# Patient Record
Sex: FEMALE | Race: BLACK OR AFRICAN AMERICAN | NOT HISPANIC OR LATINO | Employment: OTHER | URBAN - NONMETROPOLITAN AREA
[De-identification: names, ages, dates, MRNs, and addresses within clinical notes are randomized per-mention and may not be internally consistent; named-entity substitution may affect disease eponyms.]

---

## 2018-01-08 ENCOUNTER — TRANSCRIBE ORDERS (OUTPATIENT)
Dept: ULTRASOUND IMAGING | Facility: HOSPITAL | Age: 68
End: 2018-01-08

## 2018-01-08 DIAGNOSIS — E04.1 THYROID NODULE: ICD-10-CM

## 2018-01-08 DIAGNOSIS — R79.89 ABNORMAL THYROID BLOOD TEST: Primary | ICD-10-CM

## 2018-01-11 ENCOUNTER — HOSPITAL ENCOUNTER (OUTPATIENT)
Dept: ULTRASOUND IMAGING | Facility: HOSPITAL | Age: 68
Discharge: HOME | End: 2018-01-11
Admitting: NURSE PRACTITIONER

## 2018-01-11 DIAGNOSIS — E04.1 THYROID NODULE: ICD-10-CM

## 2018-01-11 DIAGNOSIS — R79.89 ABNORMAL THYROID BLOOD TEST: ICD-10-CM

## 2018-01-11 PROCEDURE — 76536 US EXAM OF HEAD AND NECK: CPT

## 2018-01-23 ENCOUNTER — TRANSCRIBE ORDERS (OUTPATIENT)
Dept: ENDOCRINOLOGY | Facility: CLINIC | Age: 68
End: 2018-01-23

## 2018-01-23 DIAGNOSIS — E05.90 HYPERTHYROIDISM: Primary | ICD-10-CM

## 2018-04-18 ENCOUNTER — OFFICE VISIT (OUTPATIENT)
Dept: INTERNAL MEDICINE | Facility: CLINIC | Age: 68
End: 2018-04-18

## 2018-04-18 VITALS
OXYGEN SATURATION: 97 % | HEART RATE: 58 BPM | TEMPERATURE: 97 F | HEIGHT: 62 IN | WEIGHT: 127 LBS | DIASTOLIC BLOOD PRESSURE: 61 MMHG | BODY MASS INDEX: 23.37 KG/M2 | SYSTOLIC BLOOD PRESSURE: 124 MMHG

## 2018-04-18 DIAGNOSIS — M25.562 CHRONIC PAIN OF LEFT KNEE: ICD-10-CM

## 2018-04-18 DIAGNOSIS — G89.29 CHRONIC NECK PAIN: ICD-10-CM

## 2018-04-18 DIAGNOSIS — M25.662 STIFFNESS OF LEFT KNEE: ICD-10-CM

## 2018-04-18 DIAGNOSIS — J30.2 CHRONIC SEASONAL ALLERGIC RHINITIS DUE TO OTHER ALLERGEN: ICD-10-CM

## 2018-04-18 DIAGNOSIS — G89.29 CHRONIC PAIN OF LEFT KNEE: ICD-10-CM

## 2018-04-18 DIAGNOSIS — Z12.31 SCREENING MAMMOGRAM, ENCOUNTER FOR: ICD-10-CM

## 2018-04-18 DIAGNOSIS — R79.89 LOW VITAMIN D LEVEL: ICD-10-CM

## 2018-04-18 DIAGNOSIS — M54.2 CHRONIC NECK PAIN: ICD-10-CM

## 2018-04-18 DIAGNOSIS — I10 ESSENTIAL HYPERTENSION: Primary | ICD-10-CM

## 2018-04-18 PROCEDURE — 99204 OFFICE O/P NEW MOD 45 MIN: CPT | Performed by: INTERNAL MEDICINE

## 2018-04-18 RX ORDER — HYDROCODONE BITARTRATE AND ACETAMINOPHEN 10; 325 MG/1; MG/1
1 TABLET ORAL EVERY 6 HOURS PRN
COMMUNITY
End: 2018-08-14

## 2018-04-18 RX ORDER — LISINOPRIL 20 MG/1
20 TABLET ORAL DAILY
Qty: 30 TABLET | Refills: 5 | Status: SHIPPED | OUTPATIENT
Start: 2018-04-18 | End: 2018-10-09 | Stop reason: SDUPTHER

## 2018-04-18 RX ORDER — ASPIRIN 81 MG/1
81 TABLET ORAL DAILY
COMMUNITY
End: 2018-04-18 | Stop reason: SDUPTHER

## 2018-04-18 RX ORDER — CETIRIZINE HYDROCHLORIDE 10 MG/1
10 TABLET ORAL DAILY
COMMUNITY
End: 2018-04-18 | Stop reason: SDUPTHER

## 2018-04-18 RX ORDER — LISINOPRIL 20 MG/1
20 TABLET ORAL DAILY
COMMUNITY
End: 2018-04-18 | Stop reason: SDUPTHER

## 2018-04-18 RX ORDER — CETIRIZINE HYDROCHLORIDE 10 MG/1
10 TABLET ORAL DAILY
Qty: 30 TABLET | Refills: 5 | Status: SHIPPED | OUTPATIENT
Start: 2018-04-18 | End: 2018-05-30

## 2018-04-18 RX ORDER — ASPIRIN 81 MG/1
81 TABLET ORAL DAILY
Qty: 30 TABLET | Refills: 5 | Status: SHIPPED | OUTPATIENT
Start: 2018-04-18

## 2018-04-18 NOTE — PATIENT INSTRUCTIONS
Health Maintenance, Female  Adopting a healthy lifestyle and getting preventive care can go a long way to promote health and wellness. Talk with your health care provider about what schedule of regular examinations is right for you. This is a good chance for you to check in with your provider about disease prevention and staying healthy.  In between checkups, there are plenty of things you can do on your own. Experts have done a lot of research about which lifestyle changes and preventive measures are most likely to keep you healthy. Ask your health care provider for more information.  Weight and diet  Eat a healthy diet  · Be sure to include plenty of vegetables, fruits, low-fat dairy products, and lean protein.  · Do not eat a lot of foods high in solid fats, added sugars, or salt.  · Get regular exercise. This is one of the most important things you can do for your health.  ¨ Most adults should exercise for at least 150 minutes each week. The exercise should increase your heart rate and make you sweat (moderate-intensity exercise).  ¨ Most adults should also do strengthening exercises at least twice a week. This is in addition to the moderate-intensity exercise.  Maintain a healthy weight  · Body mass index (BMI) is a measurement that can be used to identify possible weight problems. It estimates body fat based on height and weight. Your health care provider can help determine your BMI and help you achieve or maintain a healthy weight.  · For females 20 years of age and older:  ¨ A BMI below 18.5 is considered underweight.  ¨ A BMI of 18.5 to 24.9 is normal.  ¨ A BMI of 25 to 29.9 is considered overweight.  ¨ A BMI of 30 and above is considered obese.  Watch levels of cholesterol and blood lipids  · You should start having your blood tested for lipids and cholesterol at 20 years of age, then have this test every 5 years.  · You may need to have your cholesterol levels checked more often if:  ¨ Your lipid or  cholesterol levels are high.  ¨ You are older than 50 years of age.  ¨ You are at high risk for heart disease.  Cancer screening  Lung Cancer  · Lung cancer screening is recommended for adults 55-80 years old who are at high risk for lung cancer because of a history of smoking.  · A yearly low-dose CT scan of the lungs is recommended for people who:  ¨ Currently smoke.  ¨ Have quit within the past 15 years.  ¨ Have at least a 30-pack-year history of smoking. A pack year is smoking an average of one pack of cigarettes a day for 1 year.  · Yearly screening should continue until it has been 15 years since you quit.  · Yearly screening should stop if you develop a health problem that would prevent you from having lung cancer treatment.  Breast Cancer  · Practice breast self-awareness. This means understanding how your breasts normally appear and feel.  · It also means doing regular breast self-exams. Let your health care provider know about any changes, no matter how small.  · If you are in your 20s or 30s, you should have a clinical breast exam (CBE) by a health care provider every 1-3 years as part of a regular health exam.  · If you are 40 or older, have a CBE every year. Also consider having a breast X-ray (mammogram) every year.  · If you have a family history of breast cancer, talk to your health care provider about genetic screening.  · If you are at high risk for breast cancer, talk to your health care provider about having an MRI and a mammogram every year.  · Breast cancer gene (BRCA) assessment is recommended for women who have family members with BRCA-related cancers. BRCA-related cancers include:  ¨ Breast.  ¨ Ovarian.  ¨ Tubal.  ¨ Peritoneal cancers.  · Results of the assessment will determine the need for genetic counseling and BRCA1 and BRCA2 testing.  Cervical Cancer   Your health care provider may recommend that you be screened regularly for cancer of the pelvic organs (ovaries, uterus, and vagina).  This screening involves a pelvic examination, including checking for microscopic changes to the surface of your cervix (Pap test). You may be encouraged to have this screening done every 3 years, beginning at age 21.  · For women ages 30-65, health care providers may recommend pelvic exams and Pap testing every 3 years, or they may recommend the Pap and pelvic exam, combined with testing for human papilloma virus (HPV), every 5 years. Some types of HPV increase your risk of cervical cancer. Testing for HPV may also be done on women of any age with unclear Pap test results.  · Other health care providers may not recommend any screening for nonpregnant women who are considered low risk for pelvic cancer and who do not have symptoms. Ask your health care provider if a screening pelvic exam is right for you.  · If you have had past treatment for cervical cancer or a condition that could lead to cancer, you need Pap tests and screening for cancer for at least 20 years after your treatment. If Pap tests have been discontinued, your risk factors (such as having a new sexual partner) need to be reassessed to determine if screening should resume. Some women have medical problems that increase the chance of getting cervical cancer. In these cases, your health care provider may recommend more frequent screening and Pap tests.  Colorectal Cancer  · This type of cancer can be detected and often prevented.  · Routine colorectal cancer screening usually begins at 50 years of age and continues through 75 years of age.  · Your health care provider may recommend screening at an earlier age if you have risk factors for colon cancer.  · Your health care provider may also recommend using home test kits to check for hidden blood in the stool.  · A small camera at the end of a tube can be used to examine your colon directly (sigmoidoscopy or colonoscopy). This is done to check for the earliest forms of colorectal cancer.  · Routine  screening usually begins at age 50.  · Direct examination of the colon should be repeated every 5-10 years through 75 years of age. However, you may need to be screened more often if early forms of precancerous polyps or small growths are found.  Skin Cancer  · Check your skin from head to toe regularly.  · Tell your health care provider about any new moles or changes in moles, especially if there is a change in a mole's shape or color.  · Also tell your health care provider if you have a mole that is larger than the size of a pencil eraser.  · Always use sunscreen. Apply sunscreen liberally and repeatedly throughout the day.  · Protect yourself by wearing long sleeves, pants, a wide-brimmed hat, and sunglasses whenever you are outside.  Heart disease, diabetes, and high blood pressure  · High blood pressure causes heart disease and increases the risk of stroke. High blood pressure is more likely to develop in:  ¨ People who have blood pressure in the high end of the normal range (130-139/85-89 mm Hg).  ¨ People who are overweight or obese.  ¨ People who are .  · If you are 18-39 years of age, have your blood pressure checked every 3-5 years. If you are 40 years of age or older, have your blood pressure checked every year. You should have your blood pressure measured twice--once when you are at a hospital or clinic, and once when you are not at a hospital or clinic. Record the average of the two measurements. To check your blood pressure when you are not at a hospital or clinic, you can use:  ¨ An automated blood pressure machine at a pharmacy.  ¨ A home blood pressure monitor.  · If you are between 55 years and 79 years old, ask your health care provider if you should take aspirin to prevent strokes.  · Have regular diabetes screenings. This involves taking a blood sample to check your fasting blood sugar level.  ¨ If you are at a normal weight and have a low risk for diabetes, have this test once  every three years after 45 years of age.  ¨ If you are overweight and have a high risk for diabetes, consider being tested at a younger age or more often.  Preventing infection  Hepatitis B  · If you have a higher risk for hepatitis B, you should be screened for this virus. You are considered at high risk for hepatitis B if:  ¨ You were born in a country where hepatitis B is common. Ask your health care provider which countries are considered high risk.  ¨ Your parents were born in a high-risk country, and you have not been immunized against hepatitis B (hepatitis B vaccine).  ¨ You have HIV or AIDS.  ¨ You use needles to inject street drugs.  ¨ You live with someone who has hepatitis B.  ¨ You have had sex with someone who has hepatitis B.  ¨ You get hemodialysis treatment.  ¨ You take certain medicines for conditions, including cancer, organ transplantation, and autoimmune conditions.  Hepatitis C  · Blood testing is recommended for:  ¨ Everyone born from 1945 through 1965.  ¨ Anyone with known risk factors for hepatitis C.  Sexually transmitted infections (STIs)  · You should be screened for sexually transmitted infections (STIs) including gonorrhea and chlamydia if:  ¨ You are sexually active and are younger than 24 years of age.  ¨ You are older than 24 years of age and your health care provider tells you that you are at risk for this type of infection.  ¨ Your sexual activity has changed since you were last screened and you are at an increased risk for chlamydia or gonorrhea. Ask your health care provider if you are at risk.  · If you do not have HIV, but are at risk, it may be recommended that you take a prescription medicine daily to prevent HIV infection. This is called pre-exposure prophylaxis (PrEP). You are considered at risk if:  ¨ You are sexually active and do not regularly use condoms or know the HIV status of your partner(s).  ¨ You take drugs by injection.  ¨ You are sexually active with a partner  who has HIV.  Talk with your health care provider about whether you are at high risk of being infected with HIV. If you choose to begin PrEP, you should first be tested for HIV. You should then be tested every 3 months for as long as you are taking PrEP.  Pregnancy  · If you are premenopausal and you may become pregnant, ask your health care provider about preconception counseling.  · If you may become pregnant, take 400 to 800 micrograms (mcg) of folic acid every day.  · If you want to prevent pregnancy, talk to your health care provider about birth control (contraception).  Osteoporosis and menopause  · Osteoporosis is a disease in which the bones lose minerals and strength with aging. This can result in serious bone fractures. Your risk for osteoporosis can be identified using a bone density scan.  · If you are 65 years of age or older, or if you are at risk for osteoporosis and fractures, ask your health care provider if you should be screened.  · Ask your health care provider whether you should take a calcium or vitamin D supplement to lower your risk for osteoporosis.  · Menopause may have certain physical symptoms and risks.  · Hormone replacement therapy may reduce some of these symptoms and risks.  Talk to your health care provider about whether hormone replacement therapy is right for you.  Follow these instructions at home:  · Schedule regular health, dental, and eye exams.  · Stay current with your immunizations.  · Do not use any tobacco products including cigarettes, chewing tobacco, or electronic cigarettes.  · If you are pregnant, do not drink alcohol.  · If you are breastfeeding, limit how much and how often you drink alcohol.  · Limit alcohol intake to no more than 1 drink per day for nonpregnant women. One drink equals 12 ounces of beer, 5 ounces of wine, or 1½ ounces of hard liquor.  · Do not use street drugs.  · Do not share needles.  · Ask your health care provider for help if you need support  or information about quitting drugs.  · Tell your health care provider if you often feel depressed.  · Tell your health care provider if you have ever been abused or do not feel safe at home.  This information is not intended to replace advice given to you by your health care provider. Make sure you discuss any questions you have with your health care provider.  Document Released: 07/02/2012 Document Revised: 05/25/2017 Document Reviewed: 09/20/2016  [employer] Patient Education © 2017 [employer]

## 2018-04-18 NOTE — PROGRESS NOTES
Chief Complaint   Patient presents with   • [facility]     former patient of [facility]; patient had to change providers due to insurance change; patient needs referral to Dr Nation; patient has an appointment tomorrow       Subjective     History of Present Illness   Mya Hawkins is a 67 y.o. female presenting to [facility].  She requests a referral to Dr. Nation for chronic pain management.  Patient has chronic pain in her LLE.  She has had two TKA procedures on that knee and despite this she is unable to bend her knee due to scarring.  She is currently on disability for her knee.  She also had cervical spine fracture which has resulted in chronic pain which intermittently bothers her. Tens unit helps for her pains.     BP well controlled on lisinopril.      She does have allergies which are controlled with Zyrtec.     Preventive health was reviewed and updated.     The following portions of the patient's history were reviewed and updated as appropriate: allergies, current medications, past family history, past medical history, past social history, past surgical history and problem list.    Review of Systems   Constitutional: Negative for chills, fatigue and fever.   HENT: Negative for congestion, ear pain, rhinorrhea, sinus pressure and sore throat.    Eyes: Negative for visual disturbance.   Respiratory: Negative for cough, chest tightness, shortness of breath and wheezing.    Cardiovascular: Negative for chest pain, palpitations and leg swelling.   Gastrointestinal: Negative for abdominal pain, blood in stool, constipation, diarrhea, nausea and vomiting.   Endocrine: Negative for polydipsia and polyuria.   Genitourinary: Negative for dysuria and hematuria.   Musculoskeletal: Positive for arthralgias, back pain and neck pain.   Skin: Negative for rash.   Neurological: Negative for dizziness, light-headedness, numbness and headaches.   Psychiatric/Behavioral: Negative for dysphoric mood  "and sleep disturbance. The patient is not nervous/anxious.        No Known Allergies    Past Medical History:   Diagnosis Date   • Hay fever    • Hypertension        Social History     Social History   • Marital status:      Spouse name: N/A   • Number of children: N/A   • Years of education: N/A     Occupational History   • Not on file.     Social History Main Topics   • Smoking status: Current Every Day Smoker   • Smokeless tobacco: Not on file   • Alcohol use No   • Drug use: No   • Sexual activity: Not on file     Other Topics Concern   • Not on file     Social History Narrative   • No narrative on file        Past Surgical History:   Procedure Laterality Date   • CATARACT EXTRACTION     • HYSTERECTOMY     • KNEE SURGERY     • TONSILLECTOMY         Family History   Problem Relation Age of Onset   • Diabetes Mother    • Heart attack Mother    • Diabetes Father    • Heart attack Father    • Stroke Father    • Hyperlipidemia Father    • Hypertension Father    • Colon polyps Brother    • Cancer Paternal Aunt      stomach         Current Outpatient Prescriptions:   •  aspirin 81 MG EC tablet, Take 1 tablet by mouth Daily., Disp: 30 tablet, Rfl: 5  •  cetirizine (zyrTEC) 10 MG tablet, Take 1 tablet by mouth Daily., Disp: 30 tablet, Rfl: 5  •  HYDROcodone-acetaminophen (NORCO)  MG per tablet, Take 1 tablet by mouth Every 6 (Six) Hours As Needed for Moderate Pain ., Disp: , Rfl:   •  lisinopril (PRINIVIL,ZESTRIL) 20 MG tablet, Take 1 tablet by mouth Daily., Disp: 30 tablet, Rfl: 5    Objective   /61 (BP Location: Left arm, Patient Position: Sitting)   Pulse 58   Temp 97 °F (36.1 °C)   Ht 157.5 cm (62\")   Wt 57.6 kg (127 lb)   SpO2 97%   BMI 23.23 kg/m²     Physical Exam   Constitutional: She is oriented to person, place, and time. She appears well-nourished. No distress.   HENT:   Head: Atraumatic.   Right Ear: External ear normal.   Left Ear: External ear normal.   Eyes: Conjunctivae are " normal. Right eye exhibits no discharge. Left eye exhibits no discharge.   Neck: Normal range of motion. Neck supple.   Cardiovascular: Normal rate and regular rhythm.    No murmur heard.  Pulmonary/Chest: Effort normal and breath sounds normal. She has no wheezes. She has no rales.   Abdominal: Soft. Bowel sounds are normal. She exhibits no distension. There is no tenderness.   Neurological: She is alert and oriented to person, place, and time.   Skin: No rash noted. She is not diaphoretic.   Psychiatric: She has a normal mood and affect.   Nursing note and vitals reviewed.      Assessment/Plan   Mya was seen today for [facility].    Diagnoses and all orders for this visit:    Essential hypertension  -     lisinopril (PRINIVIL,ZESTRIL) 20 MG tablet; Take 1 tablet by mouth Daily.  -     aspirin 81 MG EC tablet; Take 1 tablet by mouth Daily.    Chronic pain of left knee  -     Ambulatory Referral to Pain Management    Stiffness of left knee  -     Ambulatory Referral to Pain Management    Screening mammogram, encounter for  -     Mammo screening digital tomosynthesis bilateral w CAD    Chronic neck pain  -     Ambulatory Referral to Pain Management    Low vitamin D level    Chronic seasonal allergic rhinitis due to other allergen  -     cetirizine (zyrTEC) 10 MG tablet; Take 1 tablet by mouth Daily.          Discussion Summary:    67 AA F presenting to [facility] care    1. 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 Pt to transfer from Dr. Zhou to Dr. Nation due to insurance requirements.    2. Preventive health / vaccines  -Records needed from [facility] for vaccines  - Due for Mammogram, ordered.      Follow up:  Return in about 6 weeks (around 5/30/2018) for Medicare Wellness.     Patient Instructions:  Patient instructions were provided.

## 2018-04-25 ENCOUNTER — HOSPITAL ENCOUNTER (OUTPATIENT)
Dept: MAMMOGRAPHY | Facility: HOSPITAL | Age: 68
Discharge: HOME | End: 2018-04-25
Attending: INTERNAL MEDICINE | Admitting: INTERNAL MEDICINE

## 2018-04-25 PROCEDURE — 77067 SCR MAMMO BI INCL CAD: CPT

## 2018-04-25 PROCEDURE — 77063 BREAST TOMOSYNTHESIS BI: CPT

## 2018-05-30 ENCOUNTER — OFFICE VISIT (OUTPATIENT)
Dept: INTERNAL MEDICINE | Facility: CLINIC | Age: 68
End: 2018-05-30

## 2018-05-30 VITALS
OXYGEN SATURATION: 98 % | WEIGHT: 125 LBS | BODY MASS INDEX: 23 KG/M2 | TEMPERATURE: 97.6 F | HEART RATE: 69 BPM | HEIGHT: 62 IN | DIASTOLIC BLOOD PRESSURE: 66 MMHG | SYSTOLIC BLOOD PRESSURE: 135 MMHG

## 2018-05-30 DIAGNOSIS — Z12.11 ENCOUNTER FOR SCREENING COLONOSCOPY: Primary | ICD-10-CM

## 2018-05-30 DIAGNOSIS — B35.6 TINEA CRURIS: ICD-10-CM

## 2018-05-30 PROCEDURE — 99213 OFFICE O/P EST LOW 20 MIN: CPT | Performed by: INTERNAL MEDICINE

## 2018-05-30 NOTE — PROGRESS NOTES
Chief Complaint   Patient presents with   • Hypertension     follow up; also c/o rash on back of neck x 1 week       Subjective     History of Present Illness   Mya Hawkins is a 68 y.o. female presenting for follow-up on chronic medical issues.  Patient shows that her chronic pain Main stable.  Blood pressure is well controlled with her current doses of lisinopril.  She does mention that she has a rash on her left upper neck which has been pruritic and bothering her over the last 1 week.  Health maintenance topics were also briefly reviewed today.    Vaccination record is at [facility].  [place] primary care curds are pending.    The following portions of the patient's history were reviewed and updated as appropriate: allergies, current medications, past family history, past medical history, past social history, past surgical history and problem list.    Review of Systems   Constitutional: Negative for chills, fatigue and fever.   HENT: Negative for congestion, ear pain, rhinorrhea, sinus pressure and sore throat.    Eyes: Negative for visual disturbance.   Respiratory: Negative for cough, chest tightness, shortness of breath and wheezing.    Cardiovascular: Negative for chest pain, palpitations and leg swelling.   Gastrointestinal: Negative for abdominal pain, blood in stool, constipation, diarrhea, nausea and vomiting.   Endocrine: Negative for polydipsia and polyuria.   Genitourinary: Negative for dysuria and hematuria.   Musculoskeletal: Negative for back pain.   Skin: Negative for rash.   Neurological: Negative for dizziness, light-headedness, numbness and headaches.   Psychiatric/Behavioral: Negative for dysphoric mood and sleep disturbance. The patient is not nervous/anxious.        No Known Allergies    Past Medical History:   Diagnosis Date   • Hay fever    • Hypertension        Social History     Social History   • Marital status:      Spouse name: N/A   • Number of children: N/A  "  • Years of education: N/A     Occupational History   • Not on file.     Social History Main Topics   • Smoking status: Current Every Day Smoker   • Smokeless tobacco: Not on file   • Alcohol use No   • Drug use: No   • Sexual activity: Not on file     Other Topics Concern   • Not on file     Social History Narrative   • No narrative on file        Past Surgical History:   Procedure Laterality Date   • CATARACT EXTRACTION     • HYSTERECTOMY     • KNEE SURGERY     • TONSILLECTOMY         Family History   Problem Relation Age of Onset   • Diabetes Mother    • Heart attack Mother    • Diabetes Father    • Heart attack Father    • Stroke Father    • Hyperlipidemia Father    • Hypertension Father    • Colon polyps Brother    • Cancer Paternal Aunt         stomach         Current Outpatient Prescriptions:   •  aspirin 81 MG EC tablet, Take 1 tablet by mouth Daily., Disp: 30 tablet, Rfl: 5  •  HYDROcodone-acetaminophen (NORCO)  MG per tablet, Take 1 tablet by mouth Every 6 (Six) Hours As Needed for Moderate Pain ., Disp: , Rfl:   •  lisinopril (PRINIVIL,ZESTRIL) 20 MG tablet, Take 1 tablet by mouth Daily., Disp: 30 tablet, Rfl: 5  •  nystatin-triamcinolone (MYCOLOG II) 518551-5.1 UNIT/GM-% cream, Apply  topically 2 (Two) Times a Day., Disp: 30 g, Rfl: 1    Objective   /66 (BP Location: Left arm, Patient Position: Sitting)   Pulse 69   Temp 97.6 °F (36.4 °C)   Ht 157.5 cm (62\")   Wt 56.7 kg (125 lb)   SpO2 98%   BMI 22.86 kg/m²     Physical Exam   Constitutional: She is oriented to person, place, and time. She appears well-developed and well-nourished.   HENT:   Head: Normocephalic and atraumatic.   Eyes: Conjunctivae are normal.   Pulmonary/Chest: Effort normal.   Musculoskeletal: Normal range of motion.   Neurological: She is alert and oriented to person, place, and time.   Psychiatric: She has a normal mood and affect. Her behavior is normal.   Nursing note and vitals reviewed.      Assessment/Plan "   Mya was seen today for hypertension.    Diagnoses and all orders for this visit:    Encounter for screening colonoscopy  -     Ambulatory Referral For Screening Colonoscopy    Tinea cruris  -     nystatin-triamcinolone (MYCOLOG II) 394395-8.1 UNIT/GM-% cream; Apply  topically 2 (Two) Times a Day.      Discussion Summary:    68-year-old -American female presenting for follow-up on chronic medical issues.    1.  Tinea cruris  -Start nystatin/triamcinolone twice a day for 2 weeks.    2.  Needs screening colonoscopy  -Order placed.    3.  Vaccination record to be obtained from health Department.  Documentation for several vaccines is necessary.    4.  Patient has concerns for hepatitis.  Records from primary care clinic need to be obtained and reviewed prior to blood work being ordered.    Follow up:  Return for Medicare Wellness.     Patient Instructions:  Patient instructions were provided.

## 2018-07-12 ENCOUNTER — OFFICE VISIT (OUTPATIENT)
Dept: INTERNAL MEDICINE | Facility: CLINIC | Age: 68
End: 2018-07-12

## 2018-07-12 VITALS
BODY MASS INDEX: 22.08 KG/M2 | OXYGEN SATURATION: 99 % | WEIGHT: 120 LBS | HEART RATE: 55 BPM | TEMPERATURE: 97.5 F | SYSTOLIC BLOOD PRESSURE: 116 MMHG | HEIGHT: 62 IN | DIASTOLIC BLOOD PRESSURE: 55 MMHG

## 2018-07-12 DIAGNOSIS — J30.2 CHRONIC SEASONAL ALLERGIC RHINITIS DUE TO OTHER ALLERGEN: ICD-10-CM

## 2018-07-12 DIAGNOSIS — G89.29 CHRONIC NECK PAIN: ICD-10-CM

## 2018-07-12 DIAGNOSIS — G89.29 CHRONIC PAIN OF RIGHT KNEE: ICD-10-CM

## 2018-07-12 DIAGNOSIS — M54.2 CHRONIC NECK PAIN: ICD-10-CM

## 2018-07-12 DIAGNOSIS — I10 ESSENTIAL HYPERTENSION: Primary | ICD-10-CM

## 2018-07-12 DIAGNOSIS — R53.82 CHRONIC FATIGUE: ICD-10-CM

## 2018-07-12 DIAGNOSIS — M25.561 CHRONIC PAIN OF RIGHT KNEE: ICD-10-CM

## 2018-07-12 DIAGNOSIS — Z20.5 EXPOSURE TO HEPATITIS: ICD-10-CM

## 2018-07-12 DIAGNOSIS — E78.49 OTHER HYPERLIPIDEMIA: ICD-10-CM

## 2018-07-12 PROBLEM — J30.9 ALLERGIC RHINITIS DUE TO ALLERGEN: Status: ACTIVE | Noted: 2018-07-12

## 2018-07-12 PROCEDURE — G0439 PPPS, SUBSEQ VISIT: HCPCS | Performed by: INTERNAL MEDICINE

## 2018-07-12 NOTE — PATIENT INSTRUCTIONS
Medicare Wellness  Personal Prevention Plan of Service     Date of Office Visit:  2018  Encounter Provider:  Garland Rodriguez DO  Place of Service:  [facility]  Patient Name: Mya Hawkins  :  1950    As part of the Medicare Wellness portion of your visit today, we are providing you with this personalized preventive plan of services (PPPS). This plan is based upon recommendations of the [place] Preventive Services Task Force (USPSTF) and the Advisory Committee on Immunization Practices (ACIP).    This lists the preventive care services that should be considered, and provides dates of when you are due. Items listed as completed are up-to-date and do not require any further intervention.    Health Maintenance   Topic Date Due   • TDAP/TD VACCINES (1 - Tdap) 1969   • ZOSTER VACCINE (1 of 2) 2000   • PNEUMOCOCCAL VACCINES (65+ LOW/MEDIUM RISK) (1 of 2 - PCV13) 2015   • MEDICARE ANNUAL WELLNESS  2018   • COLONOSCOPY  2018   • INFLUENZA VACCINE  2018   • MAMMOGRAM  2020   • HEPATITIS C SCREENING  Completed       No orders of the defined types were placed in this encounter.      No Follow-up on file.

## 2018-07-12 NOTE — PROGRESS NOTES
4poQUICK REFERENCE INFORMATION:  The ABCs of the Annual Wellness Visit    Subsequent Medicare Wellness Visit    HEALTH RISK ASSESSMENT    1950     Recent Hospitalizations:   No hospitalization(s) within the last year..        Current Medical Providers:  Patient Care Team:  Garland Rodriguez DO as PCP - General (Internal Medicine)        Smoking Status:  History   Smoking Status   • Current Every Day Smoker   Smokeless Tobacco   • Not on file       Alcohol Consumption:  History   Alcohol Use No       Depression Screen:   PHQ-2/PHQ-9 Depression Screening 7/12/2018   Little interest or pleasure in doing things 0   Feeling down, depressed, or hopeless 0   Total Score 0       Health Habits and Functional and Cognitive Screening:  Functional & Cognitive Status 7/12/2018   Do you have difficulty preparing food and eating? No   Do you have difficulty bathing yourself, getting dressed or grooming yourself? No   Do you have difficulty using the toilet? No   Do you have difficulty moving around from place to place? No   Do you have trouble with steps or getting out of a bed or a chair? No   In the past year have you fallen or experienced a near fall? No   Current Diet Well Balanced Diet   Dental Exam Up to date   Eye Exam Up to date   Exercise (times per week) 7 times per week   Current Exercise Activities Include Housecleaning   Do you need help using the phone?  No   Are you deaf or do you have serious difficulty hearing?  No   Do you need help with transportation? No   Do you need help shopping? No   Do you need help preparing meals?  No   Do you need help with housework?  No   Do you need help with laundry? No   Do you need help taking your medications? No   Do you need help managing money? No   Do you ever drive or ride in a car without wearing a seat belt? No   Have you felt unusual stress, anger or loneliness in the last month? No   Who do you live with? Other   Do you have difficulty concentrating, remembering or  making decisions? No           Does the patient have evidence of cognitive impairment? No    Aspirin use counseling: Taking ASA appropriately as indicated      Recent Lab Results:  CMP:  Lab Results   Component Value Date    GLU 88 07/13/2018    BUN 11 07/13/2018    CREATININE 0.80 07/13/2018    EGFRIFNONA 71 07/13/2018    EGFRIFAFRI 86 07/13/2018    BCR 13.8 07/13/2018     07/13/2018    K 4.2 07/13/2018    CO2 27.0 07/13/2018    CALCIUM 9.8 07/13/2018    PROTENTOTREF 8.1 07/13/2018    ALBUMIN 4.10 07/13/2018    LABGLOBREF 4.0 07/13/2018    LABIL2 1.0 07/13/2018    BILITOT 0.8 07/13/2018    ALKPHOS 75 07/13/2018    AST 41 07/13/2018    ALT 33 07/13/2018     Lipid Panel:  Lab Results   Component Value Date    TRIG 67 07/13/2018    HDL 50 07/13/2018    VLDL 13.4 07/13/2018     HbA1c:       Visual Acuity:  No exam data present    Age-appropriate Screening Schedule:  Refer to the list below for future screening recommendations based on patient's age, sex and/or medical conditions. Orders for these recommended tests are listed in the plan section. The patient has been provided with a written plan.    Health Maintenance   Topic Date Due   • TDAP/TD VACCINES (1 - Tdap) 05/24/1969   • ZOSTER VACCINE (1 of 2) 05/24/2000   • PNEUMOCOCCAL VACCINES (65+ LOW/MEDIUM RISK) (1 of 2 - PCV13) 05/24/2015   • COLONOSCOPY  01/23/2018   • INFLUENZA VACCINE  08/01/2018   • LIPID PANEL  07/13/2019   • MAMMOGRAM  04/25/2020        Subjective   History of Present Illness    Mya Hawkins is a 68 y.o. female who presents for an Subsequent Wellness Visit. BP remains well controlled, She requests to check for Hep C as she notes she may have tested positive in the past. Chronic left knee pain and stiffness remains well controlled with current medications.     The following portions of the patient's history were reviewed and updated as appropriate: allergies, current medications, past family history, past medical history, past  social history, past surgical history and problem list.    Outpatient Medications Prior to Visit   Medication Sig Dispense Refill   • aspirin 81 MG EC tablet Take 1 tablet by mouth Daily. 30 tablet 5   • HYDROcodone-acetaminophen (NORCO)  MG per tablet Take 1 tablet by mouth Every 6 (Six) Hours As Needed for Moderate Pain .     • lisinopril (PRINIVIL,ZESTRIL) 20 MG tablet Take 1 tablet by mouth Daily. 30 tablet 5   • nystatin-triamcinolone (MYCOLOG II) 463087-2.1 UNIT/GM-% cream Apply  topically 2 (Two) Times a Day. 30 g 1     No facility-administered medications prior to visit.        Patient Active Problem List   Diagnosis   • Essential hypertension   • Chronic pain of right knee   • Chronic neck pain   • Allergic rhinitis due to allergen       Advance Care Planning:  has an advance directive - a copy HAS NOT been provided. Have asked the patient to send this to us to add to record.    Identification of Risk Factors:  Risk factors include: weight  and cardiovascular risk.    Review of Systems   Constitutional: Negative for chills, fatigue and fever.   HENT: Negative for congestion, ear pain, rhinorrhea, sinus pressure and sore throat.    Eyes: Negative for visual disturbance.   Respiratory: Negative for cough, chest tightness, shortness of breath and wheezing.    Cardiovascular: Negative for chest pain, palpitations and leg swelling.   Gastrointestinal: Negative for abdominal pain, blood in stool, constipation, diarrhea, nausea and vomiting.   Endocrine: Negative for polydipsia and polyuria.   Genitourinary: Negative for dysuria and hematuria.   Musculoskeletal: Positive for arthralgias. Negative for back pain.   Skin: Negative for rash.   Neurological: Negative for dizziness, light-headedness, numbness and headaches.   Psychiatric/Behavioral: Negative for dysphoric mood and sleep disturbance. The patient is not nervous/anxious.        Compared to one year ago, the patient feels her physical health is the  "same.  Compared to one year ago, the patient feels her mental health is the same.    Objective     Physical Exam   Constitutional: She is oriented to person, place, and time. She appears well-developed and well-nourished.   HENT:   Head: Normocephalic and atraumatic.   Eyes: Conjunctivae are normal.   Cardiovascular: Normal rate, regular rhythm and normal heart sounds.    Pulmonary/Chest: Effort normal.   Abdominal: Soft. Bowel sounds are normal. She exhibits no distension. There is no tenderness.   Musculoskeletal: Normal range of motion.   Neurological: She is alert and oriented to person, place, and time.   Psychiatric: She has a normal mood and affect. Her behavior is normal.   Nursing note and vitals reviewed.      Vitals:    07/12/18 1422   BP: 116/55   BP Location: Left arm   Patient Position: Sitting   Pulse: 55   Temp: 97.5 °F (36.4 °C)   SpO2: 99%   Weight: 54.4 kg (120 lb)   Height: 157.5 cm (62\")   PainSc:   8       Patient's Body mass index is 21.95 kg/m². BMI is within normal parameters. No follow-up required.      Assessment/Plan   Patient Self-Management and Personalized Health Advice  The patient has been provided with information about: exercise and designing advance directives and preventive services including:   · Exercise counseling provided, TdaP vaccine.    Visit Diagnoses:    ICD-10-CM ICD-9-CM   1. Essential hypertension I10 401.9   2. Chronic pain of right knee M25.561 719.46    G89.29 338.29   3. Chronic neck pain M54.2 723.1    G89.29 338.29   4. Chronic seasonal allergic rhinitis due to other allergen J30.2 477.8   5. Exposure to hepatitis Z20.5 V01.79   6. Other hyperlipidemia E78.4 272.4   7. Chronic fatigue  R53.82 780.79       67 yo AA F presenting for Wellness exam     1. HTN - stable.   2. Chronic Right knee pain - controlled per pain management  3. HLD - stable.   4. Positive Hepatitis - recheck panel today, further work up based on results.     Orders Placed This Encounter "   Procedures   • Basic Metabolic Panel   • Comprehensive Metabolic Panel     Order Specific Question:   [facility] Has the patient fasted?     Answer:   Yes   • Lipid Panel     Order Specific Question:   [facility] Has the patient fasted?     Answer:   Yes   • Hepatitis Panel, Acute     Order Specific Question:   [facility] Has the patient fasted?     Answer:   Yes           Outpatient Encounter Prescriptions as of 7/12/2018   Medication Sig Dispense Refill   • aspirin 81 MG EC tablet Take 1 tablet by mouth Daily. 30 tablet 5   • HYDROcodone-acetaminophen (NORCO)  MG per tablet Take 1 tablet by mouth Every 6 (Six) Hours As Needed for Moderate Pain .     • lisinopril (PRINIVIL,ZESTRIL) 20 MG tablet Take 1 tablet by mouth Daily. 30 tablet 5   • [DISCONTINUED] nystatin-triamcinolone (MYCOLOG II) 886497-9.1 UNIT/GM-% cream Apply  topically 2 (Two) Times a Day. 30 g 1     No facility-administered encounter medications on file as of 7/12/2018.        Reviewed use of high risk medication in the elderly: yes  Reviewed for potential of harmful drug interactions in the elderly: no    Follow Up:  Return in about 6 months (around 1/12/2019) for Next scheduled follow up.     An After Visit Summary and PPPS with all of these plans were given to the patient.

## 2018-07-14 LAB
ALBUMIN SERPL-MCNC: 4.1 G/DL (ref 3.5–5)
ALBUMIN/GLOB SERPL: 1 G/DL (ref 1–2)
ALP SERPL-CCNC: 75 U/L (ref 38–126)
ALT SERPL-CCNC: 33 U/L (ref 13–69)
AST SERPL-CCNC: 41 U/L (ref 15–46)
BILIRUB SERPL-MCNC: 0.8 MG/DL (ref 0.2–1.3)
BUN SERPL-MCNC: 11 MG/DL (ref 7–20)
BUN/CREAT SERPL: 13.8 (ref 7.1–23.5)
CALCIUM SERPL-MCNC: 9.8 MG/DL (ref 8.4–10.2)
CHLORIDE SERPL-SCNC: 105 MMOL/L (ref 98–107)
CHOLEST SERPL-MCNC: 142 MG/DL (ref 0–199)
CO2 SERPL-SCNC: 27 MMOL/L (ref 26–30)
CREAT SERPL-MCNC: 0.8 MG/DL (ref 0.6–1.3)
GLOBULIN SER CALC-MCNC: 4 GM/DL
GLUCOSE SERPL-MCNC: 88 MG/DL (ref 74–98)
HAV IGM SERPL QL IA: NEGATIVE
HBV CORE IGM SERPL QL IA: NEGATIVE
HBV SURFACE AG SERPL QL IA: NEGATIVE
HCV AB S/CO SERPL IA: >11 S/CO RATIO (ref 0–0.9)
HDLC SERPL-MCNC: 50 MG/DL (ref 40–60)
LDLC SERPL CALC-MCNC: 79 MG/DL (ref 0–99)
POTASSIUM SERPL-SCNC: 4.2 MMOL/L (ref 3.5–5.1)
PROT SERPL-MCNC: 8.1 G/DL (ref 6.3–8.2)
SODIUM SERPL-SCNC: 140 MMOL/L (ref 137–145)
TRIGL SERPL-MCNC: 67 MG/DL
VLDLC SERPL CALC-MCNC: 13.4 MG/DL

## 2018-07-17 ENCOUNTER — TELEPHONE (OUTPATIENT)
Dept: INTERNAL MEDICINE | Facility: CLINIC | Age: 68
End: 2018-07-17

## 2018-07-17 DIAGNOSIS — R76.8 HEPATITIS C ANTIBODY TEST POSITIVE: Primary | ICD-10-CM

## 2018-07-17 NOTE — PROGRESS NOTES
I called patient and left voicemail to call back to discuss lab work results.  I have added additional tests for positive Hep C.

## 2018-07-18 DIAGNOSIS — R76.8 HEPATITIS C ANTIBODY TEST POSITIVE: ICD-10-CM

## 2018-07-24 ENCOUNTER — OFFICE VISIT (OUTPATIENT)
Dept: GASTROENTEROLOGY | Facility: CLINIC | Age: 68
End: 2018-07-24

## 2018-07-24 ENCOUNTER — PREP FOR SURGERY (OUTPATIENT)
Dept: OTHER | Facility: HOSPITAL | Age: 68
End: 2018-07-24

## 2018-07-24 VITALS
SYSTOLIC BLOOD PRESSURE: 113 MMHG | RESPIRATION RATE: 14 BRPM | DIASTOLIC BLOOD PRESSURE: 66 MMHG | TEMPERATURE: 97.2 F | HEART RATE: 62 BPM | HEIGHT: 62 IN | WEIGHT: 120 LBS | BODY MASS INDEX: 22.08 KG/M2

## 2018-07-24 DIAGNOSIS — Z12.11 COLON CANCER SCREENING: Primary | ICD-10-CM

## 2018-07-24 DIAGNOSIS — R19.7 DIARRHEA, UNSPECIFIED TYPE: ICD-10-CM

## 2018-07-24 DIAGNOSIS — R76.8 HEPATITIS C ANTIBODY POSITIVE IN BLOOD: ICD-10-CM

## 2018-07-24 DIAGNOSIS — R19.7 DIARRHEA: ICD-10-CM

## 2018-07-24 PROCEDURE — 99204 OFFICE O/P NEW MOD 45 MIN: CPT | Performed by: INTERNAL MEDICINE

## 2018-07-24 NOTE — PROGRESS NOTES
"Chief Complaint   Patient presents with   • Colon Cancer Screening     History of Present Illness     For colon cancer screening. Her last colonoscopy was perhaps 11 years or so ago. The patient denies recent change in bowel habits.  The patient has history of diarrhea off-and-on for the last 2 years.  Severity is mild, frequency of bowel movements being 2-3 times a day. Occasionally,  the patient may have 4 bowel movements a day. The stools are described as loose. watery.  Occasionally, there is no nocturnal element of diarrhea. The diarrhea is associated with tenesmus at times. Patient is not a regular dairy user. There is no history of use of sugar-free candies. She doesn't drink herbal teas. There is no history of use of magnesium products. 3-4 months ago the patient was somewhat constipated and did not have a bowel movement for 2 days. She took a laxative. Otherwise the patient does not take laxatives.  There is no history of abdominal pain.  There is no history of overt GI bleed (hematemesis melena or hematochezia).  The patient denies nausea or vomiting.  There is no history of reflux.  The patient denies dysphagia or odynophagia.  There is no history of recent significant weight loss.      The patient was told to have \"hepatitis C\" several years ago perhaps 11 years ago. The patient had blood transfusion perhaps in 80s. It is no history of drug use. There is no history of tattoos.  Patient has history of significant alcohol use. The patient claims that hepatitis \"disappeared\". She used to drink perhaps 4-5 drinks a day, 4-5 times a week. The patient started drinking at age 18. She quit alcohol about 10 years ago in 2008. The patient was told to stop alcohol physician. Since then, the patient drinks \"non-alcoholic beer\".  More recently the patient was turned down by blood bank.  There is no history of pancreatic disease. Used to be anemic in the past. There is no family history of colon cancer or inflammatory " bowel disease. Her younger brother however has some liver issues.       Review of Systems   Constitutional: Negative for appetite change, chills, fatigue, fever and unexpected weight change.   HENT: Negative for mouth sores, nosebleeds and trouble swallowing.    Eyes: Negative for discharge and redness.   Respiratory: Negative for apnea, cough and shortness of breath.    Cardiovascular: Negative for chest pain, palpitations and leg swelling.   Gastrointestinal: Negative for abdominal distention, abdominal pain, anal bleeding, blood in stool, constipation, diarrhea, nausea and vomiting.   Endocrine: Negative for cold intolerance, heat intolerance and polydipsia.   Genitourinary: Negative for dysuria, hematuria and urgency.   Musculoskeletal: Positive for arthralgias. Negative for joint swelling and myalgias.   Skin: Negative for rash.   Allergic/Immunologic: Negative for food allergies and immunocompromised state.   Neurological: Negative for dizziness, seizures, syncope and headaches.   Hematological: Negative for adenopathy. Bruises/bleeds easily.   Psychiatric/Behavioral: Negative for dysphoric mood. The patient is not nervous/anxious and is not hyperactive.      Patient Active Problem List   Diagnosis   • Essential hypertension   • Chronic pain of right knee   • Chronic neck pain   • Allergic rhinitis due to allergen     Past Medical History:   Diagnosis Date   • Hay fever    • Hypertension      Past Surgical History:   Procedure Laterality Date   • CATARACT EXTRACTION     • COLONOSCOPY     • KNEE SURGERY Left 2005   • TONSILLECTOMY     • TUBAL ABDOMINAL LIGATION       Family History   Problem Relation Age of Onset   • Diabetes Mother    • Heart attack Mother    • Stroke Mother    • Diabetes Father    • Heart attack Father    • Stroke Father    • Hyperlipidemia Father    • Hypertension Father    • Colon polyps Brother    • Diabetes Brother    • Cancer Paternal Aunt         stomach   • Diabetes Sister      Social  "History   Substance Use Topics   • Smoking status: Current Every Day Smoker     Packs/day: 0.50     Types: Cigarettes   • Smokeless tobacco: Never Used   • Alcohol use Yes       Current Outpatient Prescriptions:   •  aspirin 81 MG EC tablet, Take 1 tablet by mouth Daily., Disp: 30 tablet, Rfl: 5  •  HYDROcodone-acetaminophen (NORCO)  MG per tablet, Take 1 tablet by mouth Every 6 (Six) Hours As Needed for Moderate Pain ., Disp: , Rfl:   •  lisinopril (PRINIVIL,ZESTRIL) 20 MG tablet, Take 1 tablet by mouth Daily., Disp: 30 tablet, Rfl: 5    No Known Allergies    Blood pressure 113/66, pulse 62, temperature 97.2 °F (36.2 °C), resp. rate 14, height 157.2 cm (61.89\"), weight 54.4 kg (120 lb).    Physical Exam   Constitutional: She is oriented to person, place, and time. She appears well-developed and well-nourished. No distress.   HENT:   Head: Normocephalic and atraumatic.   Right Ear: Hearing and external ear normal.   Left Ear: Hearing and external ear normal.   Nose: Nose normal.   Mouth/Throat: Oropharynx is clear and moist and mucous membranes are normal. Mucous membranes are not pale, not dry and not cyanotic. No oral lesions. No oropharyngeal exudate.   Eyes: Conjunctivae and EOM are normal. Right eye exhibits no discharge. Left eye exhibits no discharge. No scleral icterus.   Neck: Trachea normal. Neck supple. No JVD present. No edema present. No thyroid mass and no thyromegaly present.   Cardiovascular: Normal rate, regular rhythm, S2 normal and normal heart sounds.  Exam reveals no gallop, no S3 and no friction rub.    No murmur heard.  Pulmonary/Chest: Effort normal and breath sounds normal. No respiratory distress. She has no wheezes. She has no rales. She exhibits no tenderness.   Abdominal: Soft. Normal appearance and bowel sounds are normal. She exhibits no distension, no ascites and no mass. There is no splenomegaly or hepatomegaly. There is no tenderness. There is no rigidity, no rebound and no " guarding. No hernia.   Musculoskeletal: She exhibits no tenderness or deformity.     Vascular Status -  Her right foot exhibits no edema. Her left foot exhibits no edema.  Lymphadenopathy:     She has no cervical adenopathy.        Left: No supraclavicular adenopathy present.   Neurological: She is alert and oriented to person, place, and time. She has normal strength. No cranial nerve deficit or sensory deficit. She exhibits normal muscle tone. Coordination normal.   Skin: No rash noted. She is not diaphoretic. No cyanosis. No pallor. Nails show no clubbing.   Psychiatric: She has a normal mood and affect. Her behavior is normal. Judgment and thought content normal.   Nursing note and vitals reviewed.  Stigmata of chronic liver disease:  Positive palmar erythema.  Asterixis:  None.    Laboratory Testing:  Upon review of medical records:      Dated July 13, 2018 sodium 140 potassium 4.2 chloride 105 CO2 27 BUN 11 serum creatinine 0.80 glucose 88.  Calcium 9.8.  Total protein 8.1.  Albumin 4.10.  T bili 0.8 AST 41 ALT 33 alkaline phosphatase 75.  Total cholesterol 142.  Triglycerides 67.  Hepatitis A IgM negative.  Hepatitis B surface antigen negative.  Hepatitis B core IgM negative.  Hepatitis C virus antibody positive at >11.0.    Assessment:      ICD-10-CM ICD-9-CM   1. Colon cancer screening Z12.11 V76.51   2. Diarrhea, unspecified type R19.7 787.91   3. Hepatitis C antibody positive in blood R76.8 795.79         Discussion:  1.     Plan/  Patient Instructions   Avoid alcohol.  Of note the patient was found to have hepatitis C antibody positive. Although, her LFTs are normal, this likely represents underlying hep C.   Await HCV RNA by PCR.  The patient was counseled regarding hepatitis C. This included the nature of the illness, mode of transmission, risk factors, natural history, aggravating factors, complications, precautions, and treatment options in detail.  Colonoscopy: Description of the procedure, risks  benefits alternatives and options including non-operative options were discussed with the patient in detail.  The patient understands and wishes to proceed.         Sen Morley MD

## 2018-07-24 NOTE — PATIENT INSTRUCTIONS
Avoid alcohol.  Of note the patient was found to have hepatitis C antibody positive. Although, her LFTs are normal, this likely represents underlying hep C.   Await HCV RNA by PCR.  The patient was counseled regarding hepatitis C. This included the nature of the illness, mode of transmission, risk factors, natural history, aggravating factors, complications, precautions, and treatment options in detail.  Colonoscopy: Description of the procedure, risks benefits alternatives and options including non-operative options were discussed with the patient in detail.  The patient understands and wishes to proceed.

## 2018-07-29 LAB
HCV GENTYP SERPL NAA+PROBE: NORMAL
HCV RNA SERPL NAA+PROBE-ACNC: NORMAL IU/ML
HCV RNA SERPL NAA+PROBE-LOG IU: 6.43 LOG10 IU/ML
LABORATORY COMMENT REPORT: NORMAL
TEST INFORMATION: NORMAL

## 2018-08-09 ENCOUNTER — TELEPHONE (OUTPATIENT)
Dept: INTERNAL MEDICINE | Facility: CLINIC | Age: 68
End: 2018-08-09

## 2018-08-09 RX ORDER — SODIUM CHLORIDE 9 MG/ML
70 INJECTION, SOLUTION INTRAVENOUS CONTINUOUS PRN
Status: CANCELLED | OUTPATIENT
Start: 2018-08-09 | End: 2019-08-09

## 2018-08-10 PROBLEM — Z12.11 COLON CANCER SCREENING: Status: ACTIVE | Noted: 2018-08-10

## 2018-08-10 PROBLEM — R19.7 DIARRHEA: Status: ACTIVE | Noted: 2018-08-10

## 2018-08-16 ENCOUNTER — ANESTHESIA (OUTPATIENT)
Dept: GASTROENTEROLOGY | Facility: HOSPITAL | Age: 68
End: 2018-08-16

## 2018-08-16 ENCOUNTER — HOSPITAL ENCOUNTER (OUTPATIENT)
Facility: HOSPITAL | Age: 68
Setting detail: HOSPITAL OUTPATIENT SURGERY
Discharge: HOME | End: 2018-08-16
Attending: INTERNAL MEDICINE | Admitting: INTERNAL MEDICINE

## 2018-08-16 ENCOUNTER — ANESTHESIA EVENT (OUTPATIENT)
Dept: GASTROENTEROLOGY | Facility: HOSPITAL | Age: 68
End: 2018-08-16

## 2018-08-16 VITALS
OXYGEN SATURATION: 100 % | HEART RATE: 76 BPM | RESPIRATION RATE: 18 BRPM | HEIGHT: 62 IN | SYSTOLIC BLOOD PRESSURE: 118 MMHG | DIASTOLIC BLOOD PRESSURE: 79 MMHG | WEIGHT: 119 LBS | TEMPERATURE: 97.9 F | BODY MASS INDEX: 21.9 KG/M2

## 2018-08-16 DIAGNOSIS — R19.7 DIARRHEA: ICD-10-CM

## 2018-08-16 DIAGNOSIS — Z12.11 COLON CANCER SCREENING: ICD-10-CM

## 2018-08-16 PROCEDURE — 25010000002 PROPOFOL 1000 MG/100ML EMULSION: Performed by: NURSE ANESTHETIST, CERTIFIED REGISTERED

## 2018-08-16 PROCEDURE — 45381 COLONOSCOPY SUBMUCOUS NJX: CPT | Performed by: INTERNAL MEDICINE

## 2018-08-16 PROCEDURE — 45380 COLONOSCOPY AND BIOPSY: CPT | Performed by: INTERNAL MEDICINE

## 2018-08-16 PROCEDURE — 25010000002 PROPOFOL 1000 MG/ML EMULSION: Performed by: NURSE ANESTHETIST, CERTIFIED REGISTERED

## 2018-08-16 PROCEDURE — 88305 TISSUE EXAM BY PATHOLOGIST: CPT | Performed by: INTERNAL MEDICINE

## 2018-08-16 PROCEDURE — 45385 COLONOSCOPY W/LESION REMOVAL: CPT | Performed by: INTERNAL MEDICINE

## 2018-08-16 DEVICE — CLIPPING DEVICE
Type: IMPLANTABLE DEVICE | Status: FUNCTIONAL
Brand: RESOLUTION CLIP

## 2018-08-16 RX ORDER — GLYCOPYRROLATE 0.2 MG/ML
INJECTION INTRAMUSCULAR; INTRAVENOUS AS NEEDED
Status: DISCONTINUED | OUTPATIENT
Start: 2018-08-16 | End: 2018-08-17 | Stop reason: SURG

## 2018-08-16 RX ORDER — LIDOCAINE HYDROCHLORIDE 20 MG/ML
INJECTION, SOLUTION INFILTRATION; PERINEURAL AS NEEDED
Status: DISCONTINUED | OUTPATIENT
Start: 2018-08-16 | End: 2018-08-17 | Stop reason: SURG

## 2018-08-16 RX ORDER — PROPOFOL 10 MG/ML
INJECTION, EMULSION INTRAVENOUS AS NEEDED
Status: DISCONTINUED | OUTPATIENT
Start: 2018-08-16 | End: 2018-08-17 | Stop reason: SURG

## 2018-08-16 RX ORDER — SODIUM CHLORIDE 9 MG/ML
70 INJECTION, SOLUTION INTRAVENOUS CONTINUOUS PRN
Status: DISCONTINUED | OUTPATIENT
Start: 2018-08-16 | End: 2018-08-16 | Stop reason: HOSPADM

## 2018-08-16 RX ORDER — PROPOFOL 10 MG/ML
INJECTION, EMULSION INTRAVENOUS AS NEEDED
Status: DISCONTINUED | OUTPATIENT
Start: 2018-08-16 | End: 2018-08-16

## 2018-08-16 RX ORDER — KETAMINE HYDROCHLORIDE 50 MG/ML
INJECTION, SOLUTION, CONCENTRATE INTRAMUSCULAR; INTRAVENOUS AS NEEDED
Status: DISCONTINUED | OUTPATIENT
Start: 2018-08-16 | End: 2018-08-17 | Stop reason: SURG

## 2018-08-16 RX ADMIN — SODIUM CHLORIDE: 9 INJECTION, SOLUTION INTRAVENOUS at 12:12

## 2018-08-16 RX ADMIN — PROPOFOL 50 MG: 10 INJECTION, EMULSION INTRAVENOUS at 12:23

## 2018-08-16 RX ADMIN — KETAMINE HYDROCHLORIDE 20 MG: 50 INJECTION, SOLUTION INTRAMUSCULAR; INTRAVENOUS at 12:23

## 2018-08-16 RX ADMIN — PROPOFOL 100 MCG/KG/MIN: 10 INJECTION, EMULSION INTRAVENOUS at 12:20

## 2018-08-16 RX ADMIN — GLYCOPYRROLATE 0.2 MG: 0.2 INJECTION, SOLUTION INTRAMUSCULAR; INTRAVENOUS at 13:11

## 2018-08-16 RX ADMIN — SODIUM CHLORIDE 70 ML/HR: 9 INJECTION, SOLUTION INTRAVENOUS at 09:19

## 2018-08-16 RX ADMIN — LIDOCAINE HYDROCHLORIDE 40 MG: 20 INJECTION, SOLUTION INFILTRATION; PERINEURAL at 12:23

## 2018-08-16 NOTE — INTERVAL H&P NOTE
"  H&P reviewed. The patient was examined and there are no changes to the H&P.       No recent shortness of breath or chest pains.      Blood pressure 109/68, pulse 87, temperature 97.6 °F (36.4 °C), temperature source Temporal Artery , resp. rate 16, height 157.5 cm (62\"), weight 54 kg (119 lb), SpO2 100 %, not currently breastfeeding.    Chest: clear to auscultation.  Cardiac exam: No S3 no murmurs.   Abdomen: soft bowel sounds present nondistended nontender.        "

## 2018-08-16 NOTE — ANESTHESIA POSTPROCEDURE EVALUATION
Patient: Mya Hawkins    Procedure Summary     Date:  08/16/18 Room / Location:  [facility] ENDOSCOPY 2 / [facility] ENDOSCOPY    Anesthesia Start:  1212 Anesthesia Stop:      Procedure:  COLONOSCOPY hot snare polypectomy x2, saline injection, cold biopsies, tattoo injection, resolution clip x2 (N/A Anus) Diagnosis:       Diarrhea      Colon cancer screening      (Diarrhea [R19.7])      (Colon cancer screening [Z12.11])    Surgeon:  Sen Morley MD Provider:  Elijah Yepez CRNA    Anesthesia Type:  MAC ASA Status:  2          Anesthesia Type: MAC  Last vitals  BP   118/79 (08/16/18 1356)   Temp   97.9 °F (36.6 °C) (08/16/18 1326)   Pulse   76 (08/16/18 1356)   Resp   18 (08/16/18 1356)     SpO2   100 % (08/16/18 1356)     Post Anesthesia Care and Evaluation    Patient location during evaluation: PHASE II  Patient participation: complete - patient participated  Level of consciousness: awake and alert  Pain score: 0  Pain management: satisfactory to patient  Airway patency: patent  Anesthetic complications: No anesthetic complications  PONV Status: none  Cardiovascular status: acceptable and hemodynamically stable  Respiratory status: acceptable  Hydration status: acceptable

## 2018-08-16 NOTE — DISCHARGE INSTRUCTIONS
"Rest today  No pushing,pulling,tugging,heavy lifting, or strenuous activity   No major decision making,driving,or drinking alcoholic beverages for 24 hours due to the sedation you received  Always use good hand hygiene/washing technique  No driving on pain medication.      Postprocedure instructions:    Nothing by mouth to fully alert.  Once fully alert may have clear liquid diet.  Advance diet as tolerated.  Vital signs as routine.    Diet:     Avoid Dairy Products.  Fiber One Cereal-40% Nutty Clusters 1/4 cup daily.  Jadiel's All Bran-Bran Buds 1/4 cup daily.  Use Soy or [place] milk.      Blood Thinner Directions:    Avoid Aspirin & other NSAIDS for _7__ days. Tylenol is okay.    Treatments:    May take \"Imodium - AD\" over-the-counter.Take 1/2 tablet at night orally. The dose may be increased to 1/2 tablet twice a day if needed.   Adjust the dose to have 1-2 soft stools a day.      Call [facility] at [phone] or come to the Emergency Department if you experience the following: Chest pain, abdominal pain, bleeding (vomiting of blood or coffee colored material, black stools or delia blood in stools), fever/chills, nausea and vomiting or dizziness.      Follow-up:  DR. JOEL BURGOS in 4 weeks.Office phone # [phone].    Follow-up colonoscopy: in 3 years.          ************************************************************************************      Notes to the patient and the family from your Doctor    Dear patient/family member,    Findings on today's procedure are as follows:    1. Diverticulosis.  2. Colon polyps. 2 polyps were removed. 2 clips were placed to prevent bleeding or hole in the intestine. These clips will usually pass pass on their own in 30 days. See the attached instructions regarding MRI   3. There is no evidence of colitis or inflammation in the colon.  4.   There is no suggestion of cancer.    Recommendations:    1. See above.      Should you have more questions please " do not hesitate to talk to the nurse who can call me and let me talk to you.  I hope you continue to feel better.    Sen Morley M.D., FACP, FACG.

## 2018-08-16 NOTE — ANESTHESIA PREPROCEDURE EVALUATION
Anesthesia Evaluation     Patient summary reviewed and Nursing notes reviewed   NPO Solid Status: > 8 hours  NPO Liquid Status: > 8 hours           Airway   Mallampati: II  TM distance: >3 FB  Neck ROM: full  No difficulty expected  Dental - normal exam   (+) partials        Pulmonary - normal exam   (+) a smoker Current Abstained day of surgery,   Cardiovascular - normal exam  Exercise tolerance: good (4-7 METS)    PT is on anticoagulation therapy    (+) hypertension well controlled less than 2 medications,     ROS comment: ASA 81mg     Neuro/Psych- negative ROS  GI/Hepatic/Renal/Endo    (+)   hepatitis C, liver disease,     Musculoskeletal     (+) neck pain,   Abdominal  - normal exam   Substance History - negative use     OB/GYN negative ob/gyn ROS         Other            Phys Exam Other: Metal colored covering noted to upper incisiors              Anesthesia Plan    ASA 2     MAC   (Patient advised that intravenous sedation would be utilized as primary anesthetic technique. Every effort will be made to make sure patient is comfortable. Patient advised that they may experience recall of events of the procedure. Patient verbalized understanding and agreed to plan. )  intravenous induction   Anesthetic plan and risks discussed with patient.

## 2018-08-16 NOTE — OP NOTE
PROCEDURE:  Colonoscopy to the terminal ileum with 2 hot snare polypectomies, submucosal injection of normal saline andSubmucosal injection of Nadia ink 0.25 ml was undertaken for marking., And placement of 2 resolution clips to coapt the margins as well as biopsies.     DATE OF PROCEDURE:  August 16, 2018    REFERRING PROVIDER:  Garland Rodriguez DO     INSTRUMENT USED:  Olympus PCF H180 AL videocolonoscope.       INDICATIONS OF THE PROCEDURE:  This is a 68-year-old female for colon cancer screening. There is history of diarrhea for the last couple of years. No family history of colon cancer.     PREVIOUS COLONOSCOPY: 11 years ago.    BIOPSIES:  2 hot snare polypectomies one at hepatic flexure and 1 at distal transverse colon. Biopsies were obtained from the junction of the cecum and ascending colon polyp area.      PHOTOGRAPHS:  Photographs were included in the medical records.     MEDICATIONS:  MAC.       CONSENT/PREPROCEDURE EVALUATION:  Risks, benefits, alternatives and options of the procedure including risks of sedation/anesthesia were discussed with the patient and informed consent was obtained prior to the procedure.  History and physical examination were performed and nothing precluded the test.      REPORT:  The patient was placed in left lateral decubitus position and a digital examination was performed.  Once under the influence of IV sedation, the instrument was inserted into the rectum and advanced under direct vision to cecum which was identified by the ileocecal valve, triradiate folds and appendiceal orifice. The scope was then maneuvered into the terminal ileum.        FINDINGS:      Digital rectal examination:  Good anal tone.  No perianal pathology.  No mass.        Terminal ileum:  7-8 cm.  Normal.     Cecum and ascending colon: Normal. Somewhat polypoid appearance of the mucosa was seen at the junction of the bulb but the second portion. This was biopsied.  Scant diverticular change was  seen.     Hepatic flexure, transverse colon, splenic flexure:  An 11-12 mm flat sessile polyp was noted at the hepatic flexure. Submucosal injection of normal saline 1 ml was undertaken to raise the area. The polyp was then removed with the hot snare. Margins were coapt using a resolution clip. Submucosal injection of Nadia ink 0.5 ml was undertaken for marking.    Scant diverticular change was seen in the right colon.    A 10 mm sessile polyp was noted in the distal transverse colon. This was removed with hot snare. Margins were coapt using a resolution clip.     Descending colon, sigmoid colon and rectum: Scant diverticulosis was noted. No endoscopic evidence of colitis was seen.  Random biopsies were obtained from the colon upon withdrawal of the scope. A retroflex examination within the rectum revealed internal hemorrhoids.        The scope was then straightened, the lower GI tract was decompressed, and the scope was pulled out of the patient.  The patient tolerated the procedure well.  There were no immediate complications and the patient was transferred in stable condition for post procedure observation.      TECHNICAL DATA:   1. Ponce prep score: 8 (3+2+3).    2. Anus to cecal time:  7 minutes.  3. Difficulty of examination:  Average.  Tortuous redundant colon. Withdrawal time: 20 minutes.  4. Procedure time: 52 minutes  5. Retroflex examination in right colon: Yes.    6. Second look Rectum to cecum with decompression: Yes.    DIAGNOSES:    1. Scant diverticulosis-pan.  2. Colon polyps. 2 were removed 10-12 mm in size.  3. Internal hemorrhoids.  4. No endoscopic evidence of colitis was seen.  Random biopsies were obtained from the colon upon withdrawal of the scope.      RECOMMENDATIONS:     1. Dietary instructions.  2. Follow biopsies.    3. Follow-up:      4. Followup colonoscopy:  3 years; in view of the size of the polyps.          Thank you very much for letting me participate in the care of this  patient. Please do not hesitate to call me if you have any questions.

## 2018-08-23 LAB
LAB AP CASE REPORT: NORMAL
PATH REPORT.FINAL DX SPEC: NORMAL

## 2018-09-27 ENCOUNTER — LAB (OUTPATIENT)
Dept: LAB | Facility: HOSPITAL | Age: 68
End: 2018-09-27
Attending: INTERNAL MEDICINE

## 2018-09-27 ENCOUNTER — OFFICE VISIT (OUTPATIENT)
Dept: GASTROENTEROLOGY | Facility: CLINIC | Age: 68
End: 2018-09-27

## 2018-09-27 VITALS
WEIGHT: 117 LBS | HEIGHT: 62 IN | DIASTOLIC BLOOD PRESSURE: 74 MMHG | TEMPERATURE: 97.2 F | SYSTOLIC BLOOD PRESSURE: 135 MMHG | BODY MASS INDEX: 21.53 KG/M2 | HEART RATE: 57 BPM

## 2018-09-27 DIAGNOSIS — Z11.59 ENCOUNTER FOR SCREENING FOR OTHER VIRAL DISEASES (CODE): ICD-10-CM

## 2018-09-27 DIAGNOSIS — B18.2 CHRONIC HEPATITIS C WITHOUT HEPATIC COMA (HCC): ICD-10-CM

## 2018-09-27 DIAGNOSIS — B18.2 CHRONIC HEPATITIS C WITHOUT HEPATIC COMA (HCC): Primary | ICD-10-CM

## 2018-09-27 DIAGNOSIS — R19.7 DIARRHEA, UNSPECIFIED TYPE: ICD-10-CM

## 2018-09-27 DIAGNOSIS — D12.3 ADENOMATOUS POLYP OF TRANSVERSE COLON: ICD-10-CM

## 2018-09-27 LAB
ALBUMIN SERPL-MCNC: 4.7 G/DL (ref 3.5–5)
ALBUMIN/GLOB SERPL: 1.1 G/DL (ref 1–2)
ALP SERPL-CCNC: 102 U/L (ref 38–126)
ALT SERPL W P-5'-P-CCNC: 48 U/L (ref 13–69)
ANION GAP SERPL CALCULATED.3IONS-SCNC: 11.1 MMOL/L (ref 10–20)
AST SERPL-CCNC: 41 U/L (ref 15–46)
BILIRUB SERPL-MCNC: 0.4 MG/DL (ref 0.2–1.3)
BUN BLD-MCNC: 11 MG/DL (ref 7–20)
BUN/CREAT SERPL: 13.8 (ref 7.1–23.5)
CALCIUM SPEC-SCNC: 9.9 MG/DL (ref 8.4–10.2)
CHLORIDE SERPL-SCNC: 107 MMOL/L (ref 98–107)
CO2 SERPL-SCNC: 28 MMOL/L (ref 26–30)
CREAT BLD-MCNC: 0.8 MG/DL (ref 0.6–1.3)
FERRITIN SERPL-MCNC: 339 NG/ML (ref 11.1–264)
GFR SERPL CREATININE-BSD FRML MDRD: 86 ML/MIN/1.73
GLOBULIN UR ELPH-MCNC: 4.3 GM/DL
GLUCOSE BLD-MCNC: 89 MG/DL (ref 74–98)
IRON 24H UR-MRATE: 87 MCG/DL (ref 37–181)
IRON SATN MFR SERPL: 23 % (ref 11–46)
POTASSIUM BLD-SCNC: 4.1 MMOL/L (ref 3.5–5.1)
PROT SERPL-MCNC: 9 G/DL (ref 6.3–8.2)
SODIUM BLD-SCNC: 142 MMOL/L (ref 137–145)
TIBC SERPL-MCNC: 375 MCG/DL (ref 261–497)

## 2018-09-27 PROCEDURE — 86038 ANTINUCLEAR ANTIBODIES: CPT | Performed by: INTERNAL MEDICINE

## 2018-09-27 PROCEDURE — 82104 ALPHA-1-ANTITRYPSIN PHENO: CPT

## 2018-09-27 PROCEDURE — 83516 IMMUNOASSAY NONANTIBODY: CPT

## 2018-09-27 PROCEDURE — 86706 HEP B SURFACE ANTIBODY: CPT

## 2018-09-27 PROCEDURE — 82728 ASSAY OF FERRITIN: CPT

## 2018-09-27 PROCEDURE — 86225 DNA ANTIBODY NATIVE: CPT

## 2018-09-27 PROCEDURE — 86705 HEP B CORE ANTIBODY IGM: CPT

## 2018-09-27 PROCEDURE — 86708 HEPATITIS A ANTIBODY: CPT

## 2018-09-27 PROCEDURE — 86709 HEPATITIS A IGM ANTIBODY: CPT

## 2018-09-27 PROCEDURE — 87340 HEPATITIS B SURFACE AG IA: CPT

## 2018-09-27 PROCEDURE — 80053 COMPREHEN METABOLIC PANEL: CPT

## 2018-09-27 PROCEDURE — 86704 HEP B CORE ANTIBODY TOTAL: CPT

## 2018-09-27 PROCEDURE — 82103 ALPHA-1-ANTITRYPSIN TOTAL: CPT

## 2018-09-27 PROCEDURE — 83540 ASSAY OF IRON: CPT

## 2018-09-27 PROCEDURE — 99214 OFFICE O/P EST MOD 30 MIN: CPT | Performed by: INTERNAL MEDICINE

## 2018-09-27 PROCEDURE — 36415 COLL VENOUS BLD VENIPUNCTURE: CPT

## 2018-09-27 PROCEDURE — 83550 IRON BINDING TEST: CPT

## 2018-09-27 RX ORDER — HYDROCODONE BITARTRATE AND ACETAMINOPHEN 10; 325 MG/1; MG/1
TABLET ORAL
COMMUNITY
End: 2021-04-21

## 2018-09-27 RX ORDER — CETIRIZINE HYDROCHLORIDE 10 MG/1
10 TABLET ORAL DAILY
Refills: 5 | COMMUNITY
Start: 2018-09-24 | End: 2018-10-02

## 2018-09-28 LAB
A1AT SERPL-MCNC: 187 MG/DL (ref 90–200)
ACTIN IGG SERPL-ACNC: 16 UNITS (ref 0–19)
ANA SER QL: POSITIVE
DEPRECATED MITOCHONDRIA M2 IGG SER-ACNC: <20 UNITS (ref 0–20)
DSDNA AB SER-ACNC: 1 IU/ML (ref 0–9)
HAV AB SER QL IA: NEGATIVE
HAV IGM SERPL QL IA: NEGATIVE
HBV CORE AB SER DONR QL IA: POSITIVE
HBV CORE IGM SERPL QL IA: NEGATIVE
HBV SURFACE AB SER QL: REACTIVE
HBV SURFACE AG SERPL QL IA: NEGATIVE
Lab: NORMAL

## 2018-10-02 ENCOUNTER — OFFICE VISIT (OUTPATIENT)
Dept: INTERNAL MEDICINE | Facility: CLINIC | Age: 68
End: 2018-10-02

## 2018-10-02 ENCOUNTER — TELEPHONE (OUTPATIENT)
Dept: INTERNAL MEDICINE | Facility: CLINIC | Age: 68
End: 2018-10-02

## 2018-10-02 VITALS
BODY MASS INDEX: 21.16 KG/M2 | OXYGEN SATURATION: 100 % | TEMPERATURE: 97.8 F | WEIGHT: 115 LBS | HEIGHT: 62 IN | HEART RATE: 58 BPM | SYSTOLIC BLOOD PRESSURE: 107 MMHG | DIASTOLIC BLOOD PRESSURE: 58 MMHG

## 2018-10-02 DIAGNOSIS — Z23 NEED FOR SHINGLES VACCINE: ICD-10-CM

## 2018-10-02 DIAGNOSIS — Z12.11 ENCOUNTER FOR SCREENING COLONOSCOPY: ICD-10-CM

## 2018-10-02 DIAGNOSIS — B18.2 CHRONIC HEPATITIS C WITHOUT HEPATIC COMA (HCC): Primary | ICD-10-CM

## 2018-10-02 PROCEDURE — 99214 OFFICE O/P EST MOD 30 MIN: CPT | Performed by: INTERNAL MEDICINE

## 2018-10-02 RX ORDER — CETIRIZINE HYDROCHLORIDE 10 MG/1
10 TABLET ORAL DAILY PRN
COMMUNITY
End: 2019-06-19 | Stop reason: SDUPTHER

## 2018-10-02 NOTE — PROGRESS NOTES
Chief Complaint   Patient presents with   • Follow-up     patient had colonoscopy with Dr Morley; 2 polyps removed   • Hepatitis C       Subjective     History of Present Illness   Mya Hawkins is a 68 y.o. female presented for follow-up on medical problems.  Patient shares that she followed up with Dr. Morley and was told her colonoscopy was normal with just 2 polyps.  She mentioned her hepatitis and states that she was not sure about whether they would give her treatment for hepatitis.  She does have a follow-up appointment at which time she may be able to discuss this.    As far as her history of vaccinations, patient has had shingles and pneumonia vaccines at [facility].  Tetanus vaccine was given at the health department area    The following portions of the patient's history were reviewed and updated as appropriate: allergies, current medications, past family history, past medical history, past social history, past surgical history and problem list.    Review of Systems   Constitutional: Negative for chills, fatigue and fever.   HENT: Negative for congestion, ear pain, rhinorrhea, sinus pressure and sore throat.    Eyes: Negative for visual disturbance.   Respiratory: Negative for cough, chest tightness, shortness of breath and wheezing.    Cardiovascular: Negative for chest pain, palpitations and leg swelling.   Gastrointestinal: Negative for abdominal pain, blood in stool, constipation, diarrhea, nausea and vomiting.   Endocrine: Negative for polydipsia and polyuria.   Genitourinary: Negative for dysuria and hematuria.   Musculoskeletal: Negative for back pain.   Skin: Negative for rash.   Neurological: Negative for dizziness, light-headedness, numbness and headaches.   Psychiatric/Behavioral: Negative for dysphoric mood and sleep disturbance. The patient is not nervous/anxious.        No Known Allergies    Past Medical History:   Diagnosis Date   • Body piercing     EARS   • Hay fever     REPORTS  "\"WHEN I LIVED IN [place] IN [place]\"   • Hepatitis C    • History of bronchitis    • History of transfusion     REPORTS SHE THINKS SHE HAS HAD A TRANSFUSION IN THE PAST BUT IS NOT CERTAIN. DID REPORTS IF SHE HAD A TRANSFUSION THAT THER IS NO KNOWN REACTION   • Hypertension    • Seasonal allergies    • Wears glasses     RX   • Wears partial dentures     REPORTS SHE WEARS A PARTIAL IN LOWER MOUTH - INSTRUCTED NO ADHESIVES THE DOS       Social History     Social History   • Marital status:      Spouse name: N/A   • Number of children: N/A   • Years of education: N/A     Occupational History   • Not on file.     Social History Main Topics   • Smoking status: Current Every Day Smoker     Packs/day: 0.50     Years: 50.00     Types: Cigarettes   • Smokeless tobacco: Never Used      Comment: REPORTS SHE SMOKES \"OFF AND ON SINCE AGE 18\"   • Alcohol use Yes      Comment: SOCIAL USE, REPORTS NO HX OF ETOH ABUSE   • Drug use: No   • Sexual activity: Defer     Other Topics Concern   • Not on file     Social History Narrative   • No narrative on file        Past Surgical History:   Procedure Laterality Date   • CATARACT EXTRACTION Left    • COLONOSCOPY     • COLONOSCOPY N/A 8/16/2018    Procedure: COLONOSCOPY hot snare polypectomy x2, saline injection, cold biopsies, tattoo injection, resolution clip x3;  Surgeon: Sen Morley MD;  Location: [facility] ENDOSCOPY;  Service: Gastroenterology   • JOINT REPLACEMENT Left     KNEE REPLACEMENT, THEN LATER HAD REVISION OF LEFT KNEE   • TONSILLECTOMY     • TUBAL ABDOMINAL LIGATION         Family History   Problem Relation Age of Onset   • Diabetes Mother    • Heart attack Mother    • Stroke Mother    • Diabetes Father    • Heart attack Father    • Stroke Father    • Hyperlipidemia Father    • Hypertension Father    • Colon polyps Brother    • Diabetes Brother    • Cancer Paternal Aunt         stomach   • Diabetes Sister          Current Outpatient " "Prescriptions:   •  aspirin 81 MG EC tablet, Take 1 tablet by mouth Daily., Disp: 30 tablet, Rfl: 5  •  cetirizine (zyrTEC) 10 MG tablet, Take 10 mg by mouth Daily., Disp: , Rfl:   •  HYDROcodone-acetaminophen (NORCO)  MG per tablet, Norco 10 mg-325 mg tablet  1 PO TID-QID PRN PAIN TO LAST FOR 30 DAYS, Disp: , Rfl:   •  lisinopril (PRINIVIL,ZESTRIL) 20 MG tablet, Take 1 tablet by mouth Daily., Disp: 30 tablet, Rfl: 5  •  Zoster Vac Recomb Adjuvanted 50 MCG reconstituted suspension, Inject 1 each into the appropriate muscle as directed by prescriber 1 (One) Time for 1 dose., Disp: 1 each, Rfl: 1    Objective   /58 (BP Location: Left arm)   Pulse 58   Temp 97.8 °F (36.6 °C)   Ht 157.5 cm (62\")   Wt 52.2 kg (115 lb)   LMP  (LMP Unknown)   SpO2 100%   BMI 21.03 kg/m²     Physical Exam   Constitutional: She is oriented to person, place, and time. She appears well-developed and well-nourished.   HENT:   Head: Normocephalic and atraumatic.   Eyes: Conjunctivae are normal.   Pulmonary/Chest: Effort normal.   Musculoskeletal: Normal range of motion.   Neurological: She is alert and oriented to person, place, and time.   Psychiatric: She has a normal mood and affect. Her behavior is normal.   Nursing note and vitals reviewed.      Assessment/Plan   Mya was seen today for follow-up and hepatitis c.    Diagnoses and all orders for this visit:    Chronic hepatitis C without hepatic coma (CMS/HCC)    Encounter for screening colonoscopy    Need for shingles vaccine  -     Zoster Vac Recomb Adjuvanted 50 MCG reconstituted suspension; Inject 1 each into the appropriate muscle as directed by prescriber 1 (One) Time for 1 dose.          Discussion Summary:    68-year-old -American female presenting for follow-up on medical problems.    1.  Screening colonoscopy-results were reviewed.    2.  Hepatitis C-discussed with referrals regarding ensuring patient follows up with gastroenterology for hepatitis C " treatment if possible.    3.  Needs shingles vaccine-sent to pharmacy    Follow up:  Return in about 6 months (around 4/2/2019) for Next scheduled follow up.     Patient Instructions:  Patient instructions were provided.

## 2018-10-02 NOTE — TELEPHONE ENCOUNTER
Patient called and said that pharmacist stated they have to have an order for the Shingles vaccine before they can administer.    [facility] fax [phone]

## 2018-10-03 LAB
A1AT SERPL-MCNC: 185 MG/DL (ref 90–200)
PHENOTYPE: NORMAL

## 2018-10-09 DIAGNOSIS — I10 ESSENTIAL HYPERTENSION: ICD-10-CM

## 2018-10-09 RX ORDER — LISINOPRIL 20 MG/1
20 TABLET ORAL DAILY
Qty: 30 TABLET | Refills: 5 | Status: SHIPPED | OUTPATIENT
Start: 2018-10-09 | End: 2019-04-02 | Stop reason: SDUPTHER

## 2018-11-19 ENCOUNTER — OFFICE VISIT (OUTPATIENT)
Dept: GASTROENTEROLOGY | Facility: CLINIC | Age: 68
End: 2018-11-19

## 2018-11-19 VITALS
TEMPERATURE: 97.2 F | DIASTOLIC BLOOD PRESSURE: 74 MMHG | BODY MASS INDEX: 21.35 KG/M2 | RESPIRATION RATE: 15 BRPM | WEIGHT: 116 LBS | HEIGHT: 62 IN | SYSTOLIC BLOOD PRESSURE: 138 MMHG | HEART RATE: 61 BPM

## 2018-11-19 DIAGNOSIS — D12.3 ADENOMATOUS POLYP OF TRANSVERSE COLON: ICD-10-CM

## 2018-11-19 DIAGNOSIS — R19.7 DIARRHEA, UNSPECIFIED TYPE: ICD-10-CM

## 2018-11-19 DIAGNOSIS — B18.2 CHRONIC HEPATITIS C WITHOUT HEPATIC COMA (HCC): Primary | ICD-10-CM

## 2018-11-19 PROCEDURE — 99214 OFFICE O/P EST MOD 30 MIN: CPT | Performed by: INTERNAL MEDICINE

## 2018-11-19 NOTE — PROGRESS NOTES
"Chief Complaint   Patient presents with   • Hepatic Disease     History of Present Illness    The patient has history of chronic hepatitis C diagnosed about 11 years ago. The patient had blood transfusion perhaps in 1980s. There is no history of drug use. The patient denies history of tattoos. Over the years the patient has history of significant alcohol use. She started drinking at age 18. The patient used to drink 4-5 drinks per day perhaps 4-5 times a week. She quit alcohol about 10 years ago in 2008. The patient had \"hepatitis\" that disappeared. She was advised to avoid alcohol. Since then the patient switched to non-alcoholic beer. The patient used to drink about 4-5 cans of nonalcoholic beer a day. She has cut it down and currently drinks about 3-4 cans of nonalcoholic beer on daily basis. There is no darkening of urine color, lightning of stool color or pruritus. There is no there is no family history of colon cancer, inflammatory bowel disease or chronic liver disease.    Her diarrhea has significantly improved. Currently the patient has been having one and occasionally 2 formed stools a day. There is no constipation.There is no history of overt GI bleed (Hematemesis, melena or hematochezia).  There is no abdominal pain. She denies nausea or vomiting. She denies reflux. There is no dysphagia or odynophagia. There is no recent weight loss. The patient has received first dose of hepatitis vaccination A on October 24, 2018. It appears that the patient has been exposed to hepatitis B in the past, got rid of it and is currently immune to it.     Review of Systems   Constitutional: Negative for appetite change, chills, fatigue, fever and unexpected weight change.   HENT: Negative for mouth sores, nosebleeds and trouble swallowing.    Eyes: Negative for discharge and redness.   Respiratory: Negative for apnea, cough and shortness of breath.    Cardiovascular: Negative for chest pain, palpitations and leg swelling. " "  Gastrointestinal: Negative for abdominal distention, abdominal pain, anal bleeding, blood in stool, constipation, diarrhea, nausea and vomiting.   Endocrine: Negative for cold intolerance, heat intolerance and polydipsia.   Genitourinary: Negative for dysuria, hematuria and urgency.   Musculoskeletal: Positive for arthralgias. Negative for joint swelling and myalgias.   Skin: Negative for rash.   Allergic/Immunologic: Negative for food allergies and immunocompromised state.   Neurological: Negative for dizziness, seizures, syncope and headaches.   Hematological: Negative for adenopathy. Does not bruise/bleed easily.   Psychiatric/Behavioral: Negative for dysphoric mood. The patient is not nervous/anxious and is not hyperactive.      Patient Active Problem List   Diagnosis   • Essential hypertension   • Chronic pain of right knee   • Chronic neck pain   • Allergic rhinitis due to allergen   • Diarrhea   • Colon cancer screening     Past Medical History:   Diagnosis Date   • Body piercing     EARS   • Hay fever     REPORTS \"WHEN I LIVED IN [place] IN [place]\"   • Hepatitis C    • History of bronchitis    • History of transfusion     REPORTS SHE THINKS SHE HAS HAD A TRANSFUSION IN THE PAST BUT IS NOT CERTAIN. DID REPORTS IF SHE HAD A TRANSFUSION THAT THER IS NO KNOWN REACTION   • Hypertension    • Seasonal allergies    • Wears glasses     RX   • Wears partial dentures     REPORTS SHE WEARS A PARTIAL IN LOWER MOUTH - INSTRUCTED NO ADHESIVES THE DOS     Past Surgical History:   Procedure Laterality Date   • CATARACT EXTRACTION Left    • COLONOSCOPY     • JOINT REPLACEMENT Left     KNEE REPLACEMENT, THEN LATER HAD REVISION OF LEFT KNEE   • TONSILLECTOMY     • TUBAL ABDOMINAL LIGATION       Family History   Problem Relation Age of Onset   • Diabetes Mother    • Heart attack Mother    • Stroke Mother    • Diabetes Father    • Heart attack Father    • Stroke Father    • Hyperlipidemia Father    • " "Hypertension Father    • Colon polyps Brother    • Diabetes Brother    • Cancer Paternal Aunt         stomach   • Diabetes Sister      Social History     Tobacco Use   • Smoking status: Current Every Day Smoker     Packs/day: 0.50     Years: 50.00     Pack years: 25.00     Types: Cigarettes   • Smokeless tobacco: Never Used   • Tobacco comment: REPORTS SHE SMOKES \"OFF AND ON SINCE AGE 18\"   Substance Use Topics   • Alcohol use: Yes     Comment: SOCIAL USE, REPORTS NO HX OF ETOH ABUSE       Current Outpatient Medications:   •  aspirin 81 MG EC tablet, Take 1 tablet by mouth Daily., Disp: 30 tablet, Rfl: 5  •  cetirizine (zyrTEC) 10 MG tablet, Take 10 mg by mouth Daily., Disp: , Rfl:   •  HYDROcodone-acetaminophen (NORCO)  MG per tablet, Norco 10 mg-325 mg tablet  1 PO TID-QID PRN PAIN TO LAST FOR 30 DAYS, Disp: , Rfl:   •  lisinopril (PRINIVIL,ZESTRIL) 20 MG tablet, TAKE 1 TABLET BY MOUTH DAILY., Disp: 30 tablet, Rfl: 5    No Known Allergies    Blood pressure 138/74, pulse 61, temperature 97.2 °F (36.2 °C), resp. rate 15, height 157.5 cm (62\"), weight 52.6 kg (116 lb), not currently breastfeeding.    Physical Exam   Constitutional: She is oriented to person, place, and time. She appears well-developed and well-nourished. No distress.   HENT:   Head: Normocephalic and atraumatic.   Right Ear: Hearing and external ear normal.   Left Ear: Hearing and external ear normal.   Nose: Nose normal.   Mouth/Throat: Oropharynx is clear and moist and mucous membranes are normal. Mucous membranes are not pale, not dry and not cyanotic. No oral lesions. No oropharyngeal exudate.   Eyes: Conjunctivae and EOM are normal. Right eye exhibits no discharge. Left eye exhibits no discharge. No scleral icterus.   Neck: Trachea normal. Neck supple. No JVD present. No edema present. No thyroid mass and no thyromegaly present.   Cardiovascular: Normal rate, regular rhythm, S2 normal and normal heart sounds. Exam reveals no gallop, no S3 " and no friction rub.   No murmur heard.  Pulmonary/Chest: Effort normal and breath sounds normal. No respiratory distress. She has no wheezes. She has no rales. She exhibits no tenderness.   Abdominal: Soft. Normal appearance and bowel sounds are normal. She exhibits no distension, no ascites and no mass. There is no splenomegaly or hepatomegaly. There is no tenderness. There is no rigidity, no rebound and no guarding. No hernia.   Musculoskeletal: She exhibits no tenderness or deformity.     Vascular Status -  Her right foot exhibits no edema. Her left foot exhibits no edema.  Lymphadenopathy:     She has no cervical adenopathy.        Left: No supraclavicular adenopathy present.   Neurological: She is alert and oriented to person, place, and time. She has normal strength. No cranial nerve deficit or sensory deficit. She exhibits normal muscle tone. Coordination normal.   Skin: No rash noted. She is not diaphoretic. No cyanosis. No pallor. Nails show no clubbing.   Psychiatric: She has a normal mood and affect. Her behavior is normal. Judgment and thought content normal.   Nursing note and vitals reviewed.  Stigmata of chronic liver disease:  None.  Asterixis:  None.    Laboratory Testing:  Upon review of medical records:    Dated July 13, 2018 sodium 140 potassium 4.2 chloride 105 CO2 27 BUN 11 serum creatinine 0.80 glucose 88.  Calcium 9.8.  Total protein 8.1.  Albumin 4.10.  T bili 0.8 AST 41 ALT 33 alkaline phosphatase 75.  Total cholesterol 142.  Triglycerides 67.  Hepatitis A IgM negative.  Hepatitis B surface antigen negative.  Hepatitis B core IgM negative.  Hepatitis C virus antibody positive at >11.0.     Dated July 18, 2018 HCV by PCR quantitative 2,700,000 IU/mL.  6.431 log 10 IU/mL.     Dated September 27, 2018 sodium 142 potassium 4.1 chloride 107 CO2 28 BUN 11 serum creatinine 0.80 glucose 89.  Calcium 9.9.  Total protein 9.0.  Albumin 4.70.  T bili 0.4 AST 41 ALT 48 alkaline phosphatase 102.  Serum  iron 87.  TIBC 375.  Iron saturation 23%.  Ferritin 339.00.  Alpha-1 antitrypsin level 185.  Phenotype MM.  DAE direct positive.  Smooth muscle antibody negative at 16.  Mitochondrial antibody negative at <20.0.  Hepatitis A IgM negative.  Hepatitis A total antibody negative.  Hepatitis B core IgM negative.  Hepatitis B core total antibody positive.  Hepatitis B surface antibody reactive.  Hepatitis B surface antigen negative.     Procedures:  Upon review of medical records:     Dated August 16, 2018 the patient underwent a colonoscopy to the terminal ileum which revealed: Scant diverticulosis.  Colon polyps.  2 were removed 10-12 mm in size.  Internal hemorrhoids.  No endoscopic evidence of colitis was seen.  Random biopsies were obtained from the colon upon withdrawal of scope.  Transverse colon polyp, hot snare, biopsy revealed tubular adenoma.  Negative for high-grade dysplasia or malignancy.  Ascending colon, proximal polypoid area and cecal junction, biopsy revealed benign colonic mucosa with an intramucosal lymphoid aggregate.  Negative for dysplasia or malignancy.  Hepatic flexure polyp, hot snare, biopsy revealed fragments of tubular adenoma.  Negative for high-grade dysplasia or malignancy.  Random colon, biopsies revealed benign colonic mucosa with no diagnostic abdomen abnormalities.  Negative for chronic or active colitis including lymphocytic or collagen is colitis.  Negative for dysplasia or malignancy.    Assessment:      ICD-10-CM ICD-9-CM   1. Chronic hepatitis C without hepatic coma (CMS/HCC) B18.2 070.54   2. Adenomatous polyp of transverse colon D12.3 211.3   3. Diarrhea, unspecified type R19.7 787.91         Discussion:  1. The patient has received vaccination against hepatitis A. The patient is immune to hepatitis B.    Plan/  Patient Instructions   1. Low-fat high-fiber diet with liberal water intake.  2. STOP alcohol including the so-called non-alcoholic beer which in fact contains  relatively low alcohol levels.    3. The patient was counseled for smoking cessation (Smoking Cessation Counseling asymptomatic//3 minutes).  4. Follow-up colonoscopy in 3 years. August 2021(colon polyps > 10 mm in size).  5. Follow-up in 8-12 weeks.         Sen Morley MD

## 2018-11-19 NOTE — PATIENT INSTRUCTIONS
1. Low-fat high-fiber diet with liberal water intake.  2. STOP alcohol including the so-called non-alcoholic beer which in fact contains relatively low alcohol levels.    3. The patient was counseled for smoking cessation (Smoking Cessation Counseling asymptomatic//3 minutes).  4. Follow-up colonoscopy in 3 years. August 2021(colon polyps > 10 mm in size).  5. Follow-up in 8-12 weeks.

## 2019-01-15 ENCOUNTER — OFFICE VISIT (OUTPATIENT)
Dept: INTERNAL MEDICINE | Facility: CLINIC | Age: 69
End: 2019-01-15

## 2019-01-15 VITALS
TEMPERATURE: 97.1 F | BODY MASS INDEX: 21.35 KG/M2 | OXYGEN SATURATION: 98 % | WEIGHT: 116 LBS | SYSTOLIC BLOOD PRESSURE: 137 MMHG | HEIGHT: 62 IN | HEART RATE: 112 BPM | DIASTOLIC BLOOD PRESSURE: 64 MMHG

## 2019-01-15 DIAGNOSIS — Z23 NEED FOR DIPHTHERIA-TETANUS-PERTUSSIS (TDAP) VACCINE: Primary | ICD-10-CM

## 2019-01-15 DIAGNOSIS — B18.2 CHRONIC HEPATITIS C WITHOUT HEPATIC COMA (HCC): ICD-10-CM

## 2019-01-15 DIAGNOSIS — G89.29 CHRONIC PAIN OF LEFT KNEE: ICD-10-CM

## 2019-01-15 DIAGNOSIS — I10 ESSENTIAL HYPERTENSION: ICD-10-CM

## 2019-01-15 DIAGNOSIS — M25.562 CHRONIC PAIN OF LEFT KNEE: ICD-10-CM

## 2019-01-15 PROCEDURE — 99214 OFFICE O/P EST MOD 30 MIN: CPT | Performed by: INTERNAL MEDICINE

## 2019-01-15 NOTE — PROGRESS NOTES
"Chief Complaint   Patient presents with   • Hypertension     follow up       Subjective     History of Present Illness   Mya Hawkins is a 68 y.o. female presenting for follow up.  Pt has obtained multiple vaccines at [facility].  She cannot recall exactly which vaccines. BP is well controlled with current medications.  She has been feeling well otherwise.  She has established with gastroenterology for concerns of Hepatitis C and has had lab work started. Continues to follow with pain management for right knee pain.     The following portions of the patient's history were reviewed and updated as appropriate: allergies, current medications, past family history, past medical history, past social history, past surgical history and problem list.    Review of Systems   Constitutional: Negative for chills, fatigue and fever.   HENT: Negative for congestion, ear pain, rhinorrhea, sinus pressure and sore throat.    Eyes: Negative for visual disturbance.   Respiratory: Negative for cough, chest tightness, shortness of breath and wheezing.    Cardiovascular: Negative for chest pain, palpitations and leg swelling.   Gastrointestinal: Negative for abdominal pain, blood in stool, constipation, diarrhea, nausea and vomiting.   Endocrine: Negative for polydipsia and polyuria.   Genitourinary: Negative for dysuria and hematuria.   Musculoskeletal: Negative for back pain.   Skin: Negative for rash.   Neurological: Negative for dizziness, light-headedness, numbness and headaches.   Psychiatric/Behavioral: Negative for dysphoric mood and sleep disturbance. The patient is not nervous/anxious.        No Known Allergies    Past Medical History:   Diagnosis Date   • Body piercing     EARS   • Hay fever     REPORTS \"WHEN I LIVED IN [place] IN [place]\"   • Hepatitis C    • History of bronchitis    • History of transfusion     REPORTS SHE THINKS SHE HAS HAD A TRANSFUSION IN THE PAST BUT IS NOT CERTAIN. DID " "REPORTS IF SHE HAD A TRANSFUSION THAT THER IS NO KNOWN REACTION   • Hypertension    • Seasonal allergies    • Wears glasses     RX   • Wears partial dentures     REPORTS SHE WEARS A PARTIAL IN LOWER MOUTH - INSTRUCTED NO ADHESIVES THE DOS       Social History     Socioeconomic History   • Marital status:      Spouse name: Not on file   • Number of children: Not on file   • Years of education: Not on file   • Highest education level: Not on file   Social Needs   • Financial resource strain: Not on file   • Food insecurity - worry: Not on file   • Food insecurity - inability: Not on file   • Transportation needs - medical: Not on file   • Transportation needs - non-medical: Not on file   Occupational History   • Not on file   Tobacco Use   • Smoking status: Current Every Day Smoker     Packs/day: 0.50     Years: 50.00     Pack years: 25.00     Types: Cigarettes   • Smokeless tobacco: Never Used   • Tobacco comment: REPORTS SHE SMOKES \"OFF AND ON SINCE AGE 18\"   Substance and Sexual Activity   • Alcohol use: Yes     Comment: SOCIAL USE, REPORTS NO HX OF ETOH ABUSE   • Drug use: No   • Sexual activity: Defer   Other Topics Concern   • Not on file   Social History Narrative   • Not on file        Past Surgical History:   Procedure Laterality Date   • CATARACT EXTRACTION Left    • COLONOSCOPY     • COLONOSCOPY N/A 8/16/2018    Procedure: COLONOSCOPY hot snare polypectomy x2, saline injection, cold biopsies, tattoo injection, resolution clip x3;  Surgeon: Sen Morley MD;  Location: [facility] ENDOSCOPY;  Service: Gastroenterology   • JOINT REPLACEMENT Left     KNEE REPLACEMENT, THEN LATER HAD REVISION OF LEFT KNEE   • TONSILLECTOMY     • TUBAL ABDOMINAL LIGATION         Family History   Problem Relation Age of Onset   • Diabetes Mother    • Heart attack Mother    • Stroke Mother    • Diabetes Father    • Heart attack Father    • Stroke Father    • Hyperlipidemia Father    • Hypertension Father    • Colon polyps " "Brother    • Diabetes Brother    • Cancer Paternal Aunt         stomach   • Diabetes Sister          Current Outpatient Medications:   •  aspirin 81 MG EC tablet, Take 1 tablet by mouth Daily., Disp: 30 tablet, Rfl: 5  •  cetirizine (zyrTEC) 10 MG tablet, Take 10 mg by mouth Daily., Disp: , Rfl:   •  HYDROcodone-acetaminophen (NORCO)  MG per tablet, Norco 10 mg-325 mg tablet  1 PO TID-QID PRN PAIN TO LAST FOR 30 DAYS, Disp: , Rfl:   •  lisinopril (PRINIVIL,ZESTRIL) 20 MG tablet, TAKE 1 TABLET BY MOUTH DAILY., Disp: 30 tablet, Rfl: 5    Objective   /64 (BP Location: Left arm, Patient Position: Sitting)   Pulse 112   Temp 97.1 °F (36.2 °C)   Ht 157.5 cm (62\")   Wt 52.6 kg (116 lb)   LMP  (LMP Unknown)   SpO2 98%   BMI 21.22 kg/m²     Physical Exam   Constitutional: She is oriented to person, place, and time. She appears well-developed and well-nourished.   HENT:   Head: Normocephalic and atraumatic.   Eyes: Conjunctivae are normal.   Pulmonary/Chest: Effort normal.   Musculoskeletal: Normal range of motion.   Neurological: She is alert and oriented to person, place, and time.   Psychiatric: She has a normal mood and affect. Her behavior is normal.   Nursing note and vitals reviewed.      Assessment/Plan   Mya was seen today for hypertension.    Diagnoses and all orders for this visit:    Need for diphtheria-tetanus-pertussis (Tdap) vaccine  -     Tdap (BOOSTRIX) 5-2.5-18.5 LF-MCG/0.5 injection; Inject 0.5 mL into the appropriate muscle as directed by prescriber 1 (One) Time for 1 dose.    Chronic hepatitis C without hepatic coma (CMS/HCC)    Essential hypertension    Chronic pain of left knee          Discussion Summary:    69 yo AA F presenting for follow up.     1. Chronic Hepatitis C   - pt currently following with Gastroenterology and has recently had labwork which will be followed up.     2. Needs TDAP - sent to pharmacy, vaccine records to be requested to verify she has updated all needed " vaccines.     3. Essential Hypertension - well controlled with current medications.     4. Chronic Left knee pain - cont following with pain management.       Follow up:  Return in about 6 months (around 7/15/2019) for Medicare Wellness.     Patient Instructions:  Patient instructions were provided.

## 2019-01-17 PROBLEM — M25.561 CHRONIC PAIN OF RIGHT KNEE: Status: RESOLVED | Noted: 2018-07-12 | Resolved: 2019-01-17

## 2019-01-17 PROBLEM — G89.29 CHRONIC PAIN OF RIGHT KNEE: Status: RESOLVED | Noted: 2018-07-12 | Resolved: 2019-01-17

## 2019-01-17 PROBLEM — M25.562 CHRONIC PAIN OF LEFT KNEE: Status: ACTIVE | Noted: 2019-01-17

## 2019-01-17 PROBLEM — G89.29 CHRONIC PAIN OF LEFT KNEE: Status: ACTIVE | Noted: 2019-01-17

## 2019-01-30 ENCOUNTER — OFFICE VISIT (OUTPATIENT)
Dept: GASTROENTEROLOGY | Facility: CLINIC | Age: 69
End: 2019-01-30

## 2019-01-30 VITALS
TEMPERATURE: 97.2 F | HEIGHT: 62 IN | SYSTOLIC BLOOD PRESSURE: 116 MMHG | BODY MASS INDEX: 22.08 KG/M2 | RESPIRATION RATE: 16 BRPM | DIASTOLIC BLOOD PRESSURE: 77 MMHG | HEART RATE: 71 BPM | WEIGHT: 120 LBS

## 2019-01-30 DIAGNOSIS — B18.2 CHRONIC HEPATITIS C WITHOUT HEPATIC COMA (HCC): Primary | ICD-10-CM

## 2019-01-30 DIAGNOSIS — D12.3 ADENOMATOUS POLYP OF TRANSVERSE COLON: ICD-10-CM

## 2019-01-30 DIAGNOSIS — R19.7 DIARRHEA, UNSPECIFIED TYPE: ICD-10-CM

## 2019-01-30 PROCEDURE — 99214 OFFICE O/P EST MOD 30 MIN: CPT | Performed by: INTERNAL MEDICINE

## 2019-01-31 ENCOUNTER — APPOINTMENT (OUTPATIENT)
Dept: LAB | Facility: HOSPITAL | Age: 69
End: 2019-01-31
Attending: INTERNAL MEDICINE

## 2019-01-31 PROCEDURE — 81596 NFCT DS CHRNC HCV 6 ASSAYS: CPT | Performed by: INTERNAL MEDICINE

## 2019-01-31 PROCEDURE — 36415 COLL VENOUS BLD VENIPUNCTURE: CPT | Performed by: INTERNAL MEDICINE

## 2019-02-02 LAB
A2 MACROGLOB SERPL-MCNC: 344 MG/DL (ref 110–276)
ALT SERPL W P-5'-P-CCNC: 31 IU/L (ref 0–40)
APO A-I SERPL-MCNC: 197 MG/DL (ref 116–209)
BILIRUB SERPL-MCNC: 0.5 MG/DL (ref 0–1.2)
FIBROSIS SCORING:: ABNORMAL
FIBROSIS STAGE SERPL QL: ABNORMAL
GGT SERPL-CCNC: 85 IU/L (ref 0–60)
HAPTOGLOB SERPL-MCNC: 41 MG/DL (ref 34–200)
HCV AB SER QL: ABNORMAL
LABORATORY COMMENT REPORT: ABNORMAL
LIMITATIONS:: ABNORMAL
LIVER FIBR SCORE SERPL CALC.FIBROSURE: 0.64 (ref 0–0.21)
NECROINFLAMM ACTIVITY SCORING:: ABNORMAL
NECROINFLAMMATORY ACT GRADE SERPL QL: ABNORMAL
NECROINFLAMMATORY ACT SCORE SERPL: 0.23 (ref 0–0.17)

## 2019-03-14 ENCOUNTER — TRANSCRIBE ORDERS (OUTPATIENT)
Dept: MAMMOGRAPHY | Facility: HOSPITAL | Age: 69
End: 2019-03-14

## 2019-03-14 DIAGNOSIS — Z12.39 BREAST CANCER SCREENING: Primary | ICD-10-CM

## 2019-04-02 ENCOUNTER — OFFICE VISIT (OUTPATIENT)
Dept: INTERNAL MEDICINE | Facility: CLINIC | Age: 69
End: 2019-04-02

## 2019-04-02 ENCOUNTER — OFFICE VISIT (OUTPATIENT)
Dept: GASTROENTEROLOGY | Facility: CLINIC | Age: 69
End: 2019-04-02

## 2019-04-02 VITALS
BODY MASS INDEX: 21.16 KG/M2 | DIASTOLIC BLOOD PRESSURE: 64 MMHG | RESPIRATION RATE: 18 BRPM | HEIGHT: 62 IN | TEMPERATURE: 98.4 F | WEIGHT: 115 LBS | SYSTOLIC BLOOD PRESSURE: 126 MMHG | HEART RATE: 107 BPM

## 2019-04-02 VITALS
TEMPERATURE: 97.3 F | WEIGHT: 113 LBS | BODY MASS INDEX: 20.8 KG/M2 | OXYGEN SATURATION: 100 % | DIASTOLIC BLOOD PRESSURE: 57 MMHG | HEIGHT: 62 IN | HEART RATE: 67 BPM | SYSTOLIC BLOOD PRESSURE: 116 MMHG

## 2019-04-02 DIAGNOSIS — B18.2 CHRONIC HEPATITIS C WITHOUT HEPATIC COMA (HCC): ICD-10-CM

## 2019-04-02 DIAGNOSIS — E55.9 VITAMIN D DEFICIENCY: ICD-10-CM

## 2019-04-02 DIAGNOSIS — M25.562 CHRONIC PAIN OF LEFT KNEE: ICD-10-CM

## 2019-04-02 DIAGNOSIS — L65.9 HAIR LOSS: Primary | ICD-10-CM

## 2019-04-02 DIAGNOSIS — I10 ESSENTIAL HYPERTENSION: ICD-10-CM

## 2019-04-02 DIAGNOSIS — R79.89 LOW VITAMIN D LEVEL: ICD-10-CM

## 2019-04-02 DIAGNOSIS — G89.29 CHRONIC PAIN OF LEFT KNEE: ICD-10-CM

## 2019-04-02 DIAGNOSIS — K76.9 CHRONIC LIVER DISEASE: Primary | ICD-10-CM

## 2019-04-02 LAB
25(OH)D3+25(OH)D2 SERPL-MCNC: 63.3 NG/ML
TSH SERPL DL<=0.005 MIU/L-ACNC: 0.48 MIU/ML (ref 0.47–4.68)
VIT B12 SERPL-MCNC: 375 PG/ML (ref 239–931)

## 2019-04-02 PROCEDURE — 99214 OFFICE O/P EST MOD 30 MIN: CPT | Performed by: INTERNAL MEDICINE

## 2019-04-02 RX ORDER — LISINOPRIL 20 MG/1
20 TABLET ORAL DAILY
Qty: 30 TABLET | Refills: 5 | Status: SHIPPED | OUTPATIENT
Start: 2019-04-02 | End: 2019-09-18 | Stop reason: SDUPTHER

## 2019-04-02 NOTE — PROGRESS NOTES
"Chief Complaint   Patient presents with   • Hypertension     follow up; patient has been Lisinopril for several years; she has started to experience hair loss which is a possible side effect of Lisinopril       Subjective     History of Present Illness   Mya Hawkins is a 68 y.o. female presenting for follow up.  Patient shares that she feels \"blessed\" as usual.  She  does share some concerns about hair loss over the last several years.  She notices that she has been having generalized thinning.  She heard from a friend that lisinopril may cause hair loss.  She is curious whether she should change medications.  She is also wondering about new aspirin guidelines and whether she should continue taking her aspirin.  Blood pressure has been well controlled with current medications.  Chronic pain is controlled with pain management.    The following portions of the patient's history were reviewed and updated as appropriate: allergies, current medications, past family history, past medical history, past social history, past surgical history and problem list.    Review of Systems   Constitutional: Negative for chills, fatigue and fever.   HENT: Negative for congestion, ear pain, rhinorrhea, sinus pressure and sore throat.    Eyes: Negative for visual disturbance.   Respiratory: Negative for cough, chest tightness, shortness of breath and wheezing.    Cardiovascular: Negative for chest pain, palpitations and leg swelling.   Gastrointestinal: Negative for abdominal pain, blood in stool, constipation, diarrhea, nausea and vomiting.   Endocrine: Negative for polydipsia and polyuria.   Genitourinary: Negative for dysuria and hematuria.   Musculoskeletal: Negative for arthralgias and back pain.   Skin: Negative for rash.   Neurological: Negative for dizziness, light-headedness, numbness and headaches.   Psychiatric/Behavioral: Negative for dysphoric mood and sleep disturbance. The patient is not nervous/anxious.  " "      No Known Allergies    Past Medical History:   Diagnosis Date   • Body piercing     EARS   • Hay fever     REPORTS \"WHEN I LIVED IN [place]\"   • Hepatitis C    • History of bronchitis    • History of transfusion     REPORTS SHE THINKS SHE HAS HAD A TRANSFUSION IN THE PAST BUT IS NOT CERTAIN. DID REPORTS IF SHE HAD A TRANSFUSION THAT THER IS NO KNOWN REACTION   • Hypertension    • Seasonal allergies    • Wears glasses     RX   • Wears partial dentures     REPORTS SHE WEARS A PARTIAL IN LOWER MOUTH - INSTRUCTED NO ADHESIVES THE DOS       Social History     Socioeconomic History   • Marital status:      Spouse name: Not on file   • Number of children: Not on file   • Years of education: Not on file   • Highest education level: Not on file   Tobacco Use   • Smoking status: Current Every Day Smoker     Packs/day: 0.50     Years: 50.00     Pack years: 25.00     Types: Cigarettes   • Smokeless tobacco: Never Used   • Tobacco comment: REPORTS SHE SMOKES \"OFF AND ON SINCE AGE 18\"   Substance and Sexual Activity   • Alcohol use: Yes     Comment: SOCIAL USE, REPORTS NO HX OF ETOH ABUSE   • Drug use: No   • Sexual activity: Defer        Past Surgical History:   Procedure Laterality Date   • CATARACT EXTRACTION Left    • COLONOSCOPY     • COLONOSCOPY N/A 8/16/2018    Procedure: COLONOSCOPY hot snare polypectomy x2, saline injection, cold biopsies, tattoo injection, resolution clip x3;  Surgeon: Sen Morley MD;  Location: [facility] ENDOSCOPY;  Service: Gastroenterology   • JOINT REPLACEMENT Left     KNEE REPLACEMENT, THEN LATER HAD REVISION OF LEFT KNEE   • TONSILLECTOMY     • TUBAL ABDOMINAL LIGATION         Family History   Problem Relation Age of Onset   • Diabetes Mother    • Heart attack Mother    • Stroke Mother    • Diabetes Father    • Heart attack Father    • Stroke Father    • Hyperlipidemia Father    • Hypertension Father    • Colon polyps Brother    • Diabetes Brother  " "  • Cancer Paternal Aunt         stomach   • Diabetes Sister          Current Outpatient Medications:   •  aspirin 81 MG EC tablet, Take 1 tablet by mouth Daily., Disp: 30 tablet, Rfl: 5  •  cetirizine (zyrTEC) 10 MG tablet, Take 10 mg by mouth Daily As Needed., Disp: , Rfl:   •  HYDROcodone-acetaminophen (NORCO)  MG per tablet, Norco 10 mg-325 mg tablet  1 PO TID-QID PRN PAIN TO LAST FOR 30 DAYS, Disp: , Rfl:   •  lisinopril (PRINIVIL,ZESTRIL) 20 MG tablet, Take 1 tablet by mouth Daily., Disp: 30 tablet, Rfl: 5    Objective   /57   Pulse 67   Temp 97.3 °F (36.3 °C)   Ht 157.5 cm (62\")   Wt 51.3 kg (113 lb)   LMP  (LMP Unknown)   SpO2 100%   BMI 20.67 kg/m²     Physical Exam   Constitutional: She is oriented to person, place, and time. She appears well-developed and well-nourished.   HENT:   Head: Normocephalic and atraumatic.   Generalized thinning of hair noted.   Eyes: Conjunctivae are normal.   Neck: Normal range of motion. Neck supple.   Pulmonary/Chest: Effort normal.   Musculoskeletal: Normal range of motion.   Neurological: She is alert and oriented to person, place, and time.   Skin: No rash noted.   Psychiatric: She has a normal mood and affect. Her behavior is normal.   Nursing note and vitals reviewed.      Assessment/Plan   Mya was seen today for hypertension.    Diagnoses and all orders for this visit:    Hair loss  -     Vitamin D 25 Hydroxy  -     Vitamin B12  -     TSH    Low vitamin D level  -     Vitamin D 25 Hydroxy    Vitamin D deficiency   -     Vitamin D 25 Hydroxy    Essential hypertension  -     lisinopril (PRINIVIL,ZESTRIL) 20 MG tablet; Take 1 tablet by mouth Daily.    Chronic pain of left knee          Discussion Summary:  Patient is a 68 y.o. female presenting for follow up.    1.  Hair loss  -Check labs noted above.    2.  Essential hypertension  -Stable on lisinopril.  No need to change medications at this time.  Patient was reassured that this is likely not the " cause of her hair loss.    3.  Tobacco abuse  - Patient continues to smoke 1 pack/day.  She is not interested in medical options for helping her quit at this time.  She was encouraged to quit and reduce smoking.    4.  Hepatitis C  -Patient is following with gastroenterology.    5.  Chronic pain of left knee  - Stable on current medications per pain management.    Follow up:  No Follow-up on file.

## 2019-04-02 NOTE — PROGRESS NOTES
"Chief Complaint   Patient presents with   • Hepatic Disease     History of Present Illness     The patient has history of chronic hepatitis C diagnosed about 2 years ago. The patient had blood transfusion in 1980s. There is no history of drug use. The patient denies history of tattoos. Over the years the patient has history of significant alcohol use. She started drinking at age 18. The patient used to drink 4-5 drinks per day usually 4-5 times a week. She quit alcohol in 2008. Since then the patient has switched to \"nonalcoholic beer\". The patient was drinking 4-5 cans of nonalcoholic beer daily. She has finally quit nonalcoholic beer as well in December 2018. The patient has received first dose of vaccination against hepatitis A in October 2018. It appears that the patient had been exposed to hepatitis B in the past, got rid of it and is currently immune to it. She denies jaundice. There is no darkening of urine color, lightning of stool color or pruritus. Her hepatitis C genotype is 1b.  The patient has also been positive for DAE.     The patient denies further diarrheal symptoms. She is been having one and occasionally 2 formed stools a day. There is no nausea or vomiting. She denies reflux. There is no dysphagia or odynophagia.There is no history of overt GI bleed (Hematemesis, melena or hematochezia). The patient denies recent weight loss.     There is no history of pancreatitis. There is no family history of colon cancer, inflammatory bowel disease or chronic liver disease.     Review of Systems   Constitutional: Positive for fatigue. Negative for appetite change, chills, fever and unexpected weight change.   HENT: Negative for mouth sores, nosebleeds and trouble swallowing.    Eyes: Negative for discharge and redness.   Respiratory: Negative for apnea, cough and shortness of breath.    Cardiovascular: Negative for chest pain, palpitations and leg swelling.   Gastrointestinal: Negative for abdominal " "distention, abdominal pain, anal bleeding, blood in stool, constipation, diarrhea, nausea and vomiting.   Endocrine: Negative for cold intolerance, heat intolerance and polydipsia.   Genitourinary: Negative for dysuria, hematuria and urgency.   Musculoskeletal: Negative for arthralgias, joint swelling and myalgias.   Skin: Negative for rash.   Allergic/Immunologic: Negative for food allergies and immunocompromised state.   Neurological: Negative for dizziness, seizures, syncope and headaches.   Hematological: Negative for adenopathy. Does not bruise/bleed easily.   Psychiatric/Behavioral: Negative for dysphoric mood. The patient is not nervous/anxious and is not hyperactive.      Patient Active Problem List   Diagnosis   • Essential hypertension   • Chronic neck pain   • Allergic rhinitis due to allergen   • Diarrhea   • Colon cancer screening   • Chronic pain of left knee     Past Medical History:   Diagnosis Date   • Body piercing     EARS   • Hay fever     REPORTS \"WHEN I LIVED IN [place]\"   • Hepatitis C    • History of bronchitis    • History of transfusion     REPORTS SHE THINKS SHE HAS HAD A TRANSFUSION IN THE PAST BUT IS NOT CERTAIN. DID REPORTS IF SHE HAD A TRANSFUSION THAT THER IS NO KNOWN REACTION   • Hypertension    • Seasonal allergies    • Wears glasses     RX   • Wears partial dentures     REPORTS SHE WEARS A PARTIAL IN LOWER MOUTH - INSTRUCTED NO ADHESIVES THE DOS     Past Surgical History:   Procedure Laterality Date   • CATARACT EXTRACTION Left    • COLONOSCOPY     • COLONOSCOPY N/A 8/16/2018    Procedure: COLONOSCOPY hot snare polypectomy x2, saline injection, cold biopsies, tattoo injection, resolution clip x3;  Surgeon: Sen Morley MD;  Location: [facility] ENDOSCOPY;  Service: Gastroenterology   • JOINT REPLACEMENT Left     KNEE REPLACEMENT, THEN LATER HAD REVISION OF LEFT KNEE   • TONSILLECTOMY     • TUBAL ABDOMINAL LIGATION       Family History   Problem " "Relation Age of Onset   • Diabetes Mother    • Heart attack Mother    • Stroke Mother    • Diabetes Father    • Heart attack Father    • Stroke Father    • Hyperlipidemia Father    • Hypertension Father    • Colon polyps Brother    • Diabetes Brother    • Cancer Paternal Aunt         stomach   • Diabetes Sister      Social History     Tobacco Use   • Smoking status: Current Every Day Smoker     Packs/day: 0.50     Years: 50.00     Pack years: 25.00     Types: Cigarettes   • Smokeless tobacco: Never Used   • Tobacco comment: REPORTS SHE SMOKES \"OFF AND ON SINCE AGE 18\"   Substance Use Topics   • Alcohol use: Yes     Comment: SOCIAL USE, REPORTS NO HX OF ETOH ABUSE       Current Outpatient Medications:   •  aspirin 81 MG EC tablet, Take 1 tablet by mouth Daily., Disp: 30 tablet, Rfl: 5  •  cetirizine (zyrTEC) 10 MG tablet, Take 10 mg by mouth Daily As Needed., Disp: , Rfl:   •  HYDROcodone-acetaminophen (NORCO)  MG per tablet, Norco 10 mg-325 mg tablet  1 PO TID-QID PRN PAIN TO LAST FOR 30 DAYS, Disp: , Rfl:   •  lisinopril (PRINIVIL,ZESTRIL) 20 MG tablet, Take 1 tablet by mouth Daily., Disp: 30 tablet, Rfl: 5    No Known Allergies    Blood pressure 126/64, pulse 107, temperature 98.4 °F (36.9 °C), resp. rate 18, height 157.5 cm (62\"), weight 52.2 kg (115 lb), not currently breastfeeding.    Physical Exam   Constitutional: She is oriented to person, place, and time. She appears well-developed and well-nourished. No distress.   HENT:   Head: Normocephalic and atraumatic.   Right Ear: Hearing and external ear normal.   Left Ear: Hearing and external ear normal.   Nose: Nose normal.   Mouth/Throat: Oropharynx is clear and moist and mucous membranes are normal. Mucous membranes are not pale, not dry and not cyanotic. No oral lesions. No oropharyngeal exudate.   Eyes: Conjunctivae and EOM are normal. Right eye exhibits no discharge. Left eye exhibits no discharge. No scleral icterus.   Neck: Trachea normal. Neck " supple. No JVD present. No edema present. No thyroid mass and no thyromegaly present.   Cardiovascular: Normal rate, regular rhythm, S2 normal and normal heart sounds. Exam reveals no gallop, no S3 and no friction rub.   No murmur heard.  Pulmonary/Chest: Effort normal and breath sounds normal. No respiratory distress. She has no wheezes. She has no rales. She exhibits no tenderness.   Abdominal: Soft. Normal appearance and bowel sounds are normal. She exhibits no distension, no ascites and no mass. There is no splenomegaly or hepatomegaly. There is no tenderness. There is no rigidity, no rebound and no guarding. No hernia.   Musculoskeletal: She exhibits no tenderness or deformity.     Vascular Status -  Her right foot exhibits no edema. Her left foot exhibits no edema.  Lymphadenopathy:     She has no cervical adenopathy.        Left: No supraclavicular adenopathy present.   Neurological: She is alert and oriented to person, place, and time. She has normal strength. No cranial nerve deficit or sensory deficit. She exhibits normal muscle tone. Coordination normal.   Skin: No rash noted. She is not diaphoretic. No cyanosis. No pallor. Nails show no clubbing.   Psychiatric: She has a normal mood and affect. Her behavior is normal. Judgment and thought content normal.   Nursing note and vitals reviewed.  Stigmata of chronic liver disease: Positive palmar erythema  Asterixis:  None.    Laboratory Testing:  Upon review of medical records:    Dated July 13, 2018 sodium 140 potassium 4.2 chloride 105 CO2 27 BUN 11 serum creatinine 0.80 glucose 88.  Calcium 9.8.  Total protein 8.1.  Albumin 4.10.  T bili 0.8 AST 41 ALT 33 alkaline phosphatase 75.  Total cholesterol 142.  Triglycerides 67.  Hepatitis A IgM negative.  Hepatitis B surface antigen negative.  Hepatitis B core IgM negative.  Hepatitis C virus antibody positive at >11.0.     Dated July 18, 2018 HCV by PCR quantitative 2,700,000 IU/mL.  6.431 log 10 IU/mL.       Dated September 27, 2018 sodium 142 potassium 4.1 chloride 107 CO2 28 BUN 11 serum creatinine 0.80 glucose 89.  Calcium 9.9.  Total protein 9.0.  Albumin 4.70.  T bili 0.4 AST 41 ALT 48 alkaline phosphatase 102.  Serum iron 87.  TIBC 375.  Iron saturation 23%.  Ferritin 339.00.  Alpha-1 antitrypsin level 185.  Phenotype MM.  DAE direct positive.  Smooth muscle antibody negative at 16.  Mitochondrial antibody negative at <20.0.  Hepatitis A IgM negative.  Hepatitis A total antibody negative.  Hepatitis B core IgM negative.  Hepatitis B core total antibody positive.  Hepatitis B surface antibody reactive.  Hepatitis B surface antigen negative.    Dated January 31, 2019 HCV Fibrosure: Fibrosis score 0.64 (elevated).  Fibrosis stage F3-bridging fibrosis with many septa.  Necroinflammatory activity score elevated at 0.23, activity grade A0 to A1.  Alpha-2-macroglobulin quantitative elevated at 344.  Haptoglobin 41.  GGT elevated at 85.Apolipoprotein A-1 level at 197.     Procedures:  Upon review of medical records:     Dated August 16, 2018 the patient underwent a colonoscopy to the terminal ileum which revealed: Scant diverticulosis.  Colon polyps.  2 were removed 10-12 mm in size.  Internal hemorrhoids.  No endoscopic evidence of colitis was seen.  Random biopsies were obtained from the colon upon withdrawal of scope. Transverse colon polyp, hot snare, biopsy revealed tubular adenoma.  Negative for high-grade dysplasia or malignancy.  Ascending colon, proximal polypoid area and cecal junction, biopsy revealed benign colonic mucosa with an intramucosal lymphoid aggregate.  Negative for dysplasia or malignancy.  Hepatic flexure polyp, hot snare, biopsy revealed fragments of tubular adenoma.  Negative for high-grade dysplasia or malignancy. Random colon, biopsies revealed benign colonic mucosa with no diagnostic abdomen abnormalities.  Negative for chronic or active colitis including lymphocytic or collagen is colitis.   "Negative for dysplasia or malignancy.    Assessment:      ICD-10-CM ICD-9-CM   1. Chronic liver disease K76.9 571.9   2. Chronic hepatitis C without hepatic coma (CMS/HCC) B18.2 070.54         Discussion:  1. HCV fibro-sure score in January 2019 was noted to be 0.64-  followup colon exam in 3 years.  Consistent with \"bridging fibrosis with many septa\".    Plan/  Patient Instructions   1. Low-fat diet.  2. Avoid alcohol.  3. Check CBC, PT PTT.  4. The patient has been advised to stop aspirin and NSAIDs for 1 week prior to the liver biopsy.  5. Liver biopsy by radiology.  Risks benefits and alternatives were discussed with the patient.  6. Follow-up in 3-4 weeks.  7. Further plans to treat chronic hep C in the future.  8. Discussed with the patient in detail. Opportunity was given to ask questions.         Sen Morley MD    "

## 2019-04-02 NOTE — PATIENT INSTRUCTIONS
Alopecia Areata, Adult  Alopecia areata is a condition that causes you to lose hair. You may lose hair on your scalp in patches. In some cases, you may lose all the hair on your scalp (alopecia totalis) or all the hair from your face and body (alopecia universalis).  Alopecia areata is an autoimmune disease. This means that your body's defense system (immune system) mistakes normal parts of the body for germs or other things that can make you sick. When you have alopecia areata, the immune system attacks the hair follicles.  Alopecia areata usually develops in childhood, but it can develop at any age. For some people, their hair grows back on its own and hair loss does not happen again. For others, their hair may fall out and grow back in cycles. The hair loss may last many years. Having this condition can be emotionally difficult, but it is not dangerous.  What are the causes?  The cause of this condition is not known.  What increases the risk?  This condition is more likely to develop in people who have:  · A family history of alopecia.  · A family history of another autoimmune disease, including type 1 diabetes and rheumatoid arthritis.  · Asthma and allergies.  · Down syndrome.    What are the signs or symptoms?  Round spots of patchy hair loss on the scalp is the main symptom of this condition. The spots may be mildly itchy. Other symptoms include:  · Short dark hairs in the bald patches that are wider at the top (exclamation point hairs).  · Dents, white spots, or lines in the fingernails or toenails.  · Balding and body hair loss. This is rare.    How is this diagnosed?  This condition is diagnosed based on your symptoms and family history. Your health care provider will also check your scalp skin, teeth, and nails. Your health care provider may refer you to a specialist in hair and skin disorders (dermatologist). You may also have tests, including:  · A hair pull test.  · Blood tests or other screening tests  to check for autoimmune diseases, such as thyroid disease or diabetes.  · Skin biopsy to confirm the diagnosis.  · A procedure to examine the skin with a lighted magnifying instrument (dermoscopy).    How is this treated?  There is no cure for alopecia areata. Treatment is aimed at promoting the regrowth of hair and preventing the immune system from overreacting. No single treatment is right for all people with alopecia areata. It depends on the type of hair loss you have and how severe it is. Work with your health care provider to find the best treatment for you. Treatment may include:  · Having regular checkups to make sure the condition is not getting worse (watchful waiting).  · Steroid creams or pills for 6-8 weeks to stop the immune reaction and help hair to regrow more quickly.  · Other topical medicines to alter the immune system response and support the hair growth cycle.  · Steroid injections.  · Therapy and counseling with a support group or therapist if you are having trouble coping with hair loss.    Follow these instructions at home:  · Learn as much as you can about your condition.  · Apply topical creams only as told by your health care provider.  · Take over-the-counter and prescription medicines only as told by your health care provider.  · Consider getting a wig or products to make hair look altamirano or to cover bald spots, if you feel uncomfortable with your appearance.  · Get therapy or counseling if you are having a hard time coping with hair loss. Ask your health care provider to recommend a counselor or support group.  · Keep all follow-up visits as told by your health care provider. This is important.  Contact a health care provider if:  · Your hair loss gets worse, even with treatment.  · You have new symptoms.  · You are struggling emotionally.  Summary  · Alopecia areata is an autoimmune condition that makes your body's defense system (immune system) attack the hair follicles. This causes  you to lose hair.  · Treatments may include regular checkups to make sure that the condition is not getting worse (watchful waiting), medicines, and steroid injections.  This information is not intended to replace advice given to you by your health care provider. Make sure you discuss any questions you have with your health care provider.  Document Released: 07/22/2005 Document Revised: 01/05/2018 Document Reviewed: 01/05/2018  [employer] Patient Education © 2019 [employer]

## 2019-04-02 NOTE — PATIENT INSTRUCTIONS
1. Low-fat diet.  2. Avoid alcohol.  3. Check CBC, PT PTT.  4. The patient has been advised to stop aspirin and NSAIDs for 1 week prior to the liver biopsy.  5. Liver biopsy by radiology.  Risks benefits and alternatives were discussed with the patient.  6. Follow-up in 3-4 weeks.  7. Further plans to treat chronic hep C in the future.  8. Discussed with the patient in detail. Opportunity was given to ask questions.

## 2019-04-03 NOTE — PROGRESS NOTES
Let the patient know her B12 is slightly on the lower side.  She can take supplements over the counter 500mcg daily.  I do not think lisinopril is causing her hairloss.  It is likely age related.

## 2019-04-15 ENCOUNTER — HOSPITAL ENCOUNTER (OUTPATIENT)
Dept: CT IMAGING | Facility: HOSPITAL | Age: 69
Discharge: HOME | End: 2019-04-15
Admitting: RADIOLOGY

## 2019-04-15 VITALS
BODY MASS INDEX: 21.9 KG/M2 | OXYGEN SATURATION: 100 % | HEIGHT: 62 IN | TEMPERATURE: 97.5 F | WEIGHT: 119 LBS | HEART RATE: 51 BPM | RESPIRATION RATE: 18 BRPM | SYSTOLIC BLOOD PRESSURE: 143 MMHG | DIASTOLIC BLOOD PRESSURE: 68 MMHG

## 2019-04-15 DIAGNOSIS — B18.2 CHRONIC HEPATITIS C WITHOUT HEPATIC COMA (HCC): ICD-10-CM

## 2019-04-15 LAB
APTT PPP: 33.2 SECONDS (ref 24.5–37.2)
DEPRECATED RDW RBC AUTO: 41.1 FL (ref 37–54)
ERYTHROCYTE [DISTWIDTH] IN BLOOD BY AUTOMATED COUNT: 12.6 % (ref 12.3–15.4)
HCT VFR BLD AUTO: 45.9 % (ref 34–46.6)
HGB BLD-MCNC: 15.4 G/DL (ref 12–15.9)
INR PPP: 0.98 (ref 0.9–1.1)
MCH RBC QN AUTO: 29.6 PG (ref 26.6–33)
MCHC RBC AUTO-ENTMCNC: 33.6 G/DL (ref 31.5–35.7)
MCV RBC AUTO: 88.3 FL (ref 79–97)
PLATELET # BLD AUTO: 211 10*3/MM3 (ref 140–450)
PMV BLD AUTO: 9.9 FL (ref 6–12)
PROTHROMBIN TIME: 13.3 SECONDS (ref 12–15.1)
RBC # BLD AUTO: 5.2 10*6/MM3 (ref 3.77–5.28)
WBC NRBC COR # BLD: 6.69 10*3/MM3 (ref 3.4–10.8)

## 2019-04-15 PROCEDURE — 77012 CT SCAN FOR NEEDLE BIOPSY: CPT

## 2019-04-15 PROCEDURE — 85730 THROMBOPLASTIN TIME PARTIAL: CPT | Performed by: RADIOLOGY

## 2019-04-15 PROCEDURE — 25010000002 MIDAZOLAM PER 1 MG: Performed by: RADIOLOGY

## 2019-04-15 PROCEDURE — 85027 COMPLETE CBC AUTOMATED: CPT | Performed by: RADIOLOGY

## 2019-04-15 PROCEDURE — 25010000002 FENTANYL CITRATE (PF) 100 MCG/2ML SOLUTION: Performed by: RADIOLOGY

## 2019-04-15 PROCEDURE — 85610 PROTHROMBIN TIME: CPT | Performed by: RADIOLOGY

## 2019-04-15 RX ORDER — SODIUM CHLORIDE 0.9 % (FLUSH) 0.9 %
5 SYRINGE (ML) INJECTION AS NEEDED
Status: DISCONTINUED | OUTPATIENT
Start: 2019-04-15 | End: 2019-04-16 | Stop reason: HOSPADM

## 2019-04-15 RX ORDER — SODIUM CHLORIDE 0.9 % (FLUSH) 0.9 %
3 SYRINGE (ML) INJECTION EVERY 12 HOURS SCHEDULED
Status: DISCONTINUED | OUTPATIENT
Start: 2019-04-15 | End: 2019-04-16 | Stop reason: HOSPADM

## 2019-04-15 RX ORDER — MIDAZOLAM HYDROCHLORIDE 1 MG/ML
INJECTION INTRAMUSCULAR; INTRAVENOUS
Status: COMPLETED | OUTPATIENT
Start: 2019-04-15 | End: 2019-04-15

## 2019-04-15 RX ORDER — FENTANYL CITRATE 50 UG/ML
INJECTION, SOLUTION INTRAMUSCULAR; INTRAVENOUS
Status: COMPLETED | OUTPATIENT
Start: 2019-04-15 | End: 2019-04-15

## 2019-04-15 RX ORDER — HYDROCODONE BITARTRATE AND ACETAMINOPHEN 5; 325 MG/1; MG/1
2 TABLET ORAL EVERY 4 HOURS PRN
Status: DISCONTINUED | OUTPATIENT
Start: 2019-04-15 | End: 2019-04-16 | Stop reason: HOSPADM

## 2019-04-15 RX ADMIN — FENTANYL CITRATE 25 MCG: 50 INJECTION, SOLUTION INTRAMUSCULAR; INTRAVENOUS at 10:55

## 2019-04-15 RX ADMIN — MIDAZOLAM HYDROCHLORIDE 1 MG: 1 INJECTION, SOLUTION INTRAMUSCULAR; INTRAVENOUS at 11:03

## 2019-04-15 RX ADMIN — FENTANYL CITRATE 25 MCG: 50 INJECTION, SOLUTION INTRAMUSCULAR; INTRAVENOUS at 10:48

## 2019-04-26 ENCOUNTER — APPOINTMENT (OUTPATIENT)
Dept: MAMMOGRAPHY | Facility: HOSPITAL | Age: 69
End: 2019-04-26

## 2019-04-29 ENCOUNTER — OFFICE VISIT (OUTPATIENT)
Dept: GASTROENTEROLOGY | Facility: CLINIC | Age: 69
End: 2019-04-29

## 2019-04-29 VITALS
HEIGHT: 62 IN | RESPIRATION RATE: 16 BRPM | SYSTOLIC BLOOD PRESSURE: 136 MMHG | DIASTOLIC BLOOD PRESSURE: 76 MMHG | TEMPERATURE: 97.8 F | WEIGHT: 114 LBS | HEART RATE: 60 BPM | BODY MASS INDEX: 20.98 KG/M2

## 2019-04-29 DIAGNOSIS — K76.9 CHRONIC LIVER DISEASE: Primary | ICD-10-CM

## 2019-04-29 DIAGNOSIS — B18.2 CHRONIC HEPATITIS C WITHOUT HEPATIC COMA (HCC): ICD-10-CM

## 2019-04-29 DIAGNOSIS — D12.3 ADENOMATOUS POLYP OF TRANSVERSE COLON: ICD-10-CM

## 2019-04-29 PROCEDURE — 99214 OFFICE O/P EST MOD 30 MIN: CPT | Performed by: INTERNAL MEDICINE

## 2019-04-29 NOTE — PROGRESS NOTES
"Chief Complaint   Patient presents with   • Hepatic Disease     History of Present Illness     The patient has history of chronic hepatitis C diagnosed about 2 years ago. The patient had blood transfusion in 1980s. There is no history of drug use. The patient denies history of tattoos. Over the years the patient has history of significant alcohol use. She started drinking at age 18. The patient used to drink 4-5 drinks per day usually 4-5 times a week. She quit alcohol in 2008. Since then the patient has switched to \"nonalcoholic beer\". The patient was drinking 4-5 cans of nonalcoholic beer daily. She has finally quit nonalcoholic beer as well in December 2018. The patient has received first dose of vaccination against hepatitis A in October 2018. It appears that the patient had been exposed to hepatitis B in the past, got rid of it and is currently immune to it. She denies jaundice. There is no darkening of urine color, lightning of stool color or pruritus. Her hepatitis C genotype is 1b.  The patient has also been positive for DAE.     The patient denies further diarrheal symptoms. She is been having one and occasionally 2 formed stools a day. There is no nausea or vomiting. She denies reflux. There is no dysphagia or odynophagia.There is no history of overt GI bleed (Hematemesis, melena or hematochezia). The patient denies recent weight loss.     There is no history of pancreatitis. There is no family history of colon cancer, inflammatory bowel disease or chronic liver disease.  Patient had undergone liver biopsy which was on eventful.      Review of Systems   Constitutional: Positive for fatigue. Negative for appetite change, chills, fever and unexpected weight change.   HENT: Negative for mouth sores, nosebleeds and trouble swallowing.    Eyes: Negative for discharge and redness.   Respiratory: Negative for apnea, cough and shortness of breath.    Cardiovascular: Negative for chest pain, palpitations and leg " "swelling.   Gastrointestinal: Negative for abdominal distention, abdominal pain, anal bleeding, blood in stool, constipation, diarrhea, nausea and vomiting.   Endocrine: Negative for cold intolerance, heat intolerance and polydipsia.   Genitourinary: Negative for dysuria, hematuria and urgency.   Musculoskeletal: Positive for arthralgias. Negative for joint swelling and myalgias.   Skin: Negative for rash.   Allergic/Immunologic: Negative for food allergies and immunocompromised state.   Neurological: Negative for dizziness, seizures, syncope and headaches.   Hematological: Negative for adenopathy. Does not bruise/bleed easily.   Psychiatric/Behavioral: Negative for dysphoric mood. The patient is not nervous/anxious and is not hyperactive.      Patient Active Problem List   Diagnosis   • Essential hypertension   • Chronic neck pain   • Allergic rhinitis due to allergen   • Diarrhea   • Colon cancer screening   • Chronic pain of left knee     Past Medical History:   Diagnosis Date   • Body piercing     EARS   • Hay fever     REPORTS \"WHEN I LIVED IN [place] IN THE [place] AND [place]\"   • Hepatitis C    • Hepatitis C    • Hepatitis C    • History of bronchitis    • History of transfusion     REPORTS SHE THINKS SHE HAS HAD A TRANSFUSION IN THE PAST BUT IS NOT CERTAIN. DID REPORTS IF SHE HAD A TRANSFUSION THAT THER IS NO KNOWN REACTION   • Hypertension    • Seasonal allergies    • Wears glasses     RX   • Wears partial dentures     REPORTS SHE WEARS A PARTIAL IN LOWER MOUTH - INSTRUCTED NO ADHESIVES THE DOS     Past Surgical History:   Procedure Laterality Date   • CATARACT EXTRACTION Left    • COLONOSCOPY     • COLONOSCOPY N/A 8/16/2018    Procedure: COLONOSCOPY hot snare polypectomy x2, saline injection, cold biopsies, tattoo injection, resolution clip x3;  Surgeon: Sen Morley MD;  Location: [facility];  Service: Gastroenterology   • JOINT REPLACEMENT Left     KNEE REPLACEMENT, THEN LATER HAD " "REVISION OF LEFT KNEE   • TONSILLECTOMY     • TUBAL ABDOMINAL LIGATION       Family History   Problem Relation Age of Onset   • Diabetes Mother    • Heart attack Mother    • Stroke Mother    • Diabetes Father    • Heart attack Father    • Stroke Father    • Hyperlipidemia Father    • Hypertension Father    • Colon polyps Brother    • Diabetes Brother    • Cancer Paternal Aunt         stomach   • Diabetes Sister      Social History     Tobacco Use   • Smoking status: Current Every Day Smoker     Packs/day: 0.50     Years: 50.00     Pack years: 25.00     Types: Cigarettes   • Smokeless tobacco: Never Used   • Tobacco comment: REPORTS SHE SMOKES \"OFF AND ON SINCE AGE 18\"   Substance Use Topics   • Alcohol use: Yes     Comment: SOCIAL USE, REPORTS NO HX OF ETOH ABUSE       Current Outpatient Medications:   •  aspirin 81 MG EC tablet, Take 1 tablet by mouth Daily., Disp: 30 tablet, Rfl: 5  •  cetirizine (zyrTEC) 10 MG tablet, Take 10 mg by mouth Daily As Needed., Disp: , Rfl:   •  HYDROcodone-acetaminophen (NORCO)  MG per tablet, Norco 10 mg-325 mg tablet  1 PO TID-QID PRN PAIN TO LAST FOR 30 DAYS, Disp: , Rfl:   •  lisinopril (PRINIVIL,ZESTRIL) 20 MG tablet, Take 1 tablet by mouth Daily., Disp: 30 tablet, Rfl: 5    No Known Allergies    Blood pressure 136/76, pulse 60, temperature 97.8 °F (36.6 °C), resp. rate 16, height 157.5 cm (62\"), weight 51.7 kg (114 lb), not currently breastfeeding.    Physical Exam   Constitutional: She is oriented to person, place, and time. She appears well-developed and well-nourished. No distress.   HENT:   Head: Normocephalic and atraumatic.   Right Ear: Hearing and external ear normal.   Left Ear: Hearing and external ear normal.   Nose: Nose normal.   Mouth/Throat: Oropharynx is clear and moist and mucous membranes are normal. Mucous membranes are not pale, not dry and not cyanotic. No oral lesions. No oropharyngeal exudate.   Eyes: Conjunctivae and EOM are normal. Right eye " exhibits no discharge. Left eye exhibits no discharge. No scleral icterus.   Neck: Trachea normal. Neck supple. No JVD present. No edema present. No thyroid mass and no thyromegaly present.   Cardiovascular: Normal rate, regular rhythm, S2 normal and normal heart sounds. Exam reveals no gallop, no S3 and no friction rub.   No murmur heard.  Pulmonary/Chest: Effort normal and breath sounds normal. No respiratory distress. She has no wheezes. She has no rales. She exhibits no tenderness.   Abdominal: Soft. Normal appearance and bowel sounds are normal. She exhibits no distension, no ascites and no mass. There is no splenomegaly or hepatomegaly. There is no tenderness. There is no rigidity, no rebound and no guarding. No hernia.   Musculoskeletal: She exhibits no tenderness or deformity.     Vascular Status -  Her right foot exhibits no edema. Her left foot exhibits no edema.  Lymphadenopathy:     She has no cervical adenopathy.        Left: No supraclavicular adenopathy present.   Neurological: She is alert and oriented to person, place, and time. She has normal strength. No cranial nerve deficit or sensory deficit. She exhibits normal muscle tone. Coordination normal.   Skin: No rash noted. She is not diaphoretic. No cyanosis. No pallor. Nails show no clubbing.   Psychiatric: She has a normal mood and affect. Her behavior is normal. Judgment and thought content normal.   Nursing note and vitals reviewed.  Stigmata of chronic liver disease:  None.  Asterixis:  None.    Laboratory Testing:  Upon review of medical records:    Dated July 13, 2018 sodium 140 potassium 4.2 chloride 105 CO2 27 BUN 11 serum creatinine 0.80 glucose 88.  Calcium 9.8.  Total protein 8.1.  Albumin 4.10.  T bili 0.8 AST 41 ALT 33 alkaline phosphatase 75.  Total cholesterol 142.  Triglycerides 67.  Hepatitis A IgM negative.  Hepatitis B surface antigen negative.  Hepatitis B core IgM negative.  Hepatitis C virus antibody positive at >11.0.      Dated July 18, 2018 HCV by PCR quantitative 2,700,000 IU/mL.  6.431 log 10 IU/mL.      Dated September 27, 2018 sodium 142 potassium 4.1 chloride 107 CO2 28 BUN 11 serum creatinine 0.80 glucose 89.  Calcium 9.9.  Total protein 9.0.  Albumin 4.70.  T bili 0.4 AST 41 ALT 48 alkaline phosphatase 102.  Serum iron 87.  TIBC 375.  Iron saturation 23%.  Ferritin 339.00.  Alpha-1 antitrypsin level 185.  Phenotype MM.  DAE direct positive.  Smooth muscle antibody negative at 16.  Mitochondrial antibody negative at <20.0.  Hepatitis A IgM negative.  Hepatitis A total antibody negative.  Hepatitis B core IgM negative.  Hepatitis B core total antibody positive.  Hepatitis B surface antibody reactive.  Hepatitis B surface antigen negative.     Dated January 31, 2019 HCV Fibrosure: Fibrosis score 0.64 (elevated).  Fibrosis stage F3-bridging fibrosis with many septa.  Necroinflammatory activity score elevated at 0.23, activity grade A0 to A1.  Alpha-2-macroglobulin quantitative elevated at 344.  Haptoglobin 41.  GGT elevated at 85.Apolipoprotein A-1 level at 197.    Dated April 2, 2019 TSH 0.485.  Vitamin B12 level 375.    Dated April 15, 2019 WBC 6.69 hemoglobin 15.4 hematocrit 45.9 MCV 88.3 platelet count 211.  PT 13.3.  INR 0.98.  PTT 33.2.    Abdominal Imaging:  Upon review of medical records:    Dated April 15, 2019 the patient underwent a CT-guided liver biopsy which revealed: Mild chronic hepatitis (grade 1/4, stage I to early 2/4).  Negative for malignancy.  Sections in the current K showed chronic hepatitis that is fairly mild.  The portal areas are expanded by chronic inflammatory infiltrate, but interface hepatitis, piecemeal necrosis, and overt hepatocyte destruction are not negligible.  There is mild fibrosis (early oneal-portal/portal-portal septae) in one area, but discrete nodular architectural distortion and definite sclerosis are not identified in the specimen.  This is further evidenced by the trichrome stain,  which shows only expanded portal areas and one single portal area with early portal septae.  Iron is present within the iron stain (overall mild).     Procedures:  Upon review of medical records:     Dated August 16, 2018 the patient underwent a colonoscopy to the terminal ileum which revealed: Scant diverticulosis.  Colon polyps.  2 were removed 10-12 mm in size.  Internal hemorrhoids.  No endoscopic evidence of colitis was seen.  Random biopsies were obtained from the colon upon withdrawal of scope. Transverse colon polyp, hot snare, biopsy revealed tubular adenoma.  Negative for high-grade dysplasia or malignancy.  Ascending colon, proximal polypoid area and cecal junction, biopsy revealed benign colonic mucosa with an intramucosal lymphoid aggregate.  Negative for dysplasia or malignancy.  Hepatic flexure polyp, hot snare, biopsy revealed fragments of tubular adenoma.  Negative for high-grade dysplasia or malignancy. Random colon, biopsies revealed benign colonic mucosa with no diagnostic abdomen abnormalities.  Negative for chronic or active colitis including lymphocytic or collagen is colitis.  Negative for dysplasia or malignancy.    Assessment:      ICD-10-CM ICD-9-CM   1. Chronic liver disease K76.9 571.9   2. Chronic hepatitis C without hepatic coma (CMS/HCC) B18.2 070.54   3. Adenomatous polyp of transverse colon D12.3 211.3         Discussion:  1.     Plan/  Patient Instructions   1. Low-fat diet.    2. Avoid alcohol.   3. Treatment for hep C in the near future.  Genotype 1b.  Mavyret for 8 weeks.       Sen Morley MD

## 2019-04-29 NOTE — PATIENT INSTRUCTIONS
1. Low-fat diet.    2. Avoid alcohol.   3. Treatment for hep C in the near future.  Genotype 1b.  Mavyret for 8 weeks.

## 2019-04-30 ENCOUNTER — LAB (OUTPATIENT)
Dept: LAB | Facility: HOSPITAL | Age: 69
End: 2019-04-30

## 2019-04-30 DIAGNOSIS — B18.2 CHRONIC HEPATITIS C WITHOUT HEPATIC COMA (HCC): ICD-10-CM

## 2019-04-30 LAB
ALBUMIN SERPL-MCNC: 4.1 G/DL (ref 3.5–5)
ALBUMIN/GLOB SERPL: 1 G/DL (ref 1–2)
ALP SERPL-CCNC: 87 U/L (ref 38–126)
ALT SERPL W P-5'-P-CCNC: 31 U/L (ref 13–69)
ANION GAP SERPL CALCULATED.3IONS-SCNC: 6 MMOL/L (ref 10–20)
AST SERPL-CCNC: 41 U/L (ref 15–46)
BILIRUB SERPL-MCNC: 0.5 MG/DL (ref 0.2–1.3)
BUN BLD-MCNC: 10 MG/DL (ref 7–20)
BUN/CREAT SERPL: 11.1 (ref 7.1–23.5)
CALCIUM SPEC-SCNC: 9.6 MG/DL (ref 8.4–10.2)
CHLORIDE SERPL-SCNC: 106 MMOL/L (ref 98–107)
CO2 SERPL-SCNC: 27 MMOL/L (ref 26–30)
CREAT BLD-MCNC: 0.9 MG/DL (ref 0.6–1.3)
GFR SERPL CREATININE-BSD FRML MDRD: 75 ML/MIN/1.73
GLOBULIN UR ELPH-MCNC: 4 GM/DL
GLUCOSE BLD-MCNC: 91 MG/DL (ref 74–98)
LAB AP CASE REPORT: NORMAL
PATH REPORT.ADDENDUM SPEC: NORMAL
PATH REPORT.FINAL DX SPEC: NORMAL
POTASSIUM BLD-SCNC: 4 MMOL/L (ref 3.5–5.1)
PROT SERPL-MCNC: 8.1 G/DL (ref 6.3–8.2)
SODIUM BLD-SCNC: 135 MMOL/L (ref 137–145)

## 2019-04-30 PROCEDURE — 87522 HEPATITIS C REVRS TRNSCRPJ: CPT

## 2019-04-30 PROCEDURE — 87902 NFCT AGT GNTYP ALYS HEP C: CPT

## 2019-04-30 PROCEDURE — 80053 COMPREHEN METABOLIC PANEL: CPT

## 2019-04-30 PROCEDURE — 36415 COLL VENOUS BLD VENIPUNCTURE: CPT

## 2019-05-02 ENCOUNTER — APPOINTMENT (OUTPATIENT)
Dept: MAMMOGRAPHY | Facility: HOSPITAL | Age: 69
End: 2019-05-02

## 2019-05-03 LAB
HCV GENTYP SERPL NAA+PROBE: NORMAL
HCV GENTYP SERPL NAA+PROBE: NORMAL
HCV RNA SERPL NAA+PROBE-ACNC: NORMAL IU/ML
HCV RNA SERPL NAA+PROBE-LOG IU: 5.12 LOG10 IU/ML
Lab: NORMAL
REF LAB TEST REF RANGE: NORMAL

## 2019-05-09 ENCOUNTER — HOSPITAL ENCOUNTER (OUTPATIENT)
Dept: MAMMOGRAPHY | Facility: HOSPITAL | Age: 69
Discharge: HOME | End: 2019-05-09
Admitting: FAMILY MEDICINE

## 2019-05-09 DIAGNOSIS — Z12.39 BREAST CANCER SCREENING: ICD-10-CM

## 2019-05-09 PROCEDURE — 77067 SCR MAMMO BI INCL CAD: CPT

## 2019-05-09 PROCEDURE — 77063 BREAST TOMOSYNTHESIS BI: CPT

## 2019-05-20 ENCOUNTER — OFFICE VISIT (OUTPATIENT)
Dept: GASTROENTEROLOGY | Facility: CLINIC | Age: 69
End: 2019-05-20

## 2019-05-20 VITALS
SYSTOLIC BLOOD PRESSURE: 134 MMHG | DIASTOLIC BLOOD PRESSURE: 72 MMHG | HEIGHT: 62 IN | TEMPERATURE: 98.1 F | BODY MASS INDEX: 20.61 KG/M2 | HEART RATE: 65 BPM | WEIGHT: 112 LBS | RESPIRATION RATE: 16 BRPM

## 2019-05-20 DIAGNOSIS — B18.2 CHRONIC HEPATITIS C WITHOUT HEPATIC COMA (HCC): Primary | ICD-10-CM

## 2019-05-20 DIAGNOSIS — K76.9 CHRONIC LIVER DISEASE: ICD-10-CM

## 2019-05-20 DIAGNOSIS — D12.6 ADENOMATOUS POLYP OF COLON, UNSPECIFIED PART OF COLON: ICD-10-CM

## 2019-05-20 PROCEDURE — 99214 OFFICE O/P EST MOD 30 MIN: CPT | Performed by: INTERNAL MEDICINE

## 2019-05-20 NOTE — PROGRESS NOTES
"Chief Complaint   Patient presents with   • Hepatitis C     History of Present Illness     The patient has history of chronic hepatitis C diagnosed about 2 years ago. The patient had blood transfusion in 1980s. There is no history of drug use. The patient denies history of tattoos. Over the years the patient has history of significant alcohol use. She started drinking at age 18. The patient used to drink 4-5 drinks per day usually 4-5 times a week. She quit alcohol in 2008. Since then the patient has switched to \"nonalcoholic beer\". The patient was drinking 4-5 cans of nonalcoholic beer daily. She has finally quit nonalcoholic beer as well in December 2018. The patient has received first dose of vaccination against hepatitis A in October 2018. It appears that the patient had been exposed to hepatitis B in the past, got rid of it and is currently immune to it. She denies jaundice. There is no darkening of urine color, lightning of stool color or pruritus. Her hepatitis C genotype is 1b.  The patient has also been positive for DAE.     The patient denies further diarrheal symptoms. She is been having one and occasionally 2 formed stools a day. There is no nausea or vomiting. She denies reflux. There is no dysphagia or odynophagia.There is no history of overt GI bleed (Hematemesis, melena or hematochezia). The patient denies recent weight loss.       Review of Systems   Constitutional: Negative for appetite change, chills, fatigue, fever and unexpected weight change.   HENT: Negative for mouth sores, nosebleeds and trouble swallowing.    Eyes: Negative for discharge and redness.   Respiratory: Negative for apnea, cough and shortness of breath.    Cardiovascular: Negative for chest pain, palpitations and leg swelling.   Gastrointestinal: Negative for abdominal distention, abdominal pain, anal bleeding, blood in stool, constipation, diarrhea, nausea and vomiting.   Endocrine: Negative for cold intolerance, heat " "intolerance and polydipsia.   Genitourinary: Negative for dysuria, hematuria and urgency.   Musculoskeletal: Positive for arthralgias. Negative for joint swelling and myalgias.   Skin: Negative for rash.   Allergic/Immunologic: Negative for food allergies and immunocompromised state.   Neurological: Negative for dizziness, seizures, syncope and headaches.   Hematological: Negative for adenopathy. Does not bruise/bleed easily.   Psychiatric/Behavioral: Negative for dysphoric mood. The patient is not nervous/anxious and is not hyperactive.      Patient Active Problem List   Diagnosis   • Essential hypertension   • Chronic neck pain   • Allergic rhinitis due to allergen   • Diarrhea   • Colon cancer screening   • Chronic pain of left knee     Past Medical History:   Diagnosis Date   • Body piercing     EARS   • Hay fever     REPORTS \"WHEN I LIVED IN [place] IN THE [place] AND THE [place]\"   • Hepatitis C    • Hepatitis C    • Hepatitis C    • History of bronchitis    • History of transfusion     REPORTS SHE THINKS SHE HAS HAD A TRANSFUSION IN THE PAST BUT IS NOT CERTAIN. DID REPORTS IF SHE HAD A TRANSFUSION THAT THER IS NO KNOWN REACTION   • Hypertension    • Seasonal allergies    • Wears glasses     RX   • Wears partial dentures     REPORTS SHE WEARS A PARTIAL IN LOWER MOUTH - INSTRUCTED NO ADHESIVES THE DOS     Past Surgical History:   Procedure Laterality Date   • CATARACT EXTRACTION Left    • COLONOSCOPY     • COLONOSCOPY N/A 8/16/2018    Procedure: COLONOSCOPY hot snare polypectomy x2, saline injection, cold biopsies, tattoo injection, resolution clip x3;  Surgeon: Sen Morley MD;  Location: [facility];  Service: Gastroenterology   • JOINT REPLACEMENT Left     KNEE REPLACEMENT, THEN LATER HAD REVISION OF LEFT KNEE   • TONSILLECTOMY     • TUBAL ABDOMINAL LIGATION       Family History   Problem Relation Age of Onset   • Diabetes Mother    • Heart attack Mother    • Stroke Mother    • Diabetes Father  " "  • Heart attack Father    • Stroke Father    • Hyperlipidemia Father    • Hypertension Father    • Colon polyps Brother    • Diabetes Brother    • Cancer Paternal Aunt         stomach   • Diabetes Sister      Social History     Tobacco Use   • Smoking status: Current Every Day Smoker     Packs/day: 0.50     Years: 50.00     Pack years: 25.00     Types: Cigarettes   • Smokeless tobacco: Never Used   • Tobacco comment: REPORTS SHE SMOKES \"OFF AND ON SINCE AGE 18\"   Substance Use Topics   • Alcohol use: No     Frequency: Never     Comment: SOCIAL USE, REPORTS NO HX OF ETOH ABUSE. QUIT 12/2018       Current Outpatient Medications:   •  aspirin 81 MG EC tablet, Take 1 tablet by mouth Daily., Disp: 30 tablet, Rfl: 5  •  cetirizine (zyrTEC) 10 MG tablet, Take 10 mg by mouth Daily As Needed., Disp: , Rfl:   •  HYDROcodone-acetaminophen (NORCO)  MG per tablet, Norco 10 mg-325 mg tablet  1 PO TID-QID PRN PAIN TO LAST FOR 30 DAYS, Disp: , Rfl:   •  lisinopril (PRINIVIL,ZESTRIL) 20 MG tablet, Take 1 tablet by mouth Daily., Disp: 30 tablet, Rfl: 5    No Known Allergies    Blood pressure 134/72, pulse 65, temperature 98.1 °F (36.7 °C), resp. rate 16, height 157.5 cm (62\"), weight 50.8 kg (112 lb), not currently breastfeeding.    Physical Exam   Constitutional: She is oriented to person, place, and time. She appears well-developed and well-nourished. No distress.   HENT:   Head: Normocephalic and atraumatic.   Right Ear: Hearing and external ear normal.   Left Ear: Hearing and external ear normal.   Nose: Nose normal.   Mouth/Throat: Oropharynx is clear and moist and mucous membranes are normal. Mucous membranes are not pale, not dry and not cyanotic. No oral lesions. No oropharyngeal exudate.   Eyes: Conjunctivae and EOM are normal. Right eye exhibits no discharge. Left eye exhibits no discharge. No scleral icterus.   Neck: Trachea normal. Neck supple. No JVD present. No edema present. No thyroid mass and no thyromegaly " present.   Cardiovascular: Normal rate, regular rhythm, S2 normal and normal heart sounds. Exam reveals no gallop, no S3 and no friction rub.   No murmur heard.  Pulmonary/Chest: Effort normal and breath sounds normal. No respiratory distress. She has no wheezes. She has no rales. She exhibits no tenderness.   Abdominal: Soft. Normal appearance and bowel sounds are normal. She exhibits no distension, no ascites and no mass. There is no splenomegaly or hepatomegaly. There is no tenderness. There is no rigidity, no rebound and no guarding. No hernia.   Musculoskeletal: She exhibits no tenderness or deformity.     Vascular Status -  Her right foot exhibits no edema. Her left foot exhibits no edema.  Lymphadenopathy:     She has no cervical adenopathy.        Left: No supraclavicular adenopathy present.   Neurological: She is alert and oriented to person, place, and time. She has normal strength. No cranial nerve deficit or sensory deficit. She exhibits normal muscle tone. Coordination normal.   Skin: No rash noted. She is not diaphoretic. No cyanosis. No pallor. Nails show no clubbing.   Psychiatric: She has a normal mood and affect. Her behavior is normal. Judgment and thought content normal.   Nursing note and vitals reviewed.  Stigmata of chronic liver disease:  + Palmar erythema.  Asterixis:  None.    Laboratory Testing:  Upon review of medical records:     Dated July 13, 2018 sodium 140 potassium 4.2 chloride 105 CO2 27 BUN 11 serum creatinine 0.80 glucose 88.  Calcium 9.8.  Total protein 8.1.  Albumin 4.10.  T bili 0.8 AST 41 ALT 33 alkaline phosphatase 75.  Total cholesterol 142.  Triglycerides 67.  Hepatitis A IgM negative.  Hepatitis B surface antigen negative.  Hepatitis B core IgM negative.  Hepatitis C virus antibody positive at >11.0.     Dated July 18, 2018 HCV by PCR quantitative 2,700,000 IU/mL.  6.431 log 10 IU/mL.      Dated September 27, 2018 sodium 142 potassium 4.1 chloride 107 CO2 28 BUN 11 serum  creatinine 0.80 glucose 89.  Calcium 9.9.  Total protein 9.0.  Albumin 4.70.  T bili 0.4 AST 41 ALT 48 alkaline phosphatase 102.  Serum iron 87.  TIBC 375.  Iron saturation 23%.  Ferritin 339.00.  Alpha-1 antitrypsin level 185.  Phenotype MM.  DAE direct positive.  Smooth muscle antibody negative at 16.  Mitochondrial antibody negative at <20.0.  Hepatitis A IgM negative.  Hepatitis A total antibody negative.  Hepatitis B core IgM negative.  Hepatitis B core total antibody positive.  Hepatitis B surface antibody reactive.  Hepatitis B surface antigen negative.     Dated January 31, 2019 HCV Fibrosure: Fibrosis score 0.64 (elevated).  Fibrosis stage F3-bridging fibrosis with many septa.  Necroinflammatory activity score elevated at 0.23, activity grade A0 to A1.  Alpha-2-macroglobulin quantitative elevated at 344.  Haptoglobin 41.  GGT elevated at 85.Apolipoprotein A-1 level at 197.     Dated April 2, 2019 TSH 0.485.  Vitamin B12 level 375.    Dated April 15, 2019 WBC 6.69 hemoglobin 15.4 hematocrit 45.9 MCV 88.3 platelet count 211.  PT 13.3.  INR 0.98.  PTT 33.2.    Dated April 30, 2019 sodium 135 potassium 4.0 chloride 106 CO2 27 BUN 10 serum creatinine 0.90 glucose 91.  Calcium 9.6.  Total protein 8.1.  Albumin 4.10.  T bili 0.5 AST 41 ALT 31 alkaline phosphatase 87.  HCVRNA by PCR quantitative 132, 000 IU/mL.  5.121 log 10 IU/mL.  Genotype 1b.     Abdominal Imaging:  Upon review of medical records:     Dated April 15, 2019 the patient underwent a CT-guided liver biopsy which revealed: Mild chronic hepatitis (grade 1/4, stage I to early 2/4).  Negative for malignancy.  Sections in the current K showed chronic hepatitis that is fairly mild.  The portal areas are expanded by chronic inflammatory infiltrate, but interface hepatitis, piecemeal necrosis, and overt hepatocyte destruction are not negligible.  There is mild fibrosis (early oneal-portal/portal-portal septae) in one area, but discrete nodular architectural  distortion and definite sclerosis are not identified in the specimen.  This is further evidenced by the trichrome stain, which shows only expanded portal areas and one single portal area with early portal septae.  Iron is present within the iron stain (overall mild).     Procedures:  Upon review of medical records:     Dated August 16, 2018 the patient underwent a colonoscopy to the terminal ileum which revealed: Scant diverticulosis.  Colon polyps.  2 were removed 10-12 mm in size.  Internal hemorrhoids.  No endoscopic evidence of colitis was seen.  Random biopsies were obtained from the colon upon withdrawal of scope. Transverse colon polyp, hot snare, biopsy revealed tubular adenoma.  Negative for high-grade dysplasia or malignancy.  Ascending colon, proximal polypoid area and cecal junction, biopsy revealed benign colonic mucosa with an intramucosal lymphoid aggregate.  Negative for dysplasia or malignancy.  Hepatic flexure polyp, hot snare, biopsy revealed fragments of tubular adenoma.  Negative for high-grade dysplasia or malignancy. Random colon, biopsies revealed benign colonic mucosa with no diagnostic abdomen abnormalities.  Negative for chronic or active colitis including lymphocytic or collagen is colitis.  Negative for dysplasia or malignancy.    Assessment:      ICD-10-CM ICD-9-CM   1. Chronic hepatitis C without hepatic coma (CMS/HCC) B18.2 070.54   2. Chronic liver disease K76.9 571.9   3. Adenomatous polyp of colon, unspecified part of colon D12.6 211.3         Discussion:  1.     Plan/  Patient Instructions   1. Avoid alcohol.   2. The patient was counseled for smoking cessation (Smoking Cessation Counseling asymptomatic//3 minutes).  3. MAVYRET (GLECAPRAVIR 100 mg + PIBRANTASVIR 40 mg).  Take 3 tablets orally at the same time daily for 8 weeks.  4. Potential side effects and drug interactions were discussed with the patient in detail.  5. Follow-up in 4 weeks with CBC and CMP.       Sen BLAKE  MD Milli

## 2019-05-20 NOTE — PATIENT INSTRUCTIONS
1. Avoid alcohol.   2. The patient was counseled for smoking cessation (Smoking Cessation Counseling asymptomatic//3 minutes).  3. MAVYRET (GLECAPRAVIR 100 mg + PIBRANTASVIR 40 mg).  Take 3 tablets orally at the same time daily for 8 weeks.  4. Potential side effects and drug interactions were discussed with the patient in detail.  5. Follow-up in 4 weeks with CBC and CMP.

## 2019-06-11 ENCOUNTER — LAB (OUTPATIENT)
Dept: LAB | Facility: HOSPITAL | Age: 69
End: 2019-06-11

## 2019-06-11 DIAGNOSIS — K76.9 CHRONIC LIVER DISEASE: ICD-10-CM

## 2019-06-11 DIAGNOSIS — B18.2 CHRONIC HEPATITIS C WITHOUT HEPATIC COMA (HCC): ICD-10-CM

## 2019-06-11 LAB
ALBUMIN SERPL-MCNC: 4.2 G/DL (ref 3.5–5)
ALBUMIN/GLOB SERPL: 1 G/DL (ref 1–2)
ALP SERPL-CCNC: 79 U/L (ref 38–126)
ALT SERPL W P-5'-P-CCNC: 15 U/L (ref 13–69)
ANION GAP SERPL CALCULATED.3IONS-SCNC: 10.1 MMOL/L (ref 10–20)
AST SERPL-CCNC: 22 U/L (ref 15–46)
BILIRUB SERPL-MCNC: 0.4 MG/DL (ref 0.2–1.3)
BUN BLD-MCNC: 12 MG/DL (ref 7–20)
BUN/CREAT SERPL: 13.3 (ref 7.1–23.5)
CALCIUM SPEC-SCNC: 9.5 MG/DL (ref 8.4–10.2)
CHLORIDE SERPL-SCNC: 105 MMOL/L (ref 98–107)
CO2 SERPL-SCNC: 30 MMOL/L (ref 26–30)
CREAT BLD-MCNC: 0.9 MG/DL (ref 0.6–1.3)
DEPRECATED RDW RBC AUTO: 41.1 FL (ref 37–54)
ERYTHROCYTE [DISTWIDTH] IN BLOOD BY AUTOMATED COUNT: 12.6 % (ref 12.3–15.4)
GFR SERPL CREATININE-BSD FRML MDRD: 75 ML/MIN/1.73
GLOBULIN UR ELPH-MCNC: 4.2 GM/DL
GLUCOSE BLD-MCNC: 97 MG/DL (ref 74–98)
HCT VFR BLD AUTO: 43.4 % (ref 34–46.6)
HGB BLD-MCNC: 14.5 G/DL (ref 12–15.9)
MCH RBC QN AUTO: 29.5 PG (ref 26.6–33)
MCHC RBC AUTO-ENTMCNC: 33.4 G/DL (ref 31.5–35.7)
MCV RBC AUTO: 88.4 FL (ref 79–97)
PLATELET # BLD AUTO: 176 10*3/MM3 (ref 140–450)
PMV BLD AUTO: 10.7 FL (ref 6–12)
POTASSIUM BLD-SCNC: 4.1 MMOL/L (ref 3.5–5.1)
PROT SERPL-MCNC: 8.4 G/DL (ref 6.3–8.2)
RBC # BLD AUTO: 4.91 10*6/MM3 (ref 3.77–5.28)
SODIUM BLD-SCNC: 141 MMOL/L (ref 137–145)
WBC NRBC COR # BLD: 7.39 10*3/MM3 (ref 3.4–10.8)

## 2019-06-11 PROCEDURE — 80053 COMPREHEN METABOLIC PANEL: CPT

## 2019-06-11 PROCEDURE — 36415 COLL VENOUS BLD VENIPUNCTURE: CPT

## 2019-06-11 PROCEDURE — 85027 COMPLETE CBC AUTOMATED: CPT

## 2019-06-18 ENCOUNTER — OFFICE VISIT (OUTPATIENT)
Dept: GASTROENTEROLOGY | Facility: CLINIC | Age: 69
End: 2019-06-18

## 2019-06-18 VITALS
HEIGHT: 62 IN | DIASTOLIC BLOOD PRESSURE: 68 MMHG | SYSTOLIC BLOOD PRESSURE: 137 MMHG | BODY MASS INDEX: 20.98 KG/M2 | WEIGHT: 114 LBS | HEART RATE: 63 BPM | TEMPERATURE: 97.8 F | RESPIRATION RATE: 16 BRPM

## 2019-06-18 DIAGNOSIS — K76.9 CHRONIC LIVER DISEASE: ICD-10-CM

## 2019-06-18 DIAGNOSIS — D12.6 ADENOMATOUS POLYP OF COLON, UNSPECIFIED PART OF COLON: ICD-10-CM

## 2019-06-18 DIAGNOSIS — B18.2 CHRONIC HEPATITIS C WITHOUT HEPATIC COMA (HCC): Primary | ICD-10-CM

## 2019-06-18 PROCEDURE — 99214 OFFICE O/P EST MOD 30 MIN: CPT | Performed by: INTERNAL MEDICINE

## 2019-06-18 NOTE — PROGRESS NOTES
"Chief Complaint   Patient presents with   • Hepatitis C     History of Present Illness     The patient has history of chronic hep C (genotype 1b) diagnosed about 2-3 years ago.  The patient had blood transfusion in the 1980s.  There is no history of drug use.  She denies history of tattoos.  There is history of significant alcohol use over the years.  The patient started drinking alcohol at age 18.  She used to drink about 4-5 drinks usually 4-5 times a week.  The patient claims that she \"quit alcohol in 2008\".  However the patient switched to \"nonalcoholic beer\".  She was drinking 4 to 5 cans of nonalcoholic beer on daily basis.  She finally quit nonalcoholic beer as well in December 2018.  The patient has been vaccinated against hepatitis A.  It appears that the patient had been exposed to hepatitis B in the past, got rid of it and is currently immune to it.  There is no history of jaundice.  She denies darkening of urine color, lightening of stool color or pruritus.  The patient was also found to be positive for DAE.    The patient denies further diarrhea.  Currently she is been having 1 and occasionally 2 formed stools.  She denies reflux.  There is no dysphagia or odynophagia.  There is no history of hematemesis, melena or hematochezia.  She denies recent weight loss.    Review of Systems   Constitutional: Negative for appetite change, chills, fatigue, fever and unexpected weight change.   HENT: Negative for mouth sores, nosebleeds and trouble swallowing.    Eyes: Negative for discharge and redness.   Respiratory: Negative for apnea, cough and shortness of breath.    Cardiovascular: Negative for chest pain, palpitations and leg swelling.   Gastrointestinal: Negative for abdominal distention, abdominal pain, anal bleeding, blood in stool, constipation, diarrhea, nausea and vomiting.   Endocrine: Negative for cold intolerance, heat intolerance and polydipsia.   Genitourinary: Negative for dysuria, hematuria and " "urgency.   Musculoskeletal: Negative for arthralgias, joint swelling and myalgias.   Skin: Negative for rash.   Allergic/Immunologic: Negative for food allergies and immunocompromised state.   Neurological: Negative for dizziness, seizures, syncope and headaches.   Hematological: Negative for adenopathy. Does not bruise/bleed easily.   Psychiatric/Behavioral: Negative for dysphoric mood. The patient is not nervous/anxious and is not hyperactive.      Patient Active Problem List   Diagnosis   • Essential hypertension   • Chronic neck pain   • Allergic rhinitis due to allergen   • Diarrhea   • Colon cancer screening   • Chronic pain of left knee   • Possible exposure to STD   • Vaginal burning     Past Medical History:   Diagnosis Date   • Body piercing     EARS   • Hay fever     REPORTS \"WHEN I LIVED IN [place] IN THE [place]LY SPR[place] AND THE [place]LY [place]\"   • Hepatitis C    • History of bronchitis    • History of transfusion     REPORTS SHE THINKS SHE HAS HAD A TRANSFUSION IN THE PAST BUT IS NOT CERTAIN. DID REPORTS IF SHE HAD A TRANSFUSION THAT THER IS NO KNOWN REACTION   • Hypertension    • Seasonal allergies    • Wears glasses     RX   • Wears partial dentures     REPORTS SHE WEARS A PARTIAL IN LOWER MOUTH - INSTRUCTED NO ADHESIVES THE DOS     Past Surgical History:   Procedure Laterality Date   • CATARACT EXTRACTION Left    • COLONOSCOPY     • COLONOSCOPY N/A 8/16/2018    Procedure: COLONOSCOPY hot snare polypectomy x2, saline injection, cold biopsies, tattoo injection, resolution clip x3;  Surgeon: Sen Morley MD;  Location: [facility] ENDOSCOPY;  Service: Gastroenterology   • JOINT REPLACEMENT Left     KNEE REPLACEMENT, THEN LATER HAD REVISION OF LEFT KNEE   • TONSILLECTOMY     • TUBAL ABDOMINAL LIGATION       Family History   Problem Relation Age of Onset   • Diabetes Mother    • Heart attack Mother    • Stroke Mother    • Diabetes Father    • Heart attack Father    • Stroke Father    • Hyperlipidemia Father    • " "Hypertension Father    • Colon polyps Brother    • Diabetes Brother    • Cancer Paternal Aunt         stomach   • Diabetes Sister    • Colon cancer Neg Hx      Social History     Tobacco Use   • Smoking status: Current Every Day Smoker     Packs/day: 0.50     Years: 50.00     Pack years: 25.00     Types: Cigarettes   • Smokeless tobacco: Never Used   • Tobacco comment: REPORTS SHE SMOKES \"OFF AND ON SINCE AGE 18\"   Substance Use Topics   • Alcohol use: No     Frequency: Never     Comment: SOCIAL USE, REPORTS NO HX OF ETOH ABUSE. QUIT 12/2018       Current Outpatient Medications:   •  aspirin 81 MG EC tablet, Take 1 tablet by mouth Daily., Disp: 30 tablet, Rfl: 5  •  HYDROcodone-acetaminophen (NORCO)  MG per tablet, Norco 10 mg-325 mg tablet  1 PO TID-QID PRN PAIN TO LAST FOR 30 DAYS, Disp: , Rfl:   •  Blood Pressure Monitor device, For Daily blood pressure measurements., Disp: 1 each, Rfl: 0  •  cetirizine (zyrTEC) 10 MG tablet, Take 1 tablet by mouth Daily As Needed for Allergies., Disp: 30 tablet, Rfl: 0  •  HYDROCHLOROTHIAZIDE PO, Take 0.5 tablets by mouth Daily As Needed., Disp: , Rfl:   •  minoxidil (LONITEN) 10 MG tablet, Take 1 tablet by mouth Daily. Take 1/2 pill QD for one week, then take whole pill QD, Disp: 30 tablet, Rfl: 2    No Known Allergies    Blood pressure 137/68, pulse 63, temperature 97.8 °F (36.6 °C), resp. rate 16, height 157.5 cm (62\"), weight 51.7 kg (114 lb), not currently breastfeeding.    Physical Exam   Constitutional: She is oriented to person, place, and time. She appears well-developed and well-nourished. No distress.   HENT:   Head: Normocephalic and atraumatic.   Right Ear: Hearing and external ear normal.   Left Ear: Hearing and external ear normal.   Nose: Nose normal.   Mouth/Throat: Oropharynx is clear and moist and mucous membranes are normal. Mucous membranes are not pale, not dry and not cyanotic. No oral lesions. No oropharyngeal exudate.   Eyes: Conjunctivae and EOM " are normal. Right eye exhibits no discharge. Left eye exhibits no discharge. No scleral icterus.   Neck: Trachea normal. Neck supple. No JVD present. No edema present. No thyroid mass and no thyromegaly present.   Cardiovascular: Normal rate, regular rhythm, S2 normal and normal heart sounds. Exam reveals no gallop, no S3 and no friction rub.   No murmur heard.  Pulmonary/Chest: Effort normal and breath sounds normal. No respiratory distress. She has no wheezes. She has no rales. She exhibits no tenderness.   Abdominal: Soft. Normal appearance and bowel sounds are normal. She exhibits no distension, no ascites and no mass. There is no splenomegaly or hepatomegaly. There is no tenderness. There is no rigidity, no rebound and no guarding. No hernia.   Musculoskeletal: She exhibits no tenderness or deformity.     Vascular Status -  Her right foot exhibits no edema. Her left foot exhibits no edema.  Lymphadenopathy:     She has no cervical adenopathy.        Left: No supraclavicular adenopathy present.   Neurological: She is alert and oriented to person, place, and time. She has normal strength. No cranial nerve deficit or sensory deficit. She exhibits normal muscle tone. Coordination normal.   Skin: No rash noted. She is not diaphoretic. No cyanosis. No pallor. Nails show no clubbing.   Psychiatric: She has a normal mood and affect. Her behavior is normal. Judgment and thought content normal.   Nursing note and vitals reviewed.  Stigmata of chronic liver disease:  None.  Asterixis:  None.    Laboratory Testing:  Upon review of medical records:    Dated July 13, 2018 sodium 140 potassium 4.2 chloride 105 CO2 27 BUN 11 serum creatinine 0.80 glucose 88.  Calcium 9.8.  Total protein 8.1.  Albumin 4.10.  T bili 0.8 AST 41 ALT 33 alkaline phosphatase 75.  Total cholesterol 142.  Triglycerides 67.  Hepatitis A IgM negative.  Hepatitis B surface antigen negative.  Hepatitis B core IgM negative.  Hepatitis C virus antibody  positive at >11.0.     Dated July 18, 2018 HCV by PCR quantitative 2,700,000 IU/mL.  6.431 log 10 IU/mL.      Dated September 27, 2018 sodium 142 potassium 4.1 chloride 107 CO2 28 BUN 11 serum creatinine 0.80 glucose 89.  Calcium 9.9.  Total protein 9.0.  Albumin 4.70.  T bili 0.4 AST 41 ALT 48 alkaline phosphatase 102.  Serum iron 87.  TIBC 375.  Iron saturation 23%.  Ferritin 339.00.  Alpha-1 antitrypsin level 185.  Phenotype MM.  DAE direct positive.  Smooth muscle antibody negative at 16.  Mitochondrial antibody negative at <20.0.  Hepatitis A IgM negative.  Hepatitis A total antibody negative.  Hepatitis B core IgM negative.  Hepatitis B core total antibody positive.  Hepatitis B surface antibody reactive.  Hepatitis B surface antigen negative.     Dated January 31, 2019 HCV Fibrosure: Fibrosis score 0.64 (elevated).  Fibrosis stage F3-bridging fibrosis with many septa.  Necroinflammatory activity score elevated at 0.23, activity grade A0 to A1.  Alpha-2-macroglobulin quantitative elevated at 344.  Haptoglobin 41.  GGT elevated at 85.Apolipoprotein A-1 level at 197.     Dated April 2, 2019 TSH 0.485.  Vitamin B12 level 375.    Dated April 15, 2019 WBC 6.69 hemoglobin 15.4 hematocrit 45.9 MCV 88.3 platelet count 211.  PT 13.3.  INR 0.98.  PTT 33.2.     Dated April 30, 2019 sodium 135 potassium 4.0 chloride 106 CO2 27 BUN 10 serum creatinine 0.90 glucose 91.  Calcium 9.6.  Total protein 8.1.  Albumin 4.10.  T bili 0.5 AST 41 ALT 31 alkaline phosphatase 87.  HCVRNA by PCR quantitative 132, 000 IU/mL.  5.121 log 10 IU/mL.  Genotype 1b.    Dated June 11, 2019 sodium 141 potassium 4.1 chloride 105 CO2 30 BUN 12 serum creatinine 0.90 glucose 97.  Calcium 9.5.  Total protein 8.4.  Albumin 4.20.  T bili 0.4 AST 22 ALT 15 alkaline phosphatase 79.  WBC 7.39 hemoglobin 14.5 hematocrit 43.4 MCV 88.4 and platelet count 176.     Abdominal Imaging:  Upon review of medical records:     Dated April 15, 2019 the patient underwent  a CT-guided liver biopsy which revealed: Mild chronic hepatitis (grade 1/4, stage I to early 2/4).  Negative for malignancy.  Sections in the current K showed chronic hepatitis that is fairly mild.  The portal areas are expanded by chronic inflammatory infiltrate, but interface hepatitis, piecemeal necrosis, and overt hepatocyte destruction are not negligible.  There is mild fibrosis (early oneal-portal/portal-portal septae) in one area, but discrete nodular architectural distortion and definite sclerosis are not identified in the specimen.  This is further evidenced by the trichrome stain, which shows only expanded portal areas and one single portal area with early portal septae.  Iron is present within the iron stain (overall mild).     Procedures:  Upon review of medical records:     Dated August 16, 2018 the patient underwent a colonoscopy to the terminal ileum which revealed: Scant diverticulosis.  Colon polyps.  2 were removed 10-12 mm in size.  Internal hemorrhoids.  No endoscopic evidence of colitis was seen.  Random biopsies were obtained from the colon upon withdrawal of scope. Transverse colon polyp, hot snare, biopsy revealed tubular adenoma.  Negative for high-grade dysplasia or malignancy.  Ascending colon, proximal polypoid area and cecal junction, biopsy revealed benign colonic mucosa with an intramucosal lymphoid aggregate.  Negative for dysplasia or malignancy.  Hepatic flexure polyp, hot snare, biopsy revealed fragments of tubular adenoma.  Negative for high-grade dysplasia or malignancy. Random colon, biopsies revealed benign colonic mucosa with no diagnostic abdomen abnormalities.  Negative for chronic or active colitis including lymphocytic or collagen is colitis.  Negative for dysplasia or malignancy.      Assessment:      ICD-10-CM ICD-9-CM   1. Chronic hepatitis C without hepatic coma (CMS/HCC) B18.2 070.54   2. Adenomatous polyp of colon, unspecified part of colon D12.6 211.3   3. Chronic  liver disease K76.9 571.9         Discussion:  1.     Plan/  Patient Instructions   1. Avoid alcohol.   2. The patient was counseled for smoking cessation (Smoking Cessation Counseling asymptomatic//3 minutes).  3. MAVYRET (GLECAPRAVIR 100 mg + PIBRANTASVIR 40 mg).  Take 3 tablets orally at the same time daily for a total 8 weeks.  4. Potential side effects and drug interactions were discussed with the patient in detail.  5. Follow-up in 8 weeks with CBC and CMP, HCV RNA by PCR-quantitative.  Have the labs drawn 1 week prior to the visit.         Sen Morley MD

## 2019-06-18 NOTE — PATIENT INSTRUCTIONS
1. Avoid alcohol.   2. The patient was counseled for smoking cessation (Smoking Cessation Counseling asymptomatic//3 minutes).  3. MAVYRET (GLECAPRAVIR 100 mg + PIBRANTASVIR 40 mg).  Take 3 tablets orally at the same time daily for a total 8 weeks.  4. Potential side effects and drug interactions were discussed with the patient in detail.  5. Follow-up in 8 weeks with CBC and CMP, HCV RNA by PCR-quantitative.  Have the labs drawn 1 week prior to the visit.

## 2019-06-19 RX ORDER — CETIRIZINE HYDROCHLORIDE 10 MG/1
10 TABLET ORAL DAILY PRN
Qty: 30 TABLET | Refills: 0 | Status: SHIPPED | OUTPATIENT
Start: 2019-06-19 | End: 2019-08-26 | Stop reason: SDUPTHER

## 2019-06-19 RX ORDER — CETIRIZINE HYDROCHLORIDE 10 MG/1
10 TABLET ORAL DAILY
Qty: 30 TABLET | Refills: 5 | OUTPATIENT
Start: 2019-06-19

## 2019-07-16 ENCOUNTER — OFFICE VISIT (OUTPATIENT)
Dept: INTERNAL MEDICINE | Facility: CLINIC | Age: 69
End: 2019-07-16

## 2019-07-16 VITALS
SYSTOLIC BLOOD PRESSURE: 128 MMHG | HEIGHT: 62 IN | WEIGHT: 112 LBS | DIASTOLIC BLOOD PRESSURE: 60 MMHG | HEART RATE: 73 BPM | OXYGEN SATURATION: 100 % | TEMPERATURE: 97 F | BODY MASS INDEX: 20.61 KG/M2

## 2019-07-16 DIAGNOSIS — I10 ESSENTIAL HYPERTENSION: ICD-10-CM

## 2019-07-16 DIAGNOSIS — B18.2 CHRONIC HEPATITIS C WITHOUT HEPATIC COMA (HCC): ICD-10-CM

## 2019-07-16 DIAGNOSIS — E78.49 OTHER HYPERLIPIDEMIA: ICD-10-CM

## 2019-07-16 DIAGNOSIS — Z00.00 ENCOUNTER FOR PREVENTIVE HEALTH EXAMINATION: Primary | ICD-10-CM

## 2019-07-16 LAB
ALBUMIN SERPL-MCNC: 4 G/DL (ref 3.5–5)
ALBUMIN/GLOB SERPL: 1 G/DL (ref 1–2)
ALP SERPL-CCNC: 97 U/L (ref 38–126)
ALT SERPL-CCNC: 21 U/L (ref 13–69)
AST SERPL-CCNC: 24 U/L (ref 15–46)
BASOPHILS # BLD AUTO: 0.02 10*3/MM3 (ref 0–0.2)
BASOPHILS NFR BLD AUTO: 0.2 % (ref 0–1.5)
BILIRUB SERPL-MCNC: 0.3 MG/DL (ref 0.2–1.3)
BUN SERPL-MCNC: 14 MG/DL (ref 7–20)
BUN/CREAT SERPL: 17.5 (ref 7.1–23.5)
CALCIUM SERPL-MCNC: 9.6 MG/DL (ref 8.4–10.2)
CHLORIDE SERPL-SCNC: 108 MMOL/L (ref 98–107)
CHOLEST SERPL-MCNC: 157 MG/DL (ref 0–199)
CO2 SERPL-SCNC: 26 MMOL/L (ref 26–30)
CREAT SERPL-MCNC: 0.8 MG/DL (ref 0.6–1.3)
EOSINOPHIL # BLD AUTO: 0.31 10*3/MM3 (ref 0–0.4)
EOSINOPHIL NFR BLD AUTO: 3.8 % (ref 0.3–6.2)
ERYTHROCYTE [DISTWIDTH] IN BLOOD BY AUTOMATED COUNT: 13.1 % (ref 12.3–15.4)
GLOBULIN SER CALC-MCNC: 3.9 GM/DL
GLUCOSE SERPL-MCNC: 82 MG/DL (ref 74–98)
HCT VFR BLD AUTO: 43 % (ref 34–46.6)
HDLC SERPL-MCNC: 60 MG/DL (ref 40–60)
HGB BLD-MCNC: 14.3 G/DL (ref 12–15.9)
IMM GRANULOCYTES # BLD AUTO: 0.02 10*3/MM3 (ref 0–0.05)
IMM GRANULOCYTES NFR BLD AUTO: 0.2 % (ref 0–0.5)
LDLC SERPL CALC-MCNC: 79 MG/DL (ref 0–99)
LYMPHOCYTES # BLD AUTO: 3.07 10*3/MM3 (ref 0.7–3.1)
LYMPHOCYTES NFR BLD AUTO: 37.7 % (ref 19.6–45.3)
MCH RBC QN AUTO: 29.6 PG (ref 26.6–33)
MCHC RBC AUTO-ENTMCNC: 33.3 G/DL (ref 31.5–35.7)
MCV RBC AUTO: 89 FL (ref 79–97)
MONOCYTES # BLD AUTO: 0.53 10*3/MM3 (ref 0.1–0.9)
MONOCYTES NFR BLD AUTO: 6.5 % (ref 5–12)
NEUTROPHILS # BLD AUTO: 4.19 10*3/MM3 (ref 1.7–7)
NEUTROPHILS NFR BLD AUTO: 51.6 % (ref 42.7–76)
NRBC BLD AUTO-RTO: 0 /100 WBC (ref 0–0.2)
PLATELET # BLD AUTO: 192 10*3/MM3 (ref 140–450)
POTASSIUM SERPL-SCNC: 4 MMOL/L (ref 3.5–5.1)
PROT SERPL-MCNC: 7.9 G/DL (ref 6.3–8.2)
RBC # BLD AUTO: 4.83 10*6/MM3 (ref 3.77–5.28)
SODIUM SERPL-SCNC: 142 MMOL/L (ref 137–145)
TRIGL SERPL-MCNC: 91 MG/DL
VLDLC SERPL CALC-MCNC: 18.2 MG/DL
WBC # BLD AUTO: 8.14 10*3/MM3 (ref 3.4–10.8)

## 2019-07-16 PROCEDURE — G0439 PPPS, SUBSEQ VISIT: HCPCS | Performed by: INTERNAL MEDICINE

## 2019-07-16 PROCEDURE — 99397 PER PM REEVAL EST PAT 65+ YR: CPT | Performed by: INTERNAL MEDICINE

## 2019-07-16 NOTE — PROGRESS NOTES
"QUICK REFERENCE INFORMATION:  The ABCs of the Annual Wellness Visit    Subsequent Medicare Wellness Visit    Subjective   History of Present Illness    Mya Hawkins is a 69 y.o. female who presents for an Subsequent Wellness Visit. In addition, we addressed the following health issues: Patient shares that she has been following with gastroenterology recently and will be completing her last dose of hep C tablets today.  Blood pressure has been well controlled with her lisinopril which she has been taking regularly.  She continues to follow with pain management for chronic left knee pain.  Seasonal allergies are well controlled with current medications.    HEALTH RISK ASSESSMENT    1950    Recent Hospitalizations:  No hospitalization(s) within the last year..        Current Medical Providers:  Patient Care Team:  Garland Rodriguez DO as PCP - General (Internal Medicine)        Smoking Status:  Social History     Tobacco Use   Smoking Status Current Every Day Smoker   • Packs/day: 0.50   • Years: 50.00   • Pack years: 25.00   • Types: Cigarettes   Smokeless Tobacco Never Used   Tobacco Comment    REPORTS SHE SMOKES \"OFF AND ON SINCE AGE 18\"       Alcohol Consumption:  Social History     Substance and Sexual Activity   Alcohol Use No   • Frequency: Never    Comment: SOCIAL USE, REPORTS NO HX OF ETOH ABUSE. QUIT 12/2018       Depression Screen:   PHQ-2/PHQ-9 Depression Screening 4/2/2019   Little interest or pleasure in doing things 0   Feeling down, depressed, or hopeless 0   Total Score 0       Health Habits and Functional and Cognitive Screening:  Functional & Cognitive Status 7/16/2019   Do you have difficulty preparing food and eating? No   Do you have difficulty bathing yourself, getting dressed or grooming yourself? No   Do you have difficulty using the toilet? No   Do you have difficulty moving around from place to place? No   Do you have trouble with steps or getting out of a bed or a chair? Yes "   Current Diet Well Balanced Diet   Dental Exam Up to date   Eye Exam Up to date   Exercise (times per week) 7 times per week   Current Exercise Activities Include Housecleaning   Do you need help using the phone?  No   Are you deaf or do you have serious difficulty hearing?  No   Do you need help with transportation? No   Do you need help shopping? No   Do you need help preparing meals?  No   Do you need help with housework?  No   Do you need help with laundry? No   Do you need help taking your medications? No   Do you need help managing money? No   Do you ever drive or ride in a car without wearing a seat belt? No   Have you felt unusual stress, anger or loneliness in the last month? No   Who do you live with? Other   If you need help, do you have trouble finding someone available to you? No   Do you have difficulty concentrating, remembering or making decisions? No           Does the patient have evidence of cognitive impairment? No    Aspirin use counseling: Taking ASA appropriately as indicated      Recent Lab Results:  CMP:  Lab Results   Component Value Date    GLU 88 07/13/2018    BUN 12 06/11/2019    CREATININE 0.90 06/11/2019    EGFRIFNONA 71 07/13/2018    EGFRIFAFRI 75 06/11/2019    BCR 13.3 06/11/2019     06/11/2019    K 4.1 06/11/2019    CO2 30.0 06/11/2019    CALCIUM 9.5 06/11/2019    PROTENTOTREF 8.1 07/13/2018    ALBUMIN 4.20 06/11/2019    LABGLOBREF 4.0 07/13/2018    LABIL2 1.0 07/13/2018    BILITOT 0.4 06/11/2019    ALKPHOS 79 06/11/2019    AST 22 06/11/2019    ALT 15 06/11/2019     Lipid Panel:  Lab Results   Component Value Date    TRIG 67 07/13/2018    HDL 50 07/13/2018    VLDL 13.4 07/13/2018     HbA1c:       Visual Acuity:  No exam data present    Age-appropriate Screening Schedule:  Refer to the list below for future screening recommendations based on patient's age, sex and/or medical conditions. Orders for these recommended tests are listed in the plan section. The patient has been  provided with a written plan.    Health Maintenance   Topic Date Due   • TDAP/TD VACCINES (1 - Tdap) 05/24/1969   • PNEUMOCOCCAL VACCINES (65+ LOW/MEDIUM RISK) (1 of 2 - PCV13) 05/24/2015   • LIPID PANEL  07/13/2019   • ZOSTER VACCINE (1 of 2) 07/24/2020 (Originally 5/24/2000)   • INFLUENZA VACCINE  08/01/2019   • MAMMOGRAM  05/09/2021   • COLONOSCOPY  08/16/2028          The following portions of the patient's history were reviewed and updated as appropriate: allergies, current medications, past family history, past medical history, past social history, past surgical history and problem list.    Outpatient Medications Prior to Visit   Medication Sig Dispense Refill   • aspirin 81 MG EC tablet Take 1 tablet by mouth Daily. 30 tablet 5   • cetirizine (zyrTEC) 10 MG tablet Take 1 tablet by mouth Daily As Needed for Allergies. 30 tablet 0   • Glecaprevir-Pibrentasvir (MAVYRET) 100-40 MG tablet Take  by mouth.     • HYDROcodone-acetaminophen (NORCO)  MG per tablet Norco 10 mg-325 mg tablet   1 PO TID-QID PRN PAIN TO LAST FOR 30 DAYS     • lisinopril (PRINIVIL,ZESTRIL) 20 MG tablet Take 1 tablet by mouth Daily. 30 tablet 5     No facility-administered medications prior to visit.        Patient Active Problem List   Diagnosis   • Essential hypertension   • Chronic neck pain   • Allergic rhinitis due to allergen   • Diarrhea   • Colon cancer screening   • Chronic pain of left knee       Advance Care Planning:  Patient does not have an advance directive - information provided to the patient today    Identification of Risk Factors:  Risk factors include: Advance Directive Discussion  Cardiovascular risk.    Review of Systems   Constitutional: Negative for chills, fatigue and fever.   HENT: Negative for congestion, ear pain, rhinorrhea, sinus pressure and sore throat.    Eyes: Negative for visual disturbance.   Respiratory: Negative for cough, chest tightness, shortness of breath and wheezing.    Cardiovascular:  "Negative for chest pain, palpitations and leg swelling.   Gastrointestinal: Negative for abdominal pain, blood in stool, constipation, diarrhea, nausea and vomiting.   Endocrine: Negative for polydipsia and polyuria.   Genitourinary: Negative for dysuria and hematuria.   Musculoskeletal: Negative for arthralgias and back pain.   Skin: Negative for rash.   Neurological: Negative for dizziness, light-headedness, numbness and headaches.   Psychiatric/Behavioral: Negative for dysphoric mood and sleep disturbance. The patient is not nervous/anxious.        Compared to one year ago, the patient feels her physical health is the same.  Compared to one year ago, the patient feels her mental health is the same.    Objective     Physical Exam   Constitutional: She is oriented to person, place, and time. She appears well-developed and well-nourished.   HENT:   Head: Normocephalic and atraumatic.   Mouth/Throat: Oropharynx is clear and moist. No oropharyngeal exudate.   Eyes: Conjunctivae and EOM are normal. Pupils are equal, round, and reactive to light. No scleral icterus.   Neck: Normal range of motion. Neck supple. No thyromegaly present.   Cardiovascular: Normal rate, regular rhythm and normal heart sounds. Exam reveals no gallop and no friction rub.   No murmur heard.  Pulmonary/Chest: Effort normal and breath sounds normal. No respiratory distress. She has no wheezes.   Abdominal: Soft. Bowel sounds are normal. She exhibits no distension. There is no tenderness.   Musculoskeletal: Normal range of motion.   Lymphadenopathy:     She has no cervical adenopathy.   Neurological: She is alert and oriented to person, place, and time.   Skin: Skin is warm and dry. No rash noted.   Psychiatric: She has a normal mood and affect. Her behavior is normal.   Nursing note and vitals reviewed.      Vitals:    07/16/19 0948   BP: 128/60   Pulse: 73   Temp: 97 °F (36.1 °C)   SpO2: 100%   Weight: 50.8 kg (112 lb)   Height: 157.5 cm (62\") "   PainSc:   8       Patient's Body mass index is 20.49 kg/m². BMI is within normal parameters. No follow-up required..      Assessment/Plan   Patient Self-Management and Personalized Health Advice  The patient has been provided with information about: diet, exercise and designing advance directives and preventive services including:   · Annual Wellness Visit (AWV)  · Hepatitis C Virus Screening (beneficiaries must fall into one of the following categories to be eligible- high risk for HCV infection, born between 9332-7697, or history of blood transfusion before 1992).    Visit Diagnoses:    ICD-10-CM ICD-9-CM   1. Encounter for preventive health examination Z00.00 V70.0   2. Essential hypertension I10 401.9   3. Hepatitis C antibody test positive R76.8 795.79   4. Other hyperlipidemia E78.49 272.4   5. Chronic hepatitis C without hepatic coma (CMS/HCC) B18.2 070.54       Discussion Summary:  Patient is a 69 y.o. female who presents for an Subsequent Wellness Visit.     1. Preventive Health Maintenance  - Baseline labs ordered per above.  - Vaccines reviewed and updated  - Preventive health measures were discussed including: healthy diet with increase in fruits and vegetables, regular exercise at least 3 times a week, safe sex practices, avoidance of drugs, tobacco, and alcohol, and regular seatbelt use.    2. Essential Hypertension  -Well-controlled with current medications.  No refills needed.    3. Chronic Hepatitis C  - Following with gastroenterology.  Patient has been on antiviral medications and is doing well on them.    4. Hyperlipidemia  - Stable with current medications.  Check lipids.        No orders of the defined types were placed in this encounter.      Outpatient Encounter Medications as of 7/16/2019   Medication Sig Dispense Refill   • aspirin 81 MG EC tablet Take 1 tablet by mouth Daily. 30 tablet 5   • cetirizine (zyrTEC) 10 MG tablet Take 1 tablet by mouth Daily As Needed for Allergies. 30 tablet  0   • Glecaprevir-Pibrentasvir (MAVYRET) 100-40 MG tablet Take  by mouth.     • HYDROcodone-acetaminophen (NORCO)  MG per tablet Norco 10 mg-325 mg tablet   1 PO TID-QID PRN PAIN TO LAST FOR 30 DAYS     • lisinopril (PRINIVIL,ZESTRIL) 20 MG tablet Take 1 tablet by mouth Daily. 30 tablet 5     No facility-administered encounter medications on file as of 7/16/2019.        Reviewed use of high risk medication in the elderly: yes  Reviewed for potential of harmful drug interactions in the elderly: yes    Follow Up:  No Follow-up on file.     An After Visit Summary and PPPS with all of these plans were given to the patient.

## 2019-07-16 NOTE — PATIENT INSTRUCTIONS
Medicare Wellness  Personal Prevention Plan of Service     Date of Office Visit:  2019  Encounter Provider:  Garland Rodriguez DO  Place of Service:  [facility]  Patient Name: Mya Hawkins  :  1950    As part of the Medicare Wellness portion of your visit today, we are providing you with this personalized preventive plan of services (PPPS). This plan is based upon recommendations of the [place] Preventive Services Task Force (USPSTF) and the Advisory Committee on Immunization Practices (ACIP).    This lists the preventive care services that should be considered, and provides dates of when you are due. Items listed as completed are up-to-date and do not require any further intervention.    Health Maintenance   Topic Date Due   • TDAP/TD VACCINES (1 - Tdap) 1969   • PNEUMOCOCCAL VACCINES (65+ LOW/MEDIUM RISK) (1 of 2 - PCV13) 2015   • MEDICARE ANNUAL WELLNESS  2019   • LIPID PANEL  2019   • ZOSTER VACCINE (1 of 2) 2020 (Originally 2000)   • INFLUENZA VACCINE  2019   • MAMMOGRAM  2021   • COLONOSCOPY  2028   • HEPATITIS C SCREENING  Completed       Orders Placed This Encounter   Procedures   • Comprehensive Metabolic Panel   • Lipid Panel   • CBC & Differential     Order Specific Question:   Manual Differential     Answer:   No       Return in about 6 months (around 2020) for Next scheduled follow up.

## 2019-08-06 ENCOUNTER — LAB (OUTPATIENT)
Dept: LAB | Facility: HOSPITAL | Age: 69
End: 2019-08-06

## 2019-08-06 DIAGNOSIS — B18.2 CHRONIC HEPATITIS C WITHOUT HEPATIC COMA (HCC): ICD-10-CM

## 2019-08-06 PROCEDURE — 87522 HEPATITIS C REVRS TRNSCRPJ: CPT

## 2019-08-06 PROCEDURE — 36415 COLL VENOUS BLD VENIPUNCTURE: CPT

## 2019-08-08 LAB
HCV GENTYP SERPL NAA+PROBE: NORMAL
HCV RNA SERPL NAA+PROBE-ACNC: NORMAL IU/ML
HCV RNA SERPL NAA+PROBE-LOG IU: NORMAL LOG10 IU/ML
REF LAB TEST REF RANGE: NORMAL

## 2019-08-13 ENCOUNTER — OFFICE VISIT (OUTPATIENT)
Dept: GASTROENTEROLOGY | Facility: CLINIC | Age: 69
End: 2019-08-13

## 2019-08-13 VITALS
BODY MASS INDEX: 19.88 KG/M2 | RESPIRATION RATE: 18 BRPM | WEIGHT: 108 LBS | SYSTOLIC BLOOD PRESSURE: 128 MMHG | HEIGHT: 62 IN | HEART RATE: 71 BPM | DIASTOLIC BLOOD PRESSURE: 75 MMHG

## 2019-08-13 DIAGNOSIS — B18.2 CHRONIC HEPATITIS C WITHOUT HEPATIC COMA (HCC): Primary | ICD-10-CM

## 2019-08-13 DIAGNOSIS — Z86.19 HISTORY OF HEPATITIS C: Primary | ICD-10-CM

## 2019-08-13 PROCEDURE — 99214 OFFICE O/P EST MOD 30 MIN: CPT | Performed by: INTERNAL MEDICINE

## 2019-08-13 NOTE — PROGRESS NOTES
"Chief Complaint   Patient presents with   • Hepatitis C     History of Present Illness     The patient has history of chronic hepatitis C diagnosed about 2 years ago. The patient had blood transfusion in 1980s. There is no history of drug use. The patient denies history of tattoos. Over the years the patient has history of significant alcohol use. She started drinking at age 18. The patient used to drink 4-5 drinks per day usually 4-5 times a week. She quit alcohol in 2008. Since then the patient has switched to \"nonalcoholic beer\". The patient was drinking 4-5 cans of nonalcoholic beer daily. She has finally quit nonalcoholic beer as well in December 2018. The patient has received first dose of vaccination against hepatitis A in October 2018. It appears that the patient had been exposed to hepatitis B in the past, got rid of it and is currently immune to it. She denies jaundice. There is no darkening of urine color, lightning of stool color or pruritus. Her hepatitis C genotype is 1b.  The patient has also been positive for DAE.  The patient has been treated with MAVYRET (GLECAPRAVIR 100 mg + PIBRANTASVIR 40 mg) for 8 weeks.  She finished the course on July 16, 2019.  Dated August 6, 2019 HCVRNA by PCR was not detectable (cutoff 15 IU /mL).     The patient denies further diarrheal symptoms. She is been having one and occasionally 2 formed stools a day. There is no nausea or vomiting. She denies reflux. There is no dysphagia or odynophagia.There is no history of overt GI bleed (Hematemesis, melena or hematochezia). The patient denies recent weight loss.    Review of Systems   Constitutional: Negative for appetite change, chills, fatigue, fever and unexpected weight change.   HENT: Negative for mouth sores, nosebleeds and trouble swallowing.    Eyes: Negative for discharge and redness.   Respiratory: Negative for apnea, cough and shortness of breath.    Cardiovascular: Negative for chest pain, palpitations and leg " "swelling.   Gastrointestinal: Negative for abdominal distention, abdominal pain, anal bleeding, blood in stool, constipation, diarrhea, nausea and vomiting.   Endocrine: Negative for cold intolerance, heat intolerance and polydipsia.   Genitourinary: Negative for dysuria, hematuria and urgency.   Musculoskeletal: Negative for arthralgias, joint swelling and myalgias.   Skin: Negative for rash.   Allergic/Immunologic: Negative for food allergies and immunocompromised state.   Neurological: Negative for dizziness, seizures, syncope and headaches.   Hematological: Negative for adenopathy. Does not bruise/bleed easily.   Psychiatric/Behavioral: Negative for dysphoric mood. The patient is not nervous/anxious and is not hyperactive.      Patient Active Problem List   Diagnosis   • Essential hypertension   • Chronic neck pain   • Allergic rhinitis due to allergen   • Diarrhea   • Colon cancer screening   • Chronic pain of left knee     Past Medical History:   Diagnosis Date   • Body piercing     EARS   • Hay fever     REPORTS \"WHEN I LIVED IN [place] IN [place] AND [place]\"   • Hepatitis C    • Hepatitis C    • Hepatitis C    • History of bronchitis    • History of transfusion     REPORTS SHE THINKS SHE HAS HAD A TRANSFUSION IN THE PAST BUT IS NOT CERTAIN. DID REPORTS IF SHE HAD A TRANSFUSION THAT THER IS NO KNOWN REACTION   • Hypertension    • Seasonal allergies    • Wears glasses     RX   • Wears partial dentures     REPORTS SHE WEARS A PARTIAL IN LOWER MOUTH - INSTRUCTED NO ADHESIVES THE DOS     Past Surgical History:   Procedure Laterality Date   • CATARACT EXTRACTION Left    • COLONOSCOPY     • COLONOSCOPY N/A 8/16/2018    Procedure: COLONOSCOPY hot snare polypectomy x2, saline injection, cold biopsies, tattoo injection, resolution clip x3;  Surgeon: Sen Morley MD;  Location: [facility] ENDOSCOPY;  Service: Gastroenterology   • JOINT REPLACEMENT Left     KNEE REPLACEMENT, THEN LATER HAD REVISION OF LEFT " "KNEE   • TONSILLECTOMY     • TUBAL ABDOMINAL LIGATION       Family History   Problem Relation Age of Onset   • Diabetes Mother    • Heart attack Mother    • Stroke Mother    • Diabetes Father    • Heart attack Father    • Stroke Father    • Hyperlipidemia Father    • Hypertension Father    • Colon polyps Brother    • Diabetes Brother    • Cancer Paternal Aunt         stomach   • Diabetes Sister      Social History     Tobacco Use   • Smoking status: Current Every Day Smoker     Packs/day: 0.50     Years: 50.00     Pack years: 25.00     Types: Cigarettes   • Smokeless tobacco: Never Used   • Tobacco comment: REPORTS SHE SMOKES \"OFF AND ON SINCE AGE 18\"   Substance Use Topics   • Alcohol use: No     Frequency: Never     Comment: SOCIAL USE, REPORTS NO HX OF ETOH ABUSE. QUIT 12/2018       Current Outpatient Medications:   •  aspirin 81 MG EC tablet, Take 1 tablet by mouth Daily., Disp: 30 tablet, Rfl: 5  •  cetirizine (zyrTEC) 10 MG tablet, Take 1 tablet by mouth Daily As Needed for Allergies., Disp: 30 tablet, Rfl: 0  •  HYDROcodone-acetaminophen (NORCO)  MG per tablet, Norco 10 mg-325 mg tablet  1 PO TID-QID PRN PAIN TO LAST FOR 30 DAYS, Disp: , Rfl:   •  lisinopril (PRINIVIL,ZESTRIL) 20 MG tablet, Take 1 tablet by mouth Daily., Disp: 30 tablet, Rfl: 5    No Known Allergies    Blood pressure 128/75, pulse 71, resp. rate 18, height 157.5 cm (62\"), weight 49 kg (108 lb), not currently breastfeeding.    Physical Exam   Constitutional: She is oriented to person, place, and time. She appears well-developed and well-nourished. No distress.   HENT:   Head: Normocephalic and atraumatic.   Right Ear: Hearing and external ear normal.   Left Ear: Hearing and external ear normal.   Nose: Nose normal.   Mouth/Throat: Oropharynx is clear and moist and mucous membranes are normal. Mucous membranes are not pale, not dry and not cyanotic. No oral lesions. No oropharyngeal exudate.   Eyes: Conjunctivae and EOM are normal. Right " eye exhibits no discharge. Left eye exhibits no discharge. No scleral icterus.   Neck: Trachea normal. Neck supple. No JVD present. No edema present. No thyroid mass and no thyromegaly present.   Cardiovascular: Normal rate, regular rhythm, S2 normal and normal heart sounds. Exam reveals no gallop, no S3 and no friction rub.   No murmur heard.  Pulmonary/Chest: Effort normal and breath sounds normal. No respiratory distress. She has no wheezes. She has no rales. She exhibits no tenderness.   Abdominal: Soft. Normal appearance and bowel sounds are normal. She exhibits no distension, no ascites and no mass. There is no splenomegaly or hepatomegaly. There is no tenderness. There is no rigidity, no rebound and no guarding. No hernia.   Musculoskeletal: She exhibits no tenderness or deformity.     Vascular Status -  Her right foot exhibits no edema. Her left foot exhibits no edema.  Lymphadenopathy:     She has no cervical adenopathy.        Left: No supraclavicular adenopathy present.   Neurological: She is alert and oriented to person, place, and time. She has normal strength. No cranial nerve deficit or sensory deficit. She exhibits normal muscle tone. Coordination normal.   Skin: No rash noted. She is not diaphoretic. No cyanosis. No pallor. Nails show no clubbing.   Psychiatric: She has a normal mood and affect. Her behavior is normal. Judgment and thought content normal.   Nursing note and vitals reviewed.  Stigmata of chronic liver disease:  None.  Asterixis:  None.    Laboratory Testing:  Upon review of medical records:    Dated July 13, 2018 sodium 140 potassium 4.2 chloride 105 CO2 27 BUN 11 serum creatinine 0.80 glucose 88.  Calcium 9.8.  Total protein 8.1.  Albumin 4.10.  T bili 0.8 AST 41 ALT 33 alkaline phosphatase 75.  Total cholesterol 142.  Triglycerides 67.  Hepatitis A IgM negative.  Hepatitis B surface antigen negative.  Hepatitis B core IgM negative.  Hepatitis C virus antibody positive at >11.0.      Dated July 18, 2018 HCV by PCR quantitative 2,700,000 IU/mL.  6.431 log 10 IU/mL.      Dated September 27, 2018 sodium 142 potassium 4.1 chloride 107 CO2 28 BUN 11 serum creatinine 0.80 glucose 89.  Calcium 9.9.  Total protein 9.0.  Albumin 4.70.  T bili 0.4 AST 41 ALT 48 alkaline phosphatase 102.  Serum iron 87.  TIBC 375.  Iron saturation 23%.  Ferritin 339.00.  Alpha-1 antitrypsin level 185.  Phenotype MM.  DAE direct positive.  Smooth muscle antibody negative at 16.  Mitochondrial antibody negative at <20.0.  Hepatitis A IgM negative.  Hepatitis A total antibody negative.  Hepatitis B core IgM negative.  Hepatitis B core total antibody positive.  Hepatitis B surface antibody reactive.  Hepatitis B surface antigen negative.     Dated January 31, 2019 HCV Fibrosure: Fibrosis score 0.64 (elevated).  Fibrosis stage F3-bridging fibrosis with many septa.  Necroinflammatory activity score elevated at 0.23, activity grade A0 to A1.  Alpha-2-macroglobulin quantitative elevated at 344.  Haptoglobin 41.  GGT elevated at 85.Apolipoprotein A-1 level at 197.     Dated April 2, 2019 TSH 0.485.  Vitamin B12 level 375.    Dated April 15, 2019 WBC 6.69 hemoglobin 15.4 hematocrit 45.9 MCV 88.3 platelet count 211.  PT 13.3.  INR 0.98.  PTT 33.2.     Dated April 30, 2019 sodium 135 potassium 4.0 chloride 106 CO2 27 BUN 10 serum creatinine 0.90 glucose 91.  Calcium 9.6.  Total protein 8.1.  Albumin 4.10.  T bili 0.5 AST 41 ALT 31 alkaline phosphatase 87.  HCVRNA by PCR quantitative 132, 000 IU/mL.  5.121 log 10 IU/mL.  Genotype 1b.     Dated June 11, 2019 sodium 141 potassium 4.1 chloride 105 CO2 30 BUN 12 serum creatinine 0.90 glucose 97.  Calcium 9.5.  Total protein 8.4.  Albumin 4.20.  T bili 0.4 AST 22 ALT 15 alkaline phosphatase 79.  WBC 7.39 hemoglobin 14.5 hematocrit 43.4 MCV 88.4 and platelet count 176.  Question  Dated July 16, 2019 sodium 142 potassium 4.0 chloride 108 CO2 26 BUN 14 serum creatinine 0.80 glucose 82.   Calcium 9.6.  Total protein 7.9.  Albumin 4.00.  T bili 0.3 AST 24 ALT 21 alkaline phosphatase 97.  Total cholesterol 157.  Triglycerides 91.  WBC 8.14 hemoglobin 14.3 hematocrit 43.0 MCV 89.0 platelet count 192.    Dated August 8, 2019 HCV RNA by PCR, Quantitative:  HCV not detected.     Abdominal Imaging:  Upon review of medical records:     Dated April 15, 2019 the patient underwent a CT-guided liver biopsy which revealed: Mild chronic hepatitis (grade 1/4, stage I to early 2/4).  Negative for malignancy. Sections in the current case showed chronic hepatitis that is fairly mild.  The portal areas are expanded by chronic inflammatory infiltrate, but interface hepatitis, piecemeal necrosis, and overt hepatocyte destruction are not negligible.  There is mild fibrosis (early oneal-portal/portal-portal septae) in one area, but discrete nodular architectural distortion and definite sclerosis are not identified in the specimen.  This is further evidenced by the trichrome stain, which shows only expanded portal areas and one single portal area with early portal septae.  Iron is present within the iron stain (overall mild).     Procedures:  Upon review of medical records:     Dated August 16, 2018 the patient underwent a colonoscopy to the terminal ileum which revealed: Scant diverticulosis.  Colon polyps.  2 were removed 10-12 mm in size.  Internal hemorrhoids.  No endoscopic evidence of colitis was seen.  Random biopsies were obtained from the colon upon withdrawal of scope. Transverse colon polyp, hot snare, biopsy revealed tubular adenoma.  Negative for high-grade dysplasia or malignancy.  Ascending colon, proximal polypoid area and cecal junction, biopsy revealed benign colonic mucosa with an intramucosal lymphoid aggregate. Negative for dysplasia or malignancy.  Hepatic flexure polyp, hot snare, biopsy revealed fragments of tubular adenoma. Negative for high-grade dysplasia or malignancy. Random colon, biopsies  revealed benign colonic mucosa with no diagnostic abdomen abnormalities.  Negative for chronic or active colitis including lymphocytic or collagen is colitis. Negative for dysplasia or malignancy.    Assessment:      ICD-10-CM ICD-9-CM   1. Chronic hepatitis C without hepatic coma (CMS/HCC) B18.2 070.54         Discussion:  1. The patient will continue to have hepatitis C antibody positive.  The patient can potentially contract hepatitis C again.    Plan/  Patient Instructions     1. Avoid alcohol.   2. The patient was counseled for smoking cessation (Smoking Cessation Counseling asymptomatic//3 minutes).  3. Follow-up in 6 months with HCVRNA by PCR quantitative and CMP.  4. Precautions regarding hepatitis C reinfection.       Sen Morley MD

## 2019-08-13 NOTE — PATIENT INSTRUCTIONS
1. Avoid alcohol.   2. The patient was counseled for smoking cessation (Smoking Cessation Counseling asymptomatic//3 minutes).  3. Follow-up in 6 months with HCVRNA by PCR quantitative and CMP.  4. Precautions regarding hepatitis C reinfection.

## 2019-08-27 RX ORDER — CETIRIZINE HYDROCHLORIDE 10 MG/1
10 TABLET ORAL DAILY PRN
Qty: 30 TABLET | Refills: 0 | Status: SHIPPED | OUTPATIENT
Start: 2019-08-27 | End: 2019-09-27 | Stop reason: SDUPTHER

## 2019-09-18 DIAGNOSIS — I10 ESSENTIAL HYPERTENSION: ICD-10-CM

## 2019-09-18 RX ORDER — LISINOPRIL 20 MG/1
20 TABLET ORAL DAILY
Qty: 30 TABLET | Refills: 5 | Status: SHIPPED | OUTPATIENT
Start: 2019-09-18 | End: 2019-10-08

## 2019-09-27 RX ORDER — CETIRIZINE HYDROCHLORIDE 10 MG/1
10 TABLET ORAL DAILY PRN
Qty: 30 TABLET | Refills: 0 | Status: SHIPPED | OUTPATIENT
Start: 2019-09-27 | End: 2020-03-23 | Stop reason: SDUPTHER

## 2019-10-08 ENCOUNTER — OFFICE VISIT (OUTPATIENT)
Dept: INTERNAL MEDICINE | Facility: CLINIC | Age: 69
End: 2019-10-08

## 2019-10-08 VITALS
BODY MASS INDEX: 20.24 KG/M2 | OXYGEN SATURATION: 100 % | HEIGHT: 62 IN | DIASTOLIC BLOOD PRESSURE: 58 MMHG | WEIGHT: 110 LBS | SYSTOLIC BLOOD PRESSURE: 138 MMHG | HEART RATE: 52 BPM | TEMPERATURE: 97.2 F

## 2019-10-08 DIAGNOSIS — I10 ESSENTIAL HYPERTENSION: Primary | ICD-10-CM

## 2019-10-08 DIAGNOSIS — L65.9 ALOPECIA: ICD-10-CM

## 2019-10-08 PROCEDURE — 99214 OFFICE O/P EST MOD 30 MIN: CPT | Performed by: INTERNAL MEDICINE

## 2019-10-08 RX ORDER — MINOXIDIL 10 MG/1
10 TABLET ORAL DAILY
Qty: 30 TABLET | Refills: 2 | Status: SHIPPED | OUTPATIENT
Start: 2019-10-08 | End: 2020-04-15 | Stop reason: SDDI

## 2019-10-08 NOTE — PROGRESS NOTES
"Chief Complaint   Patient presents with   • Hypertension     Discuss lisnopril and hair loss       Subjective     History of Present Illness   Mya Hawkins is a 69 y.o. female presenting for follow up.  Patient shares specific concerns about hair loss related to use of her lisinopril.  She is confident that since she started that medication at higher doses, he has been losing hair particularly on the top of her head and along the posterior sides.  She requests to try alternative medications.  As far as her hepatitis C treatment, she has completed therapy and is feeling well.    The following portions of the patient's history were reviewed and updated as appropriate: allergies, current medications, past family history, past medical history, past social history, past surgical history and problem list.    Review of Systems   Constitutional: Negative for chills, fatigue and fever.   HENT: Negative for congestion, ear pain, rhinorrhea, sinus pressure and sore throat.    Eyes: Negative for visual disturbance.   Respiratory: Negative for cough, chest tightness, shortness of breath and wheezing.    Cardiovascular: Negative for chest pain, palpitations and leg swelling.   Gastrointestinal: Negative for abdominal pain, blood in stool, constipation, diarrhea, nausea and vomiting.   Endocrine: Negative for polydipsia and polyuria.   Genitourinary: Negative for dysuria and hematuria.   Musculoskeletal: Negative for arthralgias and back pain.   Skin: Negative for rash.   Neurological: Negative for dizziness, light-headedness, numbness and headaches.   Psychiatric/Behavioral: Negative for dysphoric mood and sleep disturbance. The patient is not nervous/anxious.        No Known Allergies    Past Medical History:   Diagnosis Date   • Body piercing     EARS   • Hay fever     REPORTS \"WHEN I LIVED IN [place] IN [place]\"   • Hepatitis C    • Hepatitis C    • Hepatitis C    • History of bronchitis    • " "History of transfusion     REPORTS SHE THINKS SHE HAS HAD A TRANSFUSION IN THE PAST BUT IS NOT CERTAIN. DID REPORTS IF SHE HAD A TRANSFUSION THAT THER IS NO KNOWN REACTION   • Hypertension    • Seasonal allergies    • Wears glasses     RX   • Wears partial dentures     REPORTS SHE WEARS A PARTIAL IN LOWER MOUTH - INSTRUCTED NO ADHESIVES THE DOS       Social History     Socioeconomic History   • Marital status:      Spouse name: Not on file   • Number of children: Not on file   • Years of education: Not on file   • Highest education level: Not on file   Tobacco Use   • Smoking status: Current Every Day Smoker     Packs/day: 0.50     Years: 50.00     Pack years: 25.00     Types: Cigarettes   • Smokeless tobacco: Never Used   • Tobacco comment: REPORTS SHE SMOKES \"OFF AND ON SINCE AGE 18\"   Substance and Sexual Activity   • Alcohol use: No     Frequency: Never     Comment: SOCIAL USE, REPORTS NO HX OF ETOH ABUSE. QUIT 12/2018   • Drug use: No   • Sexual activity: Defer        Past Surgical History:   Procedure Laterality Date   • CATARACT EXTRACTION Left    • COLONOSCOPY     • COLONOSCOPY N/A 8/16/2018    Procedure: COLONOSCOPY hot snare polypectomy x2, saline injection, cold biopsies, tattoo injection, resolution clip x3;  Surgeon: Sen Morley MD;  Location: [facility] ENDOSCOPY;  Service: Gastroenterology   • JOINT REPLACEMENT Left     KNEE REPLACEMENT, THEN LATER HAD REVISION OF LEFT KNEE   • TONSILLECTOMY     • TUBAL ABDOMINAL LIGATION         Family History   Problem Relation Age of Onset   • Diabetes Mother    • Heart attack Mother    • Stroke Mother    • Diabetes Father    • Heart attack Father    • Stroke Father    • Hyperlipidemia Father    • Hypertension Father    • Colon polyps Brother    • Diabetes Brother    • Cancer Paternal Aunt         stomach   • Diabetes Sister          Current Outpatient Medications:   •  aspirin 81 MG EC tablet, Take 1 tablet by mouth Daily., Disp: 30 tablet, Rfl: 5  •  " "cetirizine (zyrTEC) 10 MG tablet, TAKE 1 TABLET BY MOUTH DAILY AS NEEDED FOR ALLERGIES., Disp: 30 tablet, Rfl: 0  •  HYDROcodone-acetaminophen (NORCO)  MG per tablet, Norco 10 mg-325 mg tablet  1 PO TID-QID PRN PAIN TO LAST FOR 30 DAYS, Disp: , Rfl:   •  minoxidil (LONITEN) 10 MG tablet, Take 1 tablet by mouth Daily. Take 1/2 pill QD for one week, then take whole pill QD, Disp: 30 tablet, Rfl: 2    Objective   /58 Comment: Automatic  Pulse 52   Temp 97.2 °F (36.2 °C) (Temporal)   Ht 157.5 cm (62\")   Wt 49.9 kg (110 lb)   LMP  (LMP Unknown)   SpO2 100%   BMI 20.12 kg/m²     Physical Exam   Constitutional: She is oriented to person, place, and time. She appears well-developed and well-nourished.   HENT:   Head: Normocephalic and atraumatic.   Eyes: Conjunctivae are normal.   Neck: Normal range of motion. Neck supple.   Pulmonary/Chest: Effort normal.   Musculoskeletal: Normal range of motion.   Neurological: She is alert and oriented to person, place, and time.   Skin: No rash noted.   Thinning hair on scalp    Psychiatric: She has a normal mood and affect. Her behavior is normal.   Nursing note and vitals reviewed.      Assessment/Plan   Mya was seen today for hypertension.    Diagnoses and all orders for this visit:    Essential hypertension  -     minoxidil (LONITEN) 10 MG tablet; Take 1 tablet by mouth Daily. Take 1/2 pill QD for one week, then take whole pill QD    Alopecia          Discussion Summary:  Patient is a 69 y.o. female presenting for follow up.    1.  Essential hypertension  -Stop lisinopril.  Start minoxidil.  Patient to monitor blood pressure at home.  She will start with 5 mg daily for 1 week and then go up to 10 mg daily.  Should blood pressure remain high after this, she may contact the clinic.    2.   alopecia  - Minoxidil should help with hair growth.  Will monitor.    Follow up:  No Follow-up on file.     "

## 2019-10-08 NOTE — PATIENT INSTRUCTIONS
Minoxidil tablets  What is this medicine?  MINOXIDIL (mi NOX i dill) is a type of vasodilator. It relaxes blood vessels, increasing the blood and oxygen supply to your heart. This medicine is used to treat high blood pressure. It is not recommended for use in mild cases of high blood pressure because of the potential for serious side effects.  This medicine may be used for other purposes; ask your health care provider or pharmacist if you have questions.  COMMON BRAND NAME(S): Shazia  What should I tell my health care provider before I take this medicine?  They need to know if you have any of these conditions:  -angina  -heart or blood vessel disease  -kidney disease  -lung disease  -pheochromocytoma  -previous heart attack  -an unusual or allergic reaction to minoxidil, other medicines, foods, dyes, or preservatives  -pregnant or trying to get pregnant  -breast-feeding  How should I use this medicine?  Take this medicine by mouth with a glass of water. Follow the directions on the prescription label. Take your doses at regular intervals. Do not take your medicine more often than directed. Do not stop taking except on the advice of your doctor or health care professional.  Talk to your pediatrician regarding the use of this medicine in children. Special care may be needed. While this medicine may be prescribed for children for selected conditions, precautions do apply.  Overdosage: If you think you have taken too much of this medicine contact a poison control center or emergency room at once.  NOTE: This medicine is only for you. Do not share this medicine with others.  What if I miss a dose?  If you miss a dose, take it as soon as you can. If it is almost time for your next dose, take only that dose. Do not take double or extra doses.  What may interact with this medicine?  -medicines for chest pain  -medicines for high blood pressure  This list may not describe all possible interactions. Give your health care  provider a list of all the medicines, herbs, non-prescription drugs, or dietary supplements you use. Also tell them if you smoke, drink alcohol, or use illegal drugs. Some items may interact with your medicine.  What should I watch for while using this medicine?  Check your heart rate and blood pressure regularly while you are taking this medicine. Ask your doctor or health care professional what your heart rate and blood pressure should be and when you should contact him or her. While you are taking this medicine, keep a check on your weight. Tell your doctor or health care professional if you rapidly gain more then 5 pounds.  You may get dizzy. Do not drive, use machinery, or do anything that needs mental alertness until you know how this medicine affects you. To reduce the risk of dizzy or fainting spells, do not sit or stand up quickly, especially if you are an older patient. Alcohol can make you more dizzy, and increase flushing and rapid heartbeats. Avoid alcoholic drinks.  Your mouth may get dry. Chewing sugarless gum or sucking hard candy, and drinking plenty of water may help. Contact your doctor if the problem does not go away or is severe.  Do not treat yourself for coughs, colds, or pain while you are taking this medicine without asking your doctor or health care professional for advice. Some ingredients may increase your blood pressure.  You may need to follow a special low sodium diet while taking this medicine. Check with your doctor or health care professional.  What side effects may I notice from receiving this medicine?  Side effects that you should report to your doctor or health care professional as soon as possible:  -chest pain, fast or irregular heartbeat, palpitations  -difficulty breathing  -dizziness or fainting spells  -redness, blistering, peeling or loosening of the skin, including inside the mouth  -skin rash or itching  -stiff or swollen joints  -sudden weight gain  -swelling of the  feet or legs  -unusual weakness  Side effects that usually do not require medical attention (report to your doctor or health care professional if they continue or are bothersome):  -headache  -unusual hair growth, on the face, arm, and back  This list may not describe all possible side effects. Call your doctor for medical advice about side effects. You may report side effects to FDA at 4-437-QPA-8518.  Where should I keep my medicine?  Keep out of the reach of children.  Store at room temperature between 20 and 25 degrees C (68 and 77 degrees F). Throw away any unused medicine after the expiration date.  NOTE: This sheet is a summary. It may not cover all possible information. If you have questions about this medicine, talk to your doctor, pharmacist, or health care provider.  © 2019 [employer] (2009-07-06 14:24:22)

## 2019-10-10 ENCOUNTER — TELEPHONE (OUTPATIENT)
Dept: INTERNAL MEDICINE | Facility: CLINIC | Age: 69
End: 2019-10-10

## 2019-10-10 DIAGNOSIS — I10 ESSENTIAL HYPERTENSION: Primary | ICD-10-CM

## 2019-10-10 RX ORDER — BLOOD PRESSURE TEST KIT-MEDIUM
KIT MISCELLANEOUS
Qty: 1 EACH | Refills: 0 | Status: SHIPPED | OUTPATIENT
Start: 2019-10-10 | End: 2021-03-03

## 2019-10-10 NOTE — TELEPHONE ENCOUNTER
patient is requesting a prescription for a blood pressure monitor.  The one she has been using is broken.  Please advise

## 2019-10-18 ENCOUNTER — OFFICE VISIT (OUTPATIENT)
Dept: INTERNAL MEDICINE | Facility: CLINIC | Age: 69
End: 2019-10-18

## 2019-10-18 VITALS
HEIGHT: 62 IN | OXYGEN SATURATION: 97 % | DIASTOLIC BLOOD PRESSURE: 58 MMHG | HEART RATE: 71 BPM | TEMPERATURE: 98.1 F | WEIGHT: 115 LBS | SYSTOLIC BLOOD PRESSURE: 115 MMHG | BODY MASS INDEX: 21.16 KG/M2

## 2019-10-18 DIAGNOSIS — N94.9 VAGINAL BURNING: ICD-10-CM

## 2019-10-18 DIAGNOSIS — Z20.2 POSSIBLE EXPOSURE TO STD: Primary | ICD-10-CM

## 2019-10-18 LAB
BILIRUB BLD-MCNC: NEGATIVE MG/DL
CLARITY, POC: CLEAR
COLOR UR: YELLOW
GLUCOSE UR STRIP-MCNC: NEGATIVE MG/DL
KETONES UR QL: NEGATIVE
LEUKOCYTE EST, POC: NEGATIVE
NITRITE UR-MCNC: NEGATIVE MG/ML
PH UR: 5.5 [PH] (ref 5–8)
PROT UR STRIP-MCNC: NEGATIVE MG/DL
RBC # UR STRIP: NEGATIVE /UL
SP GR UR: 1.02 (ref 1–1.03)
UROBILINOGEN UR QL: NORMAL

## 2019-10-18 PROCEDURE — 81003 URINALYSIS AUTO W/O SCOPE: CPT | Performed by: FAMILY MEDICINE

## 2019-10-18 PROCEDURE — 99213 OFFICE O/P EST LOW 20 MIN: CPT | Performed by: FAMILY MEDICINE

## 2019-10-18 RX ORDER — FLUCONAZOLE 150 MG/1
150 TABLET ORAL ONCE
Qty: 1 TABLET | Refills: 0 | Status: SHIPPED | OUTPATIENT
Start: 2019-10-18 | End: 2019-10-18

## 2019-10-18 NOTE — PROGRESS NOTES
"Mya Hawkins is a 69 y.o. female.    Chief Complaint   Patient presents with   • Follow-up     vaginal burning with discharge       HPI   Patient is concerned about a possible STD.  She reports she did notice some vaginal discharge and some vaginal burning.  Patient reports that her partner had some symptoms of a possible STD and is encouraged her to be checked as well.  She denies any abnormal vaginal bleeding.      The following portions of the patient's history were reviewed and updated as appropriate: allergies, current medications, past family history, past medical history, past social history, past surgical history and problem list.     No Known Allergies      Current Outpatient Medications:   •  aspirin 81 MG EC tablet, Take 1 tablet by mouth Daily., Disp: 30 tablet, Rfl: 5  •  cetirizine (zyrTEC) 10 MG tablet, TAKE 1 TABLET BY MOUTH DAILY AS NEEDED FOR ALLERGIES., Disp: 30 tablet, Rfl: 0  •  HYDROcodone-acetaminophen (NORCO)  MG per tablet, Norco 10 mg-325 mg tablet  1 PO TID-QID PRN PAIN TO LAST FOR 30 DAYS, Disp: , Rfl:   •  minoxidil (LONITEN) 10 MG tablet, Take 1 tablet by mouth Daily. Take 1/2 pill QD for one week, then take whole pill QD, Disp: 30 tablet, Rfl: 2  •  Blood Pressure Monitor device, For Daily blood pressure measurements., Disp: 1 each, Rfl: 0    ROS    Review of Systems   Respiratory: Negative for shortness of breath.    Cardiovascular: Negative for chest pain.   Gastrointestinal: Negative for abdominal pain, constipation, diarrhea, nausea and vomiting.   Genitourinary: Positive for vaginal discharge and vaginal pain (Burning). Negative for dysuria, pelvic pain and vaginal bleeding.       Vitals:    10/18/19 1448   BP: 115/58   Pulse: 71   Temp: 98.1 °F (36.7 °C)   SpO2: 97%   Weight: 52.2 kg (115 lb)   Height: 157.5 cm (62\")     Body mass index is 21.03 kg/m².    Physical Exam     Physical Exam   Constitutional: She is oriented to person, place, and time. She appears " well-developed and well-nourished. No distress.   HENT:   Head: Normocephalic and atraumatic.   Right Ear: External ear normal.   Left Ear: External ear normal.   Eyes: Conjunctivae and EOM are normal.   Cardiovascular: Normal rate and regular rhythm.   No murmur heard.  Pulmonary/Chest: Effort normal and breath sounds normal. No respiratory distress. She has no wheezes.   Abdominal: Soft. Bowel sounds are normal. She exhibits no distension. There is no tenderness.   Genitourinary: There is no rash or lesion on the right labia. There is no rash or lesion on the left labia.   Neurological: She is alert and oriented to person, place, and time. No cranial nerve deficit.   Skin: Skin is warm and dry.   Psychiatric: She has a normal mood and affect. Her behavior is normal.       Assessment/Plan    Problem List Items Addressed This Visit        Nervous and Auditory    Vaginal burning     We will go ahead and treat for vaginal candidiasis.  Patient has been given Diflucan.         Relevant Orders    POCT urinalysis dipstick, automated (Completed)       Other    Possible exposure to STD - Primary     Vaginal swab has been obtained to check for STDs.  Will await test results for further treatment.         Relevant Orders    NuSwab VG+ & HSV (Completed)          New Medications Ordered This Visit   Medications   • fluconazole (DIFLUCAN) 150 MG tablet     Sig: Take 1 tablet by mouth 1 (One) Time for 1 dose.     Dispense:  1 tablet     Refill:  0       No orders of the defined types were placed in this encounter.      Return if symptoms worsen or fail to improve.    Estee Quach,

## 2019-10-22 ENCOUNTER — TELEPHONE (OUTPATIENT)
Dept: INTERNAL MEDICINE | Facility: CLINIC | Age: 69
End: 2019-10-22

## 2019-10-22 DIAGNOSIS — A54.9 GONORRHEA: Primary | ICD-10-CM

## 2019-10-22 LAB

## 2019-10-22 RX ORDER — CEFTRIAXONE 500 MG/1
500 INJECTION, POWDER, FOR SOLUTION INTRAMUSCULAR; INTRAVENOUS EVERY 24 HOURS
Status: COMPLETED | OUTPATIENT
Start: 2019-10-22 | End: 2019-10-23

## 2019-10-22 RX ORDER — METRONIDAZOLE 500 MG/1
2000 TABLET ORAL ONCE
Qty: 4 TABLET | Refills: 0 | Status: SHIPPED | OUTPATIENT
Start: 2019-10-22 | End: 2019-10-22

## 2019-10-22 RX ORDER — AZITHROMYCIN 500 MG/1
1000 TABLET, FILM COATED ORAL ONCE
Qty: 2 TABLET | Refills: 0 | Status: SHIPPED | OUTPATIENT
Start: 2019-10-22 | End: 2019-10-22

## 2019-10-22 NOTE — TELEPHONE ENCOUNTER
Patient is having dental work done tomorrow, and the dentist asked her to call her PCP to see if she needed to premedicate.  Please advise.  Phone number verified.

## 2019-10-23 ENCOUNTER — CLINICAL SUPPORT (OUTPATIENT)
Dept: INTERNAL MEDICINE | Facility: CLINIC | Age: 69
End: 2019-10-23

## 2019-10-23 PROCEDURE — 96372 THER/PROPH/DIAG INJ SC/IM: CPT | Performed by: FAMILY MEDICINE

## 2019-10-23 RX ADMIN — CEFTRIAXONE 500 MG: 500 INJECTION, POWDER, FOR SOLUTION INTRAMUSCULAR; INTRAVENOUS at 08:50

## 2019-10-26 PROBLEM — N94.89 VAGINAL BURNING: Status: ACTIVE | Noted: 2019-10-26

## 2019-10-26 PROBLEM — Z20.2 POSSIBLE EXPOSURE TO STD: Status: ACTIVE | Noted: 2019-10-26

## 2019-10-26 PROBLEM — N94.9 VAGINAL BURNING: Status: ACTIVE | Noted: 2019-10-26

## 2019-10-29 ENCOUNTER — TELEPHONE (OUTPATIENT)
Dept: INTERNAL MEDICINE | Facility: CLINIC | Age: 69
End: 2019-10-29

## 2019-10-29 DIAGNOSIS — I10 ESSENTIAL HYPERTENSION: Primary | ICD-10-CM

## 2019-10-29 RX ORDER — HYDROCHLOROTHIAZIDE 25 MG/1
25 TABLET ORAL DAILY
Qty: 30 TABLET | Refills: 2 | Status: SHIPPED | OUTPATIENT
Start: 2019-10-29 | End: 2020-01-10

## 2019-10-29 NOTE — TELEPHONE ENCOUNTER
Patient is having swelling in both feet since BP medication.  She has been taking minoxidil x 3 weeks.  Would you like to change this medication?

## 2019-10-30 ENCOUNTER — TELEPHONE (OUTPATIENT)
Dept: INTERNAL MEDICINE | Facility: CLINIC | Age: 69
End: 2019-10-30

## 2019-10-30 NOTE — TELEPHONE ENCOUNTER
Pt says doctor took her off the minoxidil and put her on Hydrochlorothiazide.  Pt is wanting to know if she should continue taking her Asprin 81mg with this medication change.  Pt is requesting a call back.

## 2019-11-25 ENCOUNTER — TELEPHONE (OUTPATIENT)
Dept: INTERNAL MEDICINE | Facility: CLINIC | Age: 69
End: 2019-11-25

## 2019-11-25 NOTE — TELEPHONE ENCOUNTER
Patient called stating that she was seen and treated for STD and wants to know if the meds she was prescribed was all she needed to do, or if she needs to be check again to make sure its cleared. Please advise.

## 2019-11-26 NOTE — TELEPHONE ENCOUNTER
Spoke with pt. She seems to think it has resolved. She stated that she will call and schedule an appointment if she wants to be retested. Thank you.

## 2019-11-26 NOTE — TELEPHONE ENCOUNTER
Pt called back to check the status of her situation with the STD, I advised we were were waiting for physician to let us know, but she is aware of her concern; Please advise

## 2019-11-26 NOTE — TELEPHONE ENCOUNTER
I was never routed this telephone encounter, so I was not aware of her concern as reported by the scheduling rep.  She should not need to do anything further.  If she is having symptoms then we can retest.

## 2020-01-10 ENCOUNTER — OFFICE VISIT (OUTPATIENT)
Dept: INTERNAL MEDICINE | Facility: CLINIC | Age: 70
End: 2020-01-10

## 2020-01-10 VITALS
SYSTOLIC BLOOD PRESSURE: 100 MMHG | DIASTOLIC BLOOD PRESSURE: 56 MMHG | HEIGHT: 62 IN | TEMPERATURE: 97.5 F | BODY MASS INDEX: 19.23 KG/M2 | HEART RATE: 59 BPM | OXYGEN SATURATION: 98 % | RESPIRATION RATE: 16 BRPM | WEIGHT: 104.5 LBS

## 2020-01-10 DIAGNOSIS — M25.561 CHRONIC PAIN OF BOTH KNEES: ICD-10-CM

## 2020-01-10 DIAGNOSIS — G89.29 CHRONIC PAIN OF BOTH KNEES: ICD-10-CM

## 2020-01-10 DIAGNOSIS — I10 ESSENTIAL HYPERTENSION: ICD-10-CM

## 2020-01-10 DIAGNOSIS — M25.562 CHRONIC PAIN OF BOTH KNEES: ICD-10-CM

## 2020-01-10 DIAGNOSIS — R63.4 WEIGHT LOSS, UNINTENTIONAL: Primary | ICD-10-CM

## 2020-01-10 PROCEDURE — 99214 OFFICE O/P EST MOD 30 MIN: CPT | Performed by: INTERNAL MEDICINE

## 2020-01-10 NOTE — PROGRESS NOTES
"Chief Complaint   Patient presents with   • Follow-up     knee is getting worse,       Subjective     History of Present Illness   Mya Hawkins is a 69 y.o. female presenting for follow up. She shares that her right knee pains are beginning to develop as she is compensating for her chronic left knee pains. Continues to follow with pain management.  Has some occasional dizziness as it is notable that her blood pressure has dropped as she has lost about 10 lbs since her last visit.  Has no specific reason for weight loss other than just lack of appetite.     The following portions of the patient's history were reviewed and updated as appropriate: allergies, current medications, past family history, past medical history, past social history, past surgical history and problem list.    Review of Systems   Constitutional: Negative for chills, fatigue and fever.   HENT: Negative for congestion, ear pain, rhinorrhea, sinus pressure and sore throat.    Eyes: Negative for visual disturbance.   Respiratory: Negative for cough, chest tightness, shortness of breath and wheezing.    Cardiovascular: Negative for chest pain, palpitations and leg swelling.   Gastrointestinal: Negative for abdominal pain, blood in stool, constipation, diarrhea, nausea and vomiting.   Endocrine: Negative for polydipsia and polyuria.   Genitourinary: Negative for dysuria and hematuria.   Musculoskeletal: Negative for arthralgias and back pain.   Skin: Negative for rash.   Neurological: Negative for dizziness, light-headedness, numbness and headaches.   Psychiatric/Behavioral: Negative for dysphoric mood and sleep disturbance. The patient is not nervous/anxious.        No Known Allergies    Past Medical History:   Diagnosis Date   • Body piercing     EARS   • Hay fever     REPORTS \"WHEN I LIVED IN [place] IN THE [place] AND THE [place]\"   • Hepatitis C    • Hepatitis C    • Hepatitis C    • History of bronchitis    • History of transfusion " "    REPORTS SHE THINKS SHE HAS HAD A TRANSFUSION IN THE PAST BUT IS NOT CERTAIN. DID REPORTS IF SHE HAD A TRANSFUSION THAT THER IS NO KNOWN REACTION   • Hypertension    • Seasonal allergies    • Wears glasses     RX   • Wears partial dentures     REPORTS SHE WEARS A PARTIAL IN LOWER MOUTH - INSTRUCTED NO ADHESIVES THE DOS       Social History     Socioeconomic History   • Marital status:      Spouse name: Not on file   • Number of children: Not on file   • Years of education: Not on file   • Highest education level: Not on file   Tobacco Use   • Smoking status: Current Every Day Smoker     Packs/day: 0.50     Years: 50.00     Pack years: 25.00     Types: Cigarettes   • Smokeless tobacco: Never Used   • Tobacco comment: REPORTS SHE SMOKES \"OFF AND ON SINCE AGE 18\"   Substance and Sexual Activity   • Alcohol use: No     Frequency: Never     Comment: SOCIAL USE, REPORTS NO HX OF ETOH ABUSE. QUIT 12/2018   • Drug use: No   • Sexual activity: Defer        Past Surgical History:   Procedure Laterality Date   • CATARACT EXTRACTION Left    • COLONOSCOPY     • COLONOSCOPY N/A 8/16/2018    Procedure: COLONOSCOPY hot snare polypectomy x2, saline injection, cold biopsies, tattoo injection, resolution clip x3;  Surgeon: Sen Morley MD;  Location: [facility];  Service: Gastroenterology   • JOINT REPLACEMENT Left     KNEE REPLACEMENT, THEN LATER HAD REVISION OF LEFT KNEE   • TONSILLECTOMY     • TUBAL ABDOMINAL LIGATION         Family History   Problem Relation Age of Onset   • Diabetes Mother    • Heart attack Mother    • Stroke Mother    • Diabetes Father    • Heart attack Father    • Stroke Father    • Hyperlipidemia Father    • Hypertension Father    • Colon polyps Brother    • Diabetes Brother    • Cancer Paternal Aunt         stomach   • Diabetes Sister          Current Outpatient Medications:   •  aspirin 81 MG EC tablet, Take 1 tablet by mouth Daily., Disp: 30 tablet, Rfl: 5  •  Blood Pressure Monitor " "device, For Daily blood pressure measurements., Disp: 1 each, Rfl: 0  •  cetirizine (zyrTEC) 10 MG tablet, TAKE 1 TABLET BY MOUTH DAILY AS NEEDED FOR ALLERGIES., Disp: 30 tablet, Rfl: 0  •  HYDROcodone-acetaminophen (NORCO)  MG per tablet, Norco 10 mg-325 mg tablet  1 PO TID-QID PRN PAIN TO LAST FOR 30 DAYS, Disp: , Rfl:   •  minoxidil (LONITEN) 10 MG tablet, Take 1 tablet by mouth Daily. Take 1/2 pill QD for one week, then take whole pill QD, Disp: 30 tablet, Rfl: 2    Objective   /56   Pulse 59   Temp 97.5 °F (36.4 °C)   Resp 16   Ht 157.5 cm (62\")   Wt 47.4 kg (104 lb 8 oz)   LMP  (LMP Unknown)   SpO2 98%   BMI 19.11 kg/m²     Physical Exam   Constitutional: She is oriented to person, place, and time. She appears well-developed and well-nourished.   HENT:   Head: Normocephalic and atraumatic.   Eyes: Conjunctivae are normal.   Neck: Normal range of motion. Neck supple.   Pulmonary/Chest: Effort normal.   Musculoskeletal: Normal range of motion.   Neurological: She is alert and oriented to person, place, and time.   Skin: No rash noted.   Psychiatric: She has a normal mood and affect. Her behavior is normal.   Nursing note and vitals reviewed.      Assessment/Plan   Mya was seen today for follow-up.    Diagnoses and all orders for this visit:    Weight loss, unintentional    Essential hypertension    Chronic pain of both knees          Discussion Summary:  Patient is a 69 y.o. female presenting for follow up.    Hypertension  - BP has been low.  Stop HCTZ, pt to monitor BP as she stops the medications.     Weight loss  - unclear cause, monitor over next 3 months.  Advised to exercise as tolerated and keep good PO intake.     knee pain  - following with pain management.     Follow up:  Return in about 3 months (around 4/10/2020) for Next scheduled follow up.     "

## 2020-02-19 NOTE — TELEPHONE ENCOUNTER
Pt is requesting a refill of hydrochlorothiazide 25 mgT and have it sent to [facility].

## 2020-02-19 NOTE — TELEPHONE ENCOUNTER
Last note states to d/c HCTZ and monitor b/p. I called pt to clarify and she states she has still been taking this PRN depending on her b/p reading. She is wanting to know if she should refill this and continue it or go back to lisinopril? States her readings have been up and down. Please advise?

## 2020-02-20 NOTE — TELEPHONE ENCOUNTER
Please call in HCTZ 12.5 mg daily.  She should take this daily.  This is half of the dose that she was getting before.  I would rather have her take medications on a regular basis to prevent ups and downs.

## 2020-02-20 NOTE — TELEPHONE ENCOUNTER
PT CALLED AND WANTED TO KNOW THE STATUS OF THE CHANGE IN HER BP MEDS; PLEASE CALL WHEN THIS HAS BEEN REVIEWED    JONAH: [phone]

## 2020-02-24 ENCOUNTER — OFFICE VISIT (OUTPATIENT)
Dept: GASTROENTEROLOGY | Facility: CLINIC | Age: 70
End: 2020-02-24

## 2020-02-24 VITALS
TEMPERATURE: 97.4 F | HEART RATE: 63 BPM | SYSTOLIC BLOOD PRESSURE: 128 MMHG | HEIGHT: 61 IN | DIASTOLIC BLOOD PRESSURE: 68 MMHG | WEIGHT: 114 LBS | BODY MASS INDEX: 21.52 KG/M2

## 2020-02-24 DIAGNOSIS — D12.3 ADENOMATOUS POLYP OF TRANSVERSE COLON: ICD-10-CM

## 2020-02-24 DIAGNOSIS — Z86.19 HISTORY OF HEPATITIS C: Primary | ICD-10-CM

## 2020-02-24 DIAGNOSIS — K76.9 CHRONIC LIVER DISEASE: ICD-10-CM

## 2020-02-24 PROCEDURE — 99214 OFFICE O/P EST MOD 30 MIN: CPT | Performed by: INTERNAL MEDICINE

## 2020-02-24 NOTE — PATIENT INSTRUCTIONS
1. Avoid alcohol.  2. The patient was counseled for smoking cessation (Smoking Cessation Counseling asymptomatic//3 minutes).  3. Follow-up in 6 months with HCVRNA by PCR quantitative and CMP.  4. Precautions regarding hepatitis C reinfection.  5. Follow-up: In July 2020 with CMP and HCVRNA by PCR quantitative.

## 2020-02-24 NOTE — PROGRESS NOTES
"Chief Complaint   Patient presents with   • Hepatic Disease     History of Present Illness     The patient has history of chronic hepatitis C diagnosed about 2 years ago. The patient had blood transfusion in 1980s. There is no history of drug use. The patient denies history of tattoos. Over the years the patient has history of significant alcohol use. She started drinking at age 18. The patient used to drink 4-5 drinks per day usually 4-5 times a week. She quit alcohol in 2008. Since then the patient has switched to \"nonalcoholic beer\". The patient was drinking 4-5 cans of nonalcoholic beer daily. She has finally quit nonalcoholic beer as well in December 2018. The patient has received first dose of vaccination against hepatitis A in October 2018. It appears that the patient had been exposed to hepatitis B in the past, got rid of it and is currently immune to it. She denies jaundice. There is no darkening of urine color, lightning of stool color or pruritus. Her hepatitis C genotype is 1b.  The patient has also been positive for DAE.  The patient has been treated with MAVYRET (GLECAPRAVIR 100 mg + PIBRANTASVIR 40 mg) for 8 weeks.  She finished the course on July 16, 2019.  Dated August 6, 2019 HCVRNA by PCR was not detectable (cutoff 15 IU /mL).     The patient denies further diarrheal symptoms. She is been having one stool a day. There is no nausea or vomiting. She denies reflux. There is no dysphagia or odynophagia.There is no history of overt GI bleed (Hematemesis, melena or hematochezia). The patient denies recent weight loss.    Review of Systems   Constitutional: Negative for appetite change, chills, fatigue, fever and unexpected weight change.   HENT: Negative for mouth sores, nosebleeds and trouble swallowing.    Eyes: Negative for discharge and redness.   Respiratory: Positive for cough. Negative for apnea and shortness of breath.    Cardiovascular: Negative for chest pain, palpitations and leg swelling. " "  Gastrointestinal: Negative for abdominal distention, abdominal pain, anal bleeding, blood in stool, constipation, diarrhea, nausea and vomiting.   Endocrine: Negative for cold intolerance, heat intolerance and polydipsia.   Genitourinary: Negative for dysuria, hematuria and urgency.   Musculoskeletal: Positive for arthralgias and back pain. Negative for joint swelling and myalgias.   Skin: Negative for rash.   Allergic/Immunologic: Positive for environmental allergies. Negative for food allergies and immunocompromised state.   Neurological: Negative for dizziness, seizures, syncope and headaches.   Hematological: Negative for adenopathy. Does not bruise/bleed easily.   Psychiatric/Behavioral: Negative for dysphoric mood. The patient is not nervous/anxious and is not hyperactive.      Patient Active Problem List   Diagnosis   • Essential hypertension   • Chronic neck pain   • Allergic rhinitis due to allergen   • Diarrhea   • Colon cancer screening   • Chronic pain of left knee   • Possible exposure to STD   • Vaginal burning     Past Medical History:   Diagnosis Date   • Body piercing     EARS   • Hay fever     REPORTS \"WHEN I LIVED IN [place] IN [place] AND [place]\"   • Hepatitis C    • History of bronchitis    • History of transfusion     REPORTS SHE THINKS SHE HAS HAD A TRANSFUSION IN THE PAST BUT IS NOT CERTAIN. DID REPORTS IF SHE HAD A TRANSFUSION THAT THER IS NO KNOWN REACTION   • Hypertension    • Seasonal allergies    • Wears glasses     RX   • Wears partial dentures     REPORTS SHE WEARS A PARTIAL IN LOWER MOUTH - INSTRUCTED NO ADHESIVES THE DOS     Past Surgical History:   Procedure Laterality Date   • CATARACT EXTRACTION Left    • COLONOSCOPY     • COLONOSCOPY N/A 8/16/2018    Procedure: COLONOSCOPY hot snare polypectomy x2, saline injection, cold biopsies, tattoo injection, resolution clip x3;  Surgeon: Sen Morley MD;  Location: [facility] ENDOSCOPY;  Service: Gastroenterology   • JOINT " "REPLACEMENT Left     KNEE REPLACEMENT, THEN LATER HAD REVISION OF LEFT KNEE   • TONSILLECTOMY     • TUBAL ABDOMINAL LIGATION       Family History   Problem Relation Age of Onset   • Diabetes Mother    • Heart attack Mother    • Stroke Mother    • Diabetes Father    • Heart attack Father    • Stroke Father    • Hyperlipidemia Father    • Hypertension Father    • Colon polyps Brother    • Diabetes Brother    • Cancer Paternal Aunt         stomach   • Diabetes Sister    • Colon cancer Neg Hx      Social History     Tobacco Use   • Smoking status: Current Every Day Smoker     Packs/day: 0.50     Years: 50.00     Pack years: 25.00     Types: Cigarettes   • Smokeless tobacco: Never Used   • Tobacco comment: REPORTS SHE SMOKES \"OFF AND ON SINCE AGE 18\"   Substance Use Topics   • Alcohol use: No     Frequency: Never     Comment: SOCIAL USE, REPORTS NO HX OF ETOH ABUSE. QUIT 12/2018       Current Outpatient Medications:   •  aspirin 81 MG EC tablet, Take 1 tablet by mouth Daily., Disp: 30 tablet, Rfl: 5  •  Blood Pressure Monitor device, For Daily blood pressure measurements., Disp: 1 each, Rfl: 0  •  cetirizine (zyrTEC) 10 MG tablet, TAKE 1 TABLET BY MOUTH DAILY AS NEEDED FOR ALLERGIES., Disp: 30 tablet, Rfl: 0  •  HYDROCHLOROTHIAZIDE PO, Take 0.5 tablets by mouth Daily As Needed., Disp: , Rfl:   •  HYDROcodone-acetaminophen (NORCO)  MG per tablet, Norco 10 mg-325 mg tablet  1 PO TID-QID PRN PAIN TO LAST FOR 30 DAYS, Disp: , Rfl:   •  minoxidil (LONITEN) 10 MG tablet, Take 1 tablet by mouth Daily. Take 1/2 pill QD for one week, then take whole pill QD, Disp: 30 tablet, Rfl: 2    No Known Allergies    Blood pressure 128/68, pulse 63, temperature 97.4 °F (36.3 °C), height 154.9 cm (61\"), weight 51.7 kg (114 lb), not currently breastfeeding.    Physical Exam   Constitutional: She is oriented to person, place, and time. She appears well-developed and well-nourished. No distress.   HENT:   Head: Normocephalic and " atraumatic.   Right Ear: Hearing and external ear normal.   Left Ear: Hearing and external ear normal.   Nose: Nose normal.   Mouth/Throat: Oropharynx is clear and moist and mucous membranes are normal. Mucous membranes are not pale, not dry and not cyanotic. No oral lesions. No oropharyngeal exudate.   Eyes: Conjunctivae and EOM are normal. Right eye exhibits no discharge. Left eye exhibits no discharge. No scleral icterus.   Neck: Trachea normal. Neck supple. No JVD present. No edema present. No thyroid mass and no thyromegaly present.   Cardiovascular: Normal rate, regular rhythm, S2 normal and normal heart sounds. Exam reveals no gallop, no S3 and no friction rub.   No murmur heard.  Pulmonary/Chest: Effort normal and breath sounds normal. No respiratory distress. She has no wheezes. She has no rales. She exhibits no tenderness.   Abdominal: Soft. Normal appearance and bowel sounds are normal. She exhibits no distension, no ascites and no mass. There is no splenomegaly or hepatomegaly. There is no tenderness. There is no rigidity, no rebound and no guarding. No hernia.   Musculoskeletal: She exhibits no tenderness or deformity.     Vascular Status -  Her right foot exhibits no edema. Her left foot exhibits no edema.  Lymphadenopathy:     She has no cervical adenopathy.        Left: No supraclavicular adenopathy present.   Neurological: She is alert and oriented to person, place, and time. She has normal strength. No cranial nerve deficit or sensory deficit. She exhibits normal muscle tone. Coordination normal.   Skin: No rash noted. She is not diaphoretic. No cyanosis. No pallor. Nails show no clubbing.   Psychiatric: She has a normal mood and affect. Her behavior is normal. Judgment and thought content normal.   Nursing note and vitals reviewed.  Stigmata of chronic liver disease:  None.  Asterixis:  None.    Laboratory Testing:  Upon review of medical records:    Dated July 13, 2018 sodium 140 potassium 4.2  chloride 105 CO2 27 BUN 11 serum creatinine 0.80 glucose 88.  Calcium 9.8.  Total protein 8.1.  Albumin 4.10.  T bili 0.8 AST 41 ALT 33 alkaline phosphatase 75.  Total cholesterol 142.  Triglycerides 67.  Hepatitis A IgM negative.  Hepatitis B surface antigen negative.  Hepatitis B core IgM negative.  Hepatitis C virus antibody positive at >11.0.     Dated July 18, 2018 HCV by PCR quantitative 2,700,000 IU/mL.  6.431 log 10 IU/mL.      Dated September 27, 2018 sodium 142 potassium 4.1 chloride 107 CO2 28 BUN 11 serum creatinine 0.80 glucose 89.  Calcium 9.9.  Total protein 9.0.  Albumin 4.70.  T bili 0.4 AST 41 ALT 48 alkaline phosphatase 102.  Serum iron 87.  TIBC 375.  Iron saturation 23%.  Ferritin 339.00.  Alpha-1 antitrypsin level 185.  Phenotype MM.  DAE direct positive.  Smooth muscle antibody negative at 16.  Mitochondrial antibody negative at <20.0.  Hepatitis A IgM negative.  Hepatitis A total antibody negative.  Hepatitis B core IgM negative.  Hepatitis B core total antibody positive.  Hepatitis B surface antibody reactive.  Hepatitis B surface antigen negative.     Dated January 31, 2019 HCV Fibrosure: Fibrosis score 0.64 (elevated).  Fibrosis stage F3-bridging fibrosis with many septa.  Necroinflammatory activity score elevated at 0.23, activity grade A0 to A1.  Alpha-2-macroglobulin quantitative elevated at 344.  Haptoglobin 41.  GGT elevated at 85.Apolipoprotein A-1 level at 197.     Dated April 2, 2019 TSH 0.485.  Vitamin B12 level 375.    Dated April 15, 2019 WBC 6.69 hemoglobin 15.4 hematocrit 45.9 MCV 88.3 platelet count 211.  PT 13.3.  INR 0.98.  PTT 33.2.     Dated April 30, 2019 sodium 135 potassium 4.0 chloride 106 CO2 27 BUN 10 serum creatinine 0.90 glucose 91.  Calcium 9.6.  Total protein 8.1.  Albumin 4.10.  T bili 0.5 AST 41 ALT 31 alkaline phosphatase 87.  HCVRNA by PCR quantitative 132, 000 IU/mL.  5.121 log 10 IU/mL.  Genotype 1b.     Dated June 11, 2019 sodium 141 potassium 4.1  chloride 105 CO2 30 BUN 12 serum creatinine 0.90 glucose 97.  Calcium 9.5.  Total protein 8.4.  Albumin 4.20.  T bili 0.4 AST 22 ALT 15 alkaline phosphatase 79.  WBC 7.39 hemoglobin 14.5 hematocrit 43.4 MCV 88.4 and platelet count 176.  Question  Dated July 16, 2019 sodium 142 potassium 4.0 chloride 108 CO2 26 BUN 14 serum creatinine 0.80 glucose 82.  Calcium 9.6.  Total protein 7.9.  Albumin 4.00.  T bili 0.3 AST 24 ALT 21 alkaline phosphatase 97.  Total cholesterol 157.  Triglycerides 91.  WBC 8.14 hemoglobin 14.3 hematocrit 43.0 MCV 89.0 platelet count 192.     Dated August 8, 2019 HCV RNA by PCR, Quantitative:  HCV not detected.     Abdominal Imaging:  Upon review of medical records:     Dated April 15, 2019 the patient underwent a CT-guided liver biopsy which revealed: Mild chronic hepatitis (grade 1/4, stage I to early 2/4).  Negative for malignancy. Sections in the current case showed chronic hepatitis that is fairly mild.  The portal areas are expanded by chronic inflammatory infiltrate, but interface hepatitis, piecemeal necrosis, and overt hepatocyte destruction are not negligible.  There is mild fibrosis (early oneal-portal/portal-portal septae) in one area, but discrete nodular architectural distortion and definite sclerosis are not identified in the specimen.  This is further evidenced by the trichrome stain, which shows only expanded portal areas and one single portal area with early portal septae.  Iron is present within the iron stain (overall mild).     Procedures:  Upon review of medical records:     Dated August 16, 2018 the patient underwent a colonoscopy to the terminal ileum which revealed: Scant diverticulosis.  Colon polyps.  2 were removed 10-12 mm in size.  Internal hemorrhoids.  No endoscopic evidence of colitis was seen.  Random biopsies were obtained from the colon upon withdrawal of scope. Transverse colon polyp, hot snare, biopsy revealed tubular adenoma.  Negative for high-grade  dysplasia or malignancy.  Ascending colon, proximal polypoid area and cecal junction, biopsy revealed benign colonic mucosa with an intramucosal lymphoid aggregate. Negative for dysplasia or malignancy.  Hepatic flexure polyp, hot snare, biopsy revealed fragments of tubular adenoma. Negative for high-grade dysplasia or malignancy. Random colon, biopsies revealed benign colonic mucosa with no diagnostic abdomen abnormalities.  Negative for chronic or active colitis including lymphocytic or collagenous colitis. Negative for dysplasia or malignancy.    Assessment:      ICD-10-CM ICD-9-CM   1. History of hepatitis C Z86.19 V12.09   2. Chronic liver disease K76.9 571.9   3. Adenomatous polyp of transverse colon D12.3 211.3         Discussion:  1.     Plan/  Patient Instructions   1. Avoid alcohol.  2. The patient was counseled for smoking cessation (Smoking Cessation Counseling asymptomatic//3 minutes).  3. Follow-up in 6 months with HCVRNA by PCR quantitative and CMP.  4. Precautions regarding hepatitis C reinfection.  5. Follow-up: In July 2020 with CMP and HCVRNA by PCR quantitative.       Sen Morley MD

## 2020-03-23 RX ORDER — CETIRIZINE HYDROCHLORIDE 10 MG/1
10 TABLET ORAL DAILY PRN
Qty: 30 TABLET | Refills: 0 | Status: SHIPPED | OUTPATIENT
Start: 2020-03-23 | End: 2021-02-24

## 2020-03-23 NOTE — TELEPHONE ENCOUNTER
Patient called needing a refill on her cetirizine (zyrTEC) 10 MG tablet patient has three pills left patient is needing a new script sent.    Patient call back [phone]    Bing Cabezas [facility].    Please advise

## 2020-04-15 ENCOUNTER — OFFICE VISIT (OUTPATIENT)
Dept: INTERNAL MEDICINE | Facility: CLINIC | Age: 70
End: 2020-04-15

## 2020-04-15 VITALS
HEIGHT: 61 IN | SYSTOLIC BLOOD PRESSURE: 153 MMHG | WEIGHT: 118.5 LBS | DIASTOLIC BLOOD PRESSURE: 72 MMHG | BODY MASS INDEX: 22.37 KG/M2

## 2020-04-15 DIAGNOSIS — M25.562 CHRONIC PAIN OF BOTH KNEES: Primary | ICD-10-CM

## 2020-04-15 DIAGNOSIS — B18.2 CHRONIC HEPATITIS C WITHOUT HEPATIC COMA (HCC): ICD-10-CM

## 2020-04-15 DIAGNOSIS — I10 ESSENTIAL HYPERTENSION: ICD-10-CM

## 2020-04-15 DIAGNOSIS — G89.29 CHRONIC PAIN OF BOTH KNEES: Primary | ICD-10-CM

## 2020-04-15 DIAGNOSIS — M25.561 CHRONIC PAIN OF BOTH KNEES: Primary | ICD-10-CM

## 2020-04-15 PROCEDURE — 99442 PR PHYS/QHP TELEPHONE EVALUATION 11-20 MIN: CPT | Performed by: INTERNAL MEDICINE

## 2020-04-15 RX ORDER — LISINOPRIL 20 MG/1
20 TABLET ORAL DAILY
Qty: 30 TABLET | Refills: 2 | Status: SHIPPED | OUTPATIENT
Start: 2020-04-15 | End: 2020-04-30 | Stop reason: SDUPTHER

## 2020-04-15 NOTE — PROGRESS NOTES
..You have chosen to receive care through a telephone visit. Do you consent to use a telephone visit for your medical care today? Yes  The following participated   MD Tres Emerson CMA  Mya Hawkins  Chief Complaint   Patient presents with   • Follow-up     Knee-Pain clinic video visit, not taking BP meds for a while, been running okay per pt.  She has lisinopril 20 mg and HCTZ at home.       Subjective     History of Present Illness   Mya Hawkins is a 69 y.o. female presenting for follow up via telephone visit with concerns for chronic medical problems including knee pain, hypertension, and hair loss.  Patient shares that she does not feel that she has much of a difference with hair loss changes since taking minoxidil.  She basically stopped all of her medications.  She states that her blood pressure has been running fine however when she checked her blood pressure this morning prior to this visit, she realized that her blood pressure has been running high.    As far as current lockdown issues with COVID-19, patient is doing well.  She is working on making masks for herself and is keeping safe and isolated as much as possible.    The following portions of the patient's history were reviewed and updated as appropriate: allergies, current medications, past family history, past medical history, past social history, past surgical history and problem list.    Review of Systems   Constitutional: Negative for chills, fatigue and fever.   HENT: Negative for congestion, ear pain, rhinorrhea, sinus pressure and sore throat.    Eyes: Negative for visual disturbance.   Respiratory: Negative for cough, chest tightness, shortness of breath and wheezing.    Cardiovascular: Negative for chest pain, palpitations and leg swelling.   Gastrointestinal: Negative for abdominal pain, blood in stool, constipation, diarrhea, nausea and vomiting.   Endocrine: Negative for polydipsia and polyuria.   Genitourinary:  "Negative for dysuria and hematuria.   Musculoskeletal: Negative for arthralgias and back pain.   Skin: Negative for rash.   Neurological: Negative for dizziness, light-headedness, numbness and headaches.   Psychiatric/Behavioral: Negative for dysphoric mood and sleep disturbance. The patient is not nervous/anxious.        No Known Allergies    Past Medical History:   Diagnosis Date   • Body piercing     EARS   • Hay fever     REPORTS \"WHEN I LIVED IN [place] IN [place]\"   • Hepatitis C    • History of bronchitis    • History of transfusion     REPORTS SHE THINKS SHE HAS HAD A TRANSFUSION IN THE PAST BUT IS NOT CERTAIN. DID REPORTS IF SHE HAD A TRANSFUSION THAT THER IS NO KNOWN REACTION   • Hypertension    • Seasonal allergies    • Wears glasses     RX   • Wears partial dentures     REPORTS SHE WEARS A PARTIAL IN LOWER MOUTH - INSTRUCTED NO ADHESIVES THE DOS       Social History     Socioeconomic History   • Marital status:      Spouse name: Not on file   • Number of children: Not on file   • Years of education: Not on file   • Highest education level: Not on file   Tobacco Use   • Smoking status: Current Every Day Smoker     Packs/day: 0.50     Years: 50.00     Pack years: 25.00     Types: Cigarettes   • Smokeless tobacco: Never Used   • Tobacco comment: REPORTS SHE SMOKES \"OFF AND ON SINCE AGE 18\"   Substance and Sexual Activity   • Alcohol use: No     Frequency: Never     Comment: SOCIAL USE, REPORTS NO HX OF ETOH ABUSE. QUIT 12/2018   • Drug use: No   • Sexual activity: Defer        Past Surgical History:   Procedure Laterality Date   • CATARACT EXTRACTION Left    • COLONOSCOPY     • COLONOSCOPY N/A 8/16/2018    Procedure: COLONOSCOPY hot snare polypectomy x2, saline injection, cold biopsies, tattoo injection, resolution clip x3;  Surgeon: Sen Morley MD;  Location: [facility] ENDOSCOPY;  Service: Gastroenterology   • JOINT REPLACEMENT Left     KNEE REPLACEMENT, THEN LATER HAD " "REVISION OF LEFT KNEE   • TONSILLECTOMY     • TUBAL ABDOMINAL LIGATION         Family History   Problem Relation Age of Onset   • Diabetes Mother    • Heart attack Mother    • Stroke Mother    • Diabetes Father    • Heart attack Father    • Stroke Father    • Hyperlipidemia Father    • Hypertension Father    • Colon polyps Brother    • Diabetes Brother    • Cancer Paternal Aunt         stomach   • Diabetes Sister    • Colon cancer Neg Hx          Current Outpatient Medications:   •  Blood Pressure Monitor device, For Daily blood pressure measurements., Disp: 1 each, Rfl: 0  •  cetirizine (zyrTEC) 10 MG tablet, Take 1 tablet by mouth Daily As Needed for Allergies., Disp: 30 tablet, Rfl: 0  •  HYDROcodone-acetaminophen (NORCO)  MG per tablet, Norco 10 mg-325 mg tablet  1 PO TID-QID PRN PAIN TO LAST FOR 30 DAYS, Disp: , Rfl:   •  aspirin 81 MG EC tablet, Take 1 tablet by mouth Daily., Disp: 30 tablet, Rfl: 5  •  lisinopril (PRINIVIL,ZESTRIL) 20 MG tablet, Take 1 tablet by mouth Daily., Disp: 30 tablet, Rfl: 2    Objective   /72   Ht 154.9 cm (61\")   Wt 53.8 kg (118 lb 8 oz)   LMP  (LMP Unknown)   BMI 22.39 kg/m²   Unable to obtain vitals    Physical Exam  Unable to perform due to phone visit.     Assessment/Plan   Mya was seen today for follow-up.    Diagnoses and all orders for this visit:    Chronic pain of both knees    Essential hypertension  -     lisinopril (PRINIVIL,ZESTRIL) 20 MG tablet; Take 1 tablet by mouth Daily.    Chronic hepatitis C without hepatic coma (CMS/HCC)          Discussion Summary:  Patient is a 69 y.o. female presenting for follow up     Essential hypertension  -Uncontrolled without taking current medications.  Patient advised to continue taking aspirin and lisinopril.  Restart lisinopril 20 mg p.o. daily.  Discontinue minoxidil and chlorothiazide.    Chronic hepatitis C  - Following with gastroenterology.    Chronic knee pain  - Following with pain management for narcotic " medications.    Total time spent for this encounter was 12 minutes in addressing above listed concerns.     Follow up:  No follow-ups on file.

## 2020-04-30 DIAGNOSIS — I10 ESSENTIAL HYPERTENSION: ICD-10-CM

## 2020-04-30 RX ORDER — LISINOPRIL 20 MG/1
20 TABLET ORAL DAILY
Qty: 90 TABLET | Refills: 1 | Status: SHIPPED | OUTPATIENT
Start: 2020-04-30 | End: 2021-01-06 | Stop reason: SDUPTHER

## 2020-08-04 ENCOUNTER — LAB (OUTPATIENT)
Dept: LAB | Facility: HOSPITAL | Age: 70
End: 2020-08-04

## 2020-08-04 DIAGNOSIS — Z86.19 HISTORY OF HEPATITIS C: ICD-10-CM

## 2020-08-04 LAB
ALBUMIN SERPL-MCNC: 4.2 G/DL (ref 3.5–5.2)
ALBUMIN/GLOB SERPL: 1.1 G/DL
ALP SERPL-CCNC: 76 U/L (ref 39–117)
ALT SERPL W P-5'-P-CCNC: 10 U/L (ref 1–33)
ANION GAP SERPL CALCULATED.3IONS-SCNC: 9.3 MMOL/L (ref 5–15)
AST SERPL-CCNC: 15 U/L (ref 1–32)
BILIRUB SERPL-MCNC: 0.4 MG/DL (ref 0–1.2)
BUN SERPL-MCNC: 16 MG/DL (ref 8–23)
BUN/CREAT SERPL: 16 (ref 7–25)
CALCIUM SPEC-SCNC: 9.6 MG/DL (ref 8.6–10.5)
CHLORIDE SERPL-SCNC: 106 MMOL/L (ref 98–107)
CO2 SERPL-SCNC: 22.7 MMOL/L (ref 22–29)
CREAT SERPL-MCNC: 1 MG/DL (ref 0.57–1)
GFR SERPL CREATININE-BSD FRML MDRD: 66 ML/MIN/1.73
GLOBULIN UR ELPH-MCNC: 3.7 GM/DL
GLUCOSE SERPL-MCNC: 85 MG/DL (ref 65–99)
POTASSIUM SERPL-SCNC: 3.8 MMOL/L (ref 3.5–5.2)
PROT SERPL-MCNC: 7.9 G/DL (ref 6–8.5)
SODIUM SERPL-SCNC: 138 MMOL/L (ref 136–145)

## 2020-08-04 PROCEDURE — 87522 HEPATITIS C REVRS TRNSCRPJ: CPT

## 2020-08-04 PROCEDURE — 36415 COLL VENOUS BLD VENIPUNCTURE: CPT

## 2020-08-04 PROCEDURE — 80053 COMPREHEN METABOLIC PANEL: CPT

## 2020-08-06 LAB
HCV GENTYP SERPL NAA+PROBE: NORMAL
HCV RNA SERPL NAA+PROBE-ACNC: NORMAL IU/ML
HCV RNA SERPL NAA+PROBE-LOG IU: NORMAL {LOG_IU}/ML
REF LAB TEST REF RANGE: NORMAL

## 2020-08-11 NOTE — PROGRESS NOTES
"     Follow Up Note     Date: 2020   Patient Name: Mya Hawkins  MRN: [id number]  : 1950     Referring Physician: Garland Rodriguez DO    Chief Complaint:    Chief Complaint   Patient presents with   • Follow-up   • Hepatitis       Interval History:   2020  Mya Hawkins is a 70 y.o. female who is here today for follow up for follow up for Chronic hep C.  She is here to discuss the recent lab work report after hep C treatment.   Deny new symptoms    2020  The patient has history of chronic hep C (genotype 1b) diagnosed about 2-3 years ago.  The patient had blood transfusion in the .  There is no history of drug use.  She denies history of tattoos.  There is history of significant alcohol use over the years.  The patient started drinking alcohol at age 18.  She used to drink about 4-5 drinks usually 4-5 times a week.  The patient claims that she \"quit alcohol in \".  However the patient switched to \"nonalcoholic beer\".  She was drinking 4 to 5 cans of nonalcoholic beer on daily basis.  She finally quit nonalcoholic beer as well in 2018.  The patient has been vaccinated against hepatitis A.  It appears that the patient had been exposed to hepatitis B in the past, got rid of it and is currently immune to it.  There is no history of jaundice.  She denies darkening of urine color, lightening of stool color or pruritus.  The patient was also found to be positive for DAE.     The patient denies further diarrhea.  Currently she is been having 1 and occasionally 2 formed stools.  She denies reflux.  There is no dysphagia or odynophagia.  There is no history of hematemesis, melena or hematochezia.  She denies recent weight loss    Subjective      Past Medical History:   Past Medical History:   Diagnosis Date   • Body piercing     EARS   • Hay fever     REPORTS \"WHEN I LIVED IN [place] IN THE EARLY SPRING AND THE EARLY FALL\"   • Hepatitis C    • History of bronchitis    • " "History of transfusion     REPORTS SHE THINKS SHE HAS HAD A TRANSFUSION IN THE PAST BUT IS NOT CERTAIN. DID REPORTS IF SHE HAD A TRANSFUSION THAT THER IS NO KNOWN REACTION   • Hypertension    • Seasonal allergies    • Wears glasses     RX   • Wears partial dentures     REPORTS SHE WEARS A PARTIAL IN LOWER MOUTH - INSTRUCTED NO ADHESIVES THE DOS     Past Surgical History:   Past Surgical History:   Procedure Laterality Date   • CATARACT EXTRACTION Left    • COLONOSCOPY     • COLONOSCOPY N/A 8/16/2018    Procedure: COLONOSCOPY hot snare polypectomy x2, saline injection, cold biopsies, tattoo injection, resolution clip x3;  Surgeon: Sen Morley MD;  Location: [facility] ENDOSCOPY;  Service: Gastroenterology   • JOINT REPLACEMENT Left     KNEE REPLACEMENT, THEN LATER HAD REVISION OF LEFT KNEE   • TONSILLECTOMY     • TUBAL ABDOMINAL LIGATION         Family History:   Family History   Problem Relation Age of Onset   • Diabetes Mother    • Heart attack Mother    • Stroke Mother    • Diabetes Father    • Heart attack Father    • Stroke Father    • Hyperlipidemia Father    • Hypertension Father    • Colon polyps Brother    • Diabetes Brother    • Cancer Paternal Aunt         stomach   • Diabetes Sister    • Colon cancer Neg Hx        Social History:   Social History     Socioeconomic History   • Marital status:      Spouse name: Not on file   • Number of children: Not on file   • Years of education: Not on file   • Highest education level: Not on file   Tobacco Use   • Smoking status: Current Every Day Smoker     Packs/day: 0.50     Years: 50.00     Pack years: 25.00     Types: Cigarettes   • Smokeless tobacco: Never Used   • Tobacco comment: REPORTS SHE SMOKES \"OFF AND ON SINCE AGE 18\"   Substance and Sexual Activity   • Alcohol use: No     Frequency: Never     Comment: SOCIAL USE, REPORTS NO HX OF ETOH ABUSE. QUIT 12/2018   • Drug use: No   • Sexual activity: Defer       Medications:     Current Outpatient " "Medications:   •  aspirin 81 MG EC tablet, Take 1 tablet by mouth Daily., Disp: 30 tablet, Rfl: 5  •  Biotin 10 MG capsule, Take  by mouth., Disp: , Rfl:   •  Blood Pressure Monitor device, For Daily blood pressure measurements., Disp: 1 each, Rfl: 0  •  cetirizine (zyrTEC) 10 MG tablet, Take 1 tablet by mouth Daily As Needed for Allergies., Disp: 30 tablet, Rfl: 0  •  HYDROcodone-acetaminophen (NORCO)  MG per tablet, Norco 10 mg-325 mg tablet  1 PO TID-QID PRN PAIN TO LAST FOR 30 DAYS, Disp: , Rfl:   •  lisinopril (PRINIVIL,ZESTRIL) 20 MG tablet, Take 1 tablet by mouth Daily., Disp: 90 tablet, Rfl: 1    Allergies:   No Known Allergies    Review of Systems:   Review of Systems   Constitutional: Negative for appetite change, fatigue and unexpected weight loss.   Gastrointestinal: Negative for abdominal distention, abdominal pain, anal bleeding, blood in stool, constipation, diarrhea, nausea, rectal pain, vomiting, GERD and indigestion.       The following portions of the patient's history were reviewed and updated as appropriate: allergies, current medications, past family history, past medical history, past social history, past surgical history and problem list.    Objective     Physical Exam:  Vital Signs:   Vitals:    08/12/20 1436   BP: 122/70   Pulse: 64   Resp: 18   Temp: 98.4 °F (36.9 °C)   TempSrc: Temporal   SpO2: 98%   Weight: 49 kg (108 lb)   Height: 154.9 cm (61\")       Physical Exam   Constitutional: She is oriented to person, place, and time.   She is using cane to ambulate fairly built and nourished   HENT:   Mouth/Throat: Oropharynx is clear and moist.   Eyes: Conjunctivae and EOM are normal.   Neck: Neck supple.   Cardiovascular: Normal rate and regular rhythm.   No murmur heard.  Pulmonary/Chest: Effort normal and breath sounds normal. No respiratory distress.   Abdominal: Soft. Bowel sounds are normal. She exhibits no distension and no mass. There is no tenderness. No hernia. "   Lymphadenopathy:     She has no cervical adenopathy.   Neurological: She is alert and oriented to person, place, and time. No sensory deficit.   Skin: Skin is warm and dry.   Nursing note and vitals reviewed.      Results Review:   I reviewed the patient's new clinical results.    Lab on 08/04/2020   Component Date Value Ref Range Status   • Glucose 08/04/2020 85  65 - 99 mg/dL Final   • BUN 08/04/2020 16  8 - 23 mg/dL Final   • Creatinine 08/04/2020 1.00  0.57 - 1.00 mg/dL Final   • Sodium 08/04/2020 138  136 - 145 mmol/L Final   • Potassium 08/04/2020 3.8  3.5 - 5.2 mmol/L Final   • Chloride 08/04/2020 106  98 - 107 mmol/L Final   • CO2 08/04/2020 22.7  22.0 - 29.0 mmol/L Final   • Calcium 08/04/2020 9.6  8.6 - 10.5 mg/dL Final   • Total Protein 08/04/2020 7.9  6.0 - 8.5 g/dL Final   • Albumin 08/04/2020 4.20  3.50 - 5.20 g/dL Final   • ALT (SGPT) 08/04/2020 10  1 - 33 U/L Final   • AST (SGOT) 08/04/2020 15  1 - 32 U/L Final   • Alkaline Phosphatase 08/04/2020 76  39 - 117 U/L Final   • Total Bilirubin 08/04/2020 0.4  0.0 - 1.2 mg/dL Final   • eGFR   Amer 08/04/2020 66  >60 mL/min/1.73 Final   • Globulin 08/04/2020 3.7  gm/dL Final   • A/G Ratio 08/04/2020 1.1  g/dL Final   • BUN/Creatinine Ratio 08/04/2020 16.0  7.0 - 25.0 Final   • Anion Gap 08/04/2020 9.3  5.0 - 15.0 mmol/L Final   • Hepatitis C Quantitation 08/04/2020 HCV Not Detected  IU/mL Final   • HCV log10 08/04/2020    Final    Unable to calculate result since non-numeric result obtained for  component test.   • HCV Test Information 08/04/2020 Comment   Final    The quantitative range of this assay is 15 IU/mL to 100 million IU/mL.   • HCV Genotype 08/04/2020    Final    Not indicated      No radiology results for the last 90 days.    Assessment / Plan      1.  Chronic hep C infection  Genotype 1b  F3 fibrosis  She was treated with the Mavyret at 8-week course of treatment.  No adverse reactions from the medication.  No current or new GI  symptoms  Her end of treatment hep C viral load undetectable  We will get SVR 12, in 3 months time and SVR 24 in 6 months time  Given her F3 fibrosis with bridging fibrosis she will need a HCC surveillance with ultrasound every 6 months  Follow-up in 3 months    2.  Adenomatous colon polyp  Last colonoscopy done in 2018 revealed a advanced polyp more than 1 cm in size, tubular adenomas  She is due for colonoscopy in 2021          Prior history     Laboratory Testing:  Upon review of medical records:     Dated July 13, 2018 sodium 140 potassium 4.2 chloride 105 CO2 27 BUN 11 serum creatinine 0.80 glucose 88.  Calcium 9.8.  Total protein 8.1.  Albumin 4.10.  T bili 0.8 AST 41 ALT 33 alkaline phosphatase 75.  Total cholesterol 142.  Triglycerides 67.  Hepatitis A IgM negative.  Hepatitis B surface antigen negative.  Hepatitis B core IgM negative.  Hepatitis C virus antibody positive at >11.0.     Dated July 18, 2018 HCV by PCR quantitative 2,700,000 IU/mL.  6.431 log 10 IU/mL.      Dated September 27, 2018 sodium 142 potassium 4.1 chloride 107 CO2 28 BUN 11 serum creatinine 0.80 glucose 89.  Calcium 9.9.  Total protein 9.0.  Albumin 4.70.  T bili 0.4 AST 41 ALT 48 alkaline phosphatase 102.  Serum iron 87.  TIBC 375.  Iron saturation 23%.  Ferritin 339.00.  Alpha-1 antitrypsin level 185.  Phenotype MM.  DAE direct positive.  Smooth muscle antibody negative at 16.  Mitochondrial antibody negative at <20.0.  Hepatitis A IgM negative.  Hepatitis A total antibody negative.  Hepatitis B core IgM negative.  Hepatitis B core total antibody positive.  Hepatitis B surface antibody reactive.  Hepatitis B surface antigen negative.     Dated January 31, 2019 HCV Fibrosure: Fibrosis score 0.64 (elevated).  Fibrosis stage F3-bridging fibrosis with many septa.  Necroinflammatory activity score elevated at 0.23, activity grade A0 to A1.  Alpha-2-macroglobulin quantitative elevated at 344.  Haptoglobin 41.  GGT elevated at  85.Apolipoprotein A-1 level at 197.     Dated April 2, 2019 TSH 0.485.  Vitamin B12 level 375.    Dated April 15, 2019 WBC 6.69 hemoglobin 15.4 hematocrit 45.9 MCV 88.3 platelet count 211.  PT 13.3.  INR 0.98.  PTT 33.2.     Dated April 30, 2019 sodium 135 potassium 4.0 chloride 106 CO2 27 BUN 10 serum creatinine 0.90 glucose 91.  Calcium 9.6.  Total protein 8.1.  Albumin 4.10.  T bili 0.5 AST 41 ALT 31 alkaline phosphatase 87.  HCVRNA by PCR quantitative 132, 000 IU/mL.  5.121 log 10 IU/mL.  Genotype 1b.     Dated June 11, 2019 sodium 141 potassium 4.1 chloride 105 CO2 30 BUN 12 serum creatinine 0.90 glucose 97.  Calcium 9.5.  Total protein 8.4.  Albumin 4.20.  T bili 0.4 AST 22 ALT 15 alkaline phosphatase 79.  WBC 7.39 hemoglobin 14.5 hematocrit 43.4 MCV 88.4 and platelet count 176.     Abdominal Imaging:  Upon review of medical records:     Dated April 15, 2019 the patient underwent a CT-guided liver biopsy which revealed: Mild chronic hepatitis (grade 1/4, stage I to early 2/4).  Negative for malignancy.  Sections in the current K showed chronic hepatitis that is fairly mild.  The portal areas are expanded by chronic inflammatory infiltrate, but interface hepatitis, piecemeal necrosis, and overt hepatocyte destruction are not negligible.  There is mild fibrosis (early oneal-portal/portal-portal septae) in one area, but discrete nodular architectural distortion and definite sclerosis are not identified in the specimen.  This is further evidenced by the trichrome stain, which shows only expanded portal areas and one single portal area with early portal septae.  Iron is present within the iron stain (overall mild).     Procedures:  Upon review of medical records:     Dated August 16, 2018 the patient underwent a colonoscopy to the terminal ileum which revealed: Scant diverticulosis.  Colon polyps.  2 were removed 10-12 mm in size.  Internal hemorrhoids.  No endoscopic evidence of colitis was seen.  Random biopsies  were obtained from the colon upon withdrawal of scope. Transverse colon polyp, hot snare, biopsy revealed tubular adenoma.  Negative for high-grade dysplasia or malignancy.  Ascending colon, proximal polypoid area and cecal junction, biopsy revealed benign colonic mucosa with an intramucosal lymphoid aggregate.  Negative for dysplasia or malignancy.  Hepatic flexure polyp, hot snare, biopsy revealed fragments of tubular adenoma.  Negative for high-grade dysplasia or malignancy. Random colon, biopsies revealed benign colonic mucosa with no diagnostic abdomen abnormalities.  Negative for chronic or active colitis including lymphocytic or collagen is colitis.  Negative for dysplasia or malignancy.        Follow Up:   No follow-ups on file.    Dario Mcallister MD  [facility] [place]  8/12/2020  14:38     Please note that portions of this note may have been completed with a voice recognition program.

## 2020-08-12 ENCOUNTER — OFFICE VISIT (OUTPATIENT)
Dept: GASTROENTEROLOGY | Facility: CLINIC | Age: 70
End: 2020-08-12

## 2020-08-12 VITALS
OXYGEN SATURATION: 98 % | HEART RATE: 64 BPM | HEIGHT: 61 IN | TEMPERATURE: 98.4 F | WEIGHT: 108 LBS | RESPIRATION RATE: 18 BRPM | DIASTOLIC BLOOD PRESSURE: 70 MMHG | BODY MASS INDEX: 20.39 KG/M2 | SYSTOLIC BLOOD PRESSURE: 122 MMHG

## 2020-08-12 DIAGNOSIS — Z86.19 HISTORY OF HEPATITIS C: Primary | ICD-10-CM

## 2020-08-12 PROCEDURE — 99213 OFFICE O/P EST LOW 20 MIN: CPT | Performed by: INTERNAL MEDICINE

## 2020-08-12 RX ORDER — BIOTIN 10000 MCG
10 CAPSULE ORAL DAILY
COMMUNITY
End: 2022-05-03

## 2020-11-12 ENCOUNTER — RESULTS ENCOUNTER (OUTPATIENT)
Dept: GASTROENTEROLOGY | Facility: CLINIC | Age: 70
End: 2020-11-12

## 2020-11-12 DIAGNOSIS — Z86.19 HISTORY OF HEPATITIS C: ICD-10-CM

## 2020-11-25 ENCOUNTER — OFFICE VISIT (OUTPATIENT)
Dept: GASTROENTEROLOGY | Facility: CLINIC | Age: 70
End: 2020-11-25

## 2020-11-25 ENCOUNTER — RESULTS ENCOUNTER (OUTPATIENT)
Dept: GASTROENTEROLOGY | Facility: CLINIC | Age: 70
End: 2020-11-25

## 2020-11-25 ENCOUNTER — LAB (OUTPATIENT)
Dept: LAB | Facility: HOSPITAL | Age: 70
End: 2020-11-25

## 2020-11-25 VITALS
WEIGHT: 111 LBS | BODY MASS INDEX: 20.96 KG/M2 | TEMPERATURE: 97.8 F | RESPIRATION RATE: 20 BRPM | HEIGHT: 61 IN | DIASTOLIC BLOOD PRESSURE: 60 MMHG | HEART RATE: 55 BPM | SYSTOLIC BLOOD PRESSURE: 132 MMHG

## 2020-11-25 DIAGNOSIS — Z86.19 HISTORY OF HEPATITIS C: ICD-10-CM

## 2020-11-25 DIAGNOSIS — Z86.19 HISTORY OF HEPATITIS C: Primary | ICD-10-CM

## 2020-11-25 PROCEDURE — 36415 COLL VENOUS BLD VENIPUNCTURE: CPT

## 2020-11-25 PROCEDURE — 99213 OFFICE O/P EST LOW 20 MIN: CPT | Performed by: INTERNAL MEDICINE

## 2020-11-25 PROCEDURE — 87522 HEPATITIS C REVRS TRNSCRPJ: CPT

## 2020-11-25 NOTE — PROGRESS NOTES
"     Follow Up Note     Date: 2020   Patient Name: Mya Hawkins  MRN: [id number]  : 1950     Referring Physician: Garland Rodriguez DO    Chief Complaint:    Chief Complaint   Patient presents with   • Liver Follow-up     Hx Hep C, treated       Interval History:   2020  Mya Hawkins is a 70 y.o. female who is here today for follow-up for her chronic hep C infection.  She finished her hep C treatment 3 months ago.   Denies any new abdominal symptoms.  She is here for follow-up.     2020  Mya Hawkins is a 70 y.o. female who is here today for follow up for follow up for Chronic hep C.  She is here to discuss the recent lab work report after hep C treatment.  Deny new symptoms     2020  The patient has history of chronic hep C (genotype 1b) diagnosed about 2-3 years ago.  The patient had blood transfusion in the .  There is no history of drug use.  She denies history of tattoos.  There is history of significant alcohol use over the years.  The patient started drinking alcohol at age 18.  She used to drink about 4-5 drinks usually 4-5 times a week.  The patient claims that she \"quit alcohol in \".  However the patient switched to \"nonalcoholic beer\".  She was drinking 4 to 5 cans of nonalcoholic beer on daily basis.  She finally quit nonalcoholic beer as well in 2018.  The patient has been vaccinated against hepatitis A.  It appears that the patient had been exposed to hepatitis B in the past, got rid of it and is currently immune to it.  There is no history of jaundice.  She denies darkening of urine color, lightening of stool color or pruritus.  The patient was also found to be positive for DAE.     The patient denies further diarrhea.  Currently she is been having 1 and occasionally 2 formed stools.  She denies reflux.  There is no dysphagia or odynophagia.  There is no history of hematemesis, melena or hematochezia.  She denies recent " "weight loss      Subjective      Past Medical History:   Past Medical History:   Diagnosis Date   • Body piercing     EARS   • Hay fever     REPORTS \"WHEN I LIVED IN [place] IN THE EARLY SPRING AND THE E[place]LY F[place]\"   • Hepatitis C    • History of bronchitis    • History of transfusion     REPORTS SHE THINKS SHE HAS HAD A TRANSFUSION IN THE PAST BUT IS NOT CERTAIN. DID REPORTS IF SHE HAD A TRANSFUSION THAT THER IS NO KNOWN REACTION   • Hypertension    • Seasonal allergies    • Wears glasses     RX   • Wears partial dentures     REPORTS SHE WEARS A PARTIAL IN LOWER MOUTH - INSTRUCTED NO ADHESIVES THE DOS     Past Surgical History:   Past Surgical History:   Procedure Laterality Date   • CATARACT EXTRACTION Left    • COLONOSCOPY     • COLONOSCOPY N/A 8/16/2018    Procedure: COLONOSCOPY hot snare polypectomy x2, saline injection, cold biopsies, tattoo injection, resolution clip x3;  Surgeon: Sen Morley MD;  Location: [facility];  Service: Gastroenterology   • JOINT REPLACEMENT Left     KNEE REPLACEMENT, THEN LATER HAD REVISION OF LEFT KNEE   • TONSILLECTOMY     • TUBAL ABDOMINAL LIGATION         Family History:   Family History   Problem Relation Age of Onset   • Diabetes Mother    • Heart attack Mother    • Stroke Mother    • Diabetes Father    • Heart attack Father    • Stroke Father    • Hyperlipidemia Father    • Hypertension Father    • Colon polyps Brother    • Diabetes Brother    • Cancer Paternal Aunt         stomach   • Diabetes Sister    • Colon cancer Neg Hx        Social History:   Social History     Socioeconomic History   • Marital status:      Spouse name: Not on file   • Number of children: Not on file   • Years of education: Not on file   • Highest education level: Not on file   Tobacco Use   • Smoking status: Current Every Day Smoker     Packs/day: 0.50     Years: 50.00     Pack years: 25.00     Types: Cigarettes   • Smokeless tobacco: Never Used   • Tobacco comment: REPORTS SHE SMOKES " "\"OFF AND ON SINCE AGE 18\"   Substance and Sexual Activity   • Alcohol use: No     Frequency: Never     Comment: SOCIAL USE, REPORTS NO HX OF ETOH ABUSE. QUIT 12/2018   • Drug use: No   • Sexual activity: Defer       Medications:     Current Outpatient Medications:   •  aspirin 81 MG EC tablet, Take 1 tablet by mouth Daily., Disp: 30 tablet, Rfl: 5  •  Biotin 10 MG capsule, Take  by mouth., Disp: , Rfl:   •  Blood Pressure Monitor device, For Daily blood pressure measurements., Disp: 1 each, Rfl: 0  •  cetirizine (zyrTEC) 10 MG tablet, Take 1 tablet by mouth Daily As Needed for Allergies., Disp: 30 tablet, Rfl: 0  •  HYDROcodone-acetaminophen (NORCO)  MG per tablet, Norco 10 mg-325 mg tablet  1 PO TID-QID PRN PAIN TO LAST FOR 30 DAYS, Disp: , Rfl:   •  lisinopril (PRINIVIL,ZESTRIL) 20 MG tablet, Take 1 tablet by mouth Daily., Disp: 90 tablet, Rfl: 1    Allergies:   No Known Allergies    Review of Systems:   Review of Systems   Constitutional: Negative for appetite change, fatigue and unexpected weight loss.   HENT: Negative for trouble swallowing.    Gastrointestinal: Negative for abdominal distention, abdominal pain, anal bleeding, blood in stool, constipation, diarrhea, nausea, rectal pain, vomiting, GERD and indigestion.       The following portions of the patient's history were reviewed and updated as appropriate: allergies, current medications, past family history, past medical history, past social history, past surgical history and problem list.    Objective     Physical Exam:  Vital Signs:   Vitals:    11/25/20 1304   BP: 132/60   Pulse: 55   Resp: 20   Temp: 97.8 °F (36.6 °C)   Weight: 50.3 kg (111 lb)   Height: 154.9 cm (61\")       Physical Exam  Vitals signs and nursing note reviewed.   Constitutional:       Appearance: Normal appearance. She is well-developed.   Eyes:      Conjunctiva/sclera: Conjunctivae normal.   Neck:      Musculoskeletal: Normal range of motion and neck supple.   Cardiovascular: "      Rate and Rhythm: Normal rate and regular rhythm.      Heart sounds: No murmur.   Pulmonary:      Effort: Pulmonary effort is normal. No respiratory distress.      Breath sounds: Normal breath sounds.   Abdominal:      General: Bowel sounds are normal. There is no distension.      Palpations: Abdomen is soft. There is no mass.      Tenderness: There is no abdominal tenderness.      Hernia: No hernia is present.   Lymphadenopathy:      Cervical: No cervical adenopathy.   Skin:     General: Skin is warm and dry.   Neurological:      General: No focal deficit present.      Mental Status: She is alert and oriented to person, place, and time.      Sensory: No sensory deficit.         Results Review:   I reviewed the patient's new clinical results.    No visits with results within 90 Day(s) from this visit.   Latest known visit with results is:   Lab on 08/04/2020   Component Date Value Ref Range Status   • Glucose 08/04/2020 85  65 - 99 mg/dL Final   • BUN 08/04/2020 16  8 - 23 mg/dL Final   • Creatinine 08/04/2020 1.00  0.57 - 1.00 mg/dL Final   • Sodium 08/04/2020 138  136 - 145 mmol/L Final   • Potassium 08/04/2020 3.8  3.5 - 5.2 mmol/L Final   • Chloride 08/04/2020 106  98 - 107 mmol/L Final   • CO2 08/04/2020 22.7  22.0 - 29.0 mmol/L Final   • Calcium 08/04/2020 9.6  8.6 - 10.5 mg/dL Final   • Total Protein 08/04/2020 7.9  6.0 - 8.5 g/dL Final   • Albumin 08/04/2020 4.20  3.50 - 5.20 g/dL Final   • ALT (SGPT) 08/04/2020 10  1 - 33 U/L Final   • AST (SGOT) 08/04/2020 15  1 - 32 U/L Final   • Alkaline Phosphatase 08/04/2020 76  39 - 117 U/L Final   • Total Bilirubin 08/04/2020 0.4  0.0 - 1.2 mg/dL Final   • eGFR   Amer 08/04/2020 66  >60 mL/min/1.73 Final   • Globulin 08/04/2020 3.7  gm/dL Final   • A/G Ratio 08/04/2020 1.1  g/dL Final   • BUN/Creatinine Ratio 08/04/2020 16.0  7.0 - 25.0 Final   • Anion Gap 08/04/2020 9.3  5.0 - 15.0 mmol/L Final   • Hepatitis C Quantitation 08/04/2020 HCV Not Detected   IU/mL Final   • HCV log10 08/04/2020    Final    Unable to calculate result since non-numeric result obtained for  component test.   • HCV Test Information 08/04/2020 Comment   Final    The quantitative range of this assay is 15 IU/mL to 100 million IU/mL.   • HCV Genotype 08/04/2020    Final    Not indicated      No radiology results for the last 90 days.    Assessment / Plan      1.  Chronic hep C infection  11/25/2020  Denies any new symptoms.  Her end up treatment hep C viral load was undetected.  She needs a hep C viral load 3 months after treatment now and 6 months after treatment to confirm the eradication of the hep C infection.   Her hep C fibrosure showed F3 fibrosis however her liver biopsy did not reveal any significant fibrosis.  She had a mild steatohepatitis with minimal fibrosis without any evidence of cirrhosis.   Follow-up in 3 months time    8/12/2020  Genotype 1b  F3 fibrosis  She was treated with the Mavyret at 8-week course of treatment.  No adverse reactions from the medication.  No current or new GI symptoms  Her end of treatment hep C viral load undetectable  We will get SVR 12, in 3 months time and SVR 24 in 6 months time  Follow-up in 3 months     2.  Adenomatous colon polyp  Last colonoscopy done in 2018 revealed a advanced polyp more than 1 cm in size, tubular adenomas  She is due for colonoscopy in 2021              Prior history     Laboratory Testing:  Upon review of medical records:     Dated July 13, 2018 sodium 140 potassium 4.2 chloride 105 CO2 27 BUN 11 serum creatinine 0.80 glucose 88.  Calcium 9.8.  Total protein 8.1.  Albumin 4.10.  T bili 0.8 AST 41 ALT 33 alkaline phosphatase 75.  Total cholesterol 142.  Triglycerides 67.  Hepatitis A IgM negative.  Hepatitis B surface antigen negative.  Hepatitis B core IgM negative.  Hepatitis C virus antibody positive at >11.0.     Dated July 18, 2018 HCV by PCR quantitative 2,700,000 IU/mL.  6.431 log 10 IU/mL.   Dated September 27, 2018  sodium 142 potassium 4.1 chloride 107 CO2 28 BUN 11 serum creatinine 0.80 glucose 89.  Calcium 9.9.  Total protein 9.0.  Albumin 4.70.  T bili 0.4 AST 41 ALT 48 alkaline phosphatase 102.  Serum iron 87.  TIBC 375.  Iron saturation 23%.  Ferritin 339.00.  Alpha-1 antitrypsin level 185.  Phenotype MM.  DAE direct positive.  Smooth muscle antibody negative at 16.  Mitochondrial antibody negative at <20.0.  Hepatitis A IgM negative.  Hepatitis A total antibody negative.  Hepatitis B core IgM negative.  Hepatitis B core total antibody positive.  Hepatitis B surface antibody reactive.  Hepatitis B surface antigen negative.     Dated January 31, 2019 HCV Fibrosure: Fibrosis score 0.64 (elevated).  Fibrosis stage F3-bridging fibrosis with many septa.  Necroinflammatory activity score elevated at 0.23, activity grade A0 to A1.  Alpha-2-macroglobulin quantitative elevated at 344.  Haptoglobin 41.  GGT elevated at 85.Apolipoprotein A-1 level at 197.     Dated April 2, 2019 TSH 0.485.  Vitamin B12 level 375.    Dated April 15, 2019 WBC 6.69 hemoglobin 15.4 hematocrit 45.9 MCV 88.3 platelet count 211.  PT 13.3.  INR 0.98.  PTT 33.2.     Dated April 30, 2019 sodium 135 potassium 4.0 chloride 106 CO2 27 BUN 10 serum creatinine 0.90 glucose 91.  Calcium 9.6.  Total protein 8.1.  Albumin 4.10.  T bili 0.5 AST 41 ALT 31 alkaline phosphatase 87.  HCVRNA by PCR quantitative 132, 000 IU/mL.  5.121 log 10 IU/mL.  Genotype 1b.     Dated June 11, 2019 sodium 141 potassium 4.1 chloride 105 CO2 30 BUN 12 serum creatinine 0.90 glucose 97.  Calcium 9.5.  Total protein 8.4.  Albumin 4.20.  T bili 0.4 AST 22 ALT 15 alkaline phosphatase 79.  WBC 7.39 hemoglobin 14.5 hematocrit 43.4 MCV 88.4 and platelet count 176.     Abdominal Imaging:  Upon review of medical records:     Dated April 15, 2019 the patient underwent a CT-guided liver biopsy which revealed: Mild chronic hepatitis (grade 1/4, stage I to early 2/4).  Negative for malignancy.   Sections in the current K showed chronic hepatitis that is fairly mild.  The portal areas are expanded by chronic inflammatory infiltrate, but interface hepatitis, piecemeal necrosis, and overt hepatocyte destruction are not negligible.  There is mild fibrosis (early oneal-portal/portal-portal septae) in one area, but discrete nodular architectural distortion and definite sclerosis are not identified in the specimen.  This is further evidenced by the trichrome stain, which shows only expanded portal areas and one single portal area with early portal septae.  Iron is present within the iron stain (overall mild).     Procedures:  Upon review of medical records:     Dated August 16, 2018 the patient underwent a colonoscopy to the terminal ileum which revealed: Scant diverticulosis.  Colon polyps.  2 were removed 10-12 mm in size.  Internal hemorrhoids.  No endoscopic evidence of colitis was seen.  Random biopsies were obtained from the colon upon withdrawal of scope. Transverse colon polyp, hot snare, biopsy revealed tubular adenoma.  Negative for high-grade dysplasia or malignancy.  Ascending colon, proximal polypoid area and cecal junction, biopsy revealed benign colonic mucosa with an intramucosal lymphoid aggregate.  Negative for dysplasia or malignancy.  Hepatic flexure polyp, hot snare, biopsy revealed fragments of tubular adenoma.  Negative for high-grade dysplasia or malignancy. Random colon, biopsies revealed benign colonic mucosa with no diagnostic abdomen abnormalities.  Negative for chronic or active colitis including lymphocytic or collagen is colitis.  Negative for dysplasia or malignancy.          Follow Up:   No follow-ups on file.    Dario Mcallister MD  [facility]  11/25/2020  13:06 EST     Please note that portions of this note may have been completed with a voice recognition program.

## 2020-11-27 LAB
HCV RNA SERPL NAA+PROBE-ACNC: NORMAL IU/ML
TEST INFORMATION: NORMAL

## 2021-01-06 DIAGNOSIS — I10 ESSENTIAL HYPERTENSION: ICD-10-CM

## 2021-01-06 NOTE — TELEPHONE ENCOUNTER
Caller: Mya Hawkins    Relationship: Self    Best call back number: [phone]    Medication needed:   Requested Prescriptions     Pending Prescriptions Disp Refills   • lisinopril (PRINIVIL,ZESTRIL) 20 MG tablet 90 tablet 1     Sig: Take 1 tablet by mouth Daily.       When do you need the refill by: ASAP  What details did the patient provide when requesting the medication: PATIENT HAS TWO DAYS LEFT    Does the patient have less than a 3 day supply:  [x] Yes  [] No    What is the patient's preferred pharmacy: [address] [phone] [place] [phone]

## 2021-01-07 RX ORDER — LISINOPRIL 20 MG/1
20 TABLET ORAL DAILY
Qty: 90 TABLET | Refills: 0 | Status: SHIPPED | OUTPATIENT
Start: 2021-01-07 | End: 2021-04-07

## 2021-02-24 ENCOUNTER — OFFICE VISIT (OUTPATIENT)
Dept: GASTROENTEROLOGY | Facility: CLINIC | Age: 71
End: 2021-02-24

## 2021-02-24 VITALS
HEIGHT: 61 IN | SYSTOLIC BLOOD PRESSURE: 118 MMHG | BODY MASS INDEX: 21.34 KG/M2 | TEMPERATURE: 97.7 F | WEIGHT: 113 LBS | DIASTOLIC BLOOD PRESSURE: 62 MMHG | OXYGEN SATURATION: 93 % | HEART RATE: 63 BPM

## 2021-02-24 DIAGNOSIS — Z86.19 HISTORY OF HEPATITIS C: Primary | ICD-10-CM

## 2021-02-24 DIAGNOSIS — Z01.812 PRE-PROCEDURE LAB EXAM: Primary | ICD-10-CM

## 2021-02-24 DIAGNOSIS — Z86.010 PERSONAL HISTORY OF COLONIC POLYPS: ICD-10-CM

## 2021-02-24 PROCEDURE — 99214 OFFICE O/P EST MOD 30 MIN: CPT | Performed by: INTERNAL MEDICINE

## 2021-02-24 RX ORDER — BISACODYL 5 MG/1
20 TABLET, DELAYED RELEASE ORAL ONCE
Qty: 4 TABLET | Refills: 0 | Status: SHIPPED | OUTPATIENT
Start: 2021-02-24 | End: 2021-02-24

## 2021-02-24 RX ORDER — SODIUM CHLORIDE 9 MG/ML
70 INJECTION, SOLUTION INTRAVENOUS CONTINUOUS PRN
Status: CANCELLED | OUTPATIENT
Start: 2021-02-24

## 2021-02-24 NOTE — PROGRESS NOTES
"     Follow Up Note     Date: 2021   Patient Name: Mya Hawkins  MRN: [id number]  : 1950     Referring Physician: Garland Rodriguez DO    Chief Complaint:    Chief Complaint   Patient presents with   • Follow-up   • Hepatitis       Interval History:   2021  Mya Hawkins is a 70 y.o. female who is here today for follow up for her ongoing chronic hep C treatment.  Patient denies any current GI symptoms  She had a recent lab work done she is here to discuss the report.  She finished her treatment in 2020  Mya Hawkins is a 70 y.o. female who is here today for follow-up for her chronic hep C infection.  She finished her hep C treatment 3 months ago.   Denies any new abdominal symptoms.  She is here for follow-up.      2020  Mya Hawkins is a 70 y.o. female who is here today for follow up for follow up for Chronic hep C.  She is here to discuss the recent lab work report after hep C treatment.  Deny new symptoms     2020  The patient has history of chronic hep C (genotype 1b) diagnosed about 2-3 years ago.  The patient had blood transfusion in the .  There is no history of drug use.  She denies history of tattoos.  There is history of significant alcohol use over the years.  The patient started drinking alcohol at age 18.  She used to drink about 4-5 drinks usually 4-5 times a week.  The patient claims that she \"quit alcohol in \".  However the patient switched to \"nonalcoholic beer\".  She was drinking 4 to 5 cans of nonalcoholic beer on daily basis.  She finally quit nonalcoholic beer as well in 2018.  The patient has been vaccinated against hepatitis A.  It appears that the patient had been exposed to hepatitis B in the past, got rid of it and is currently immune to it.  There is no history of jaundice.  She denies darkening of urine color, lightening of stool color or pruritus.  The patient was also found to be " "positive for DAE.     The patient denies further diarrhea.  Currently she is been having 1 and occasionally 2 formed stools.  She denies reflux.  There is no dysphagia or odynophagia.  There is no history of hematemesis, melena or hematochezia.  She denies recent weight loss    Subjective      Past Medical History:   Past Medical History:   Diagnosis Date   • Body piercing     EARS   • Hay fever     REPORTS \"WHEN I LIVED IN [place] IN THE [place]LY [place] AND [place]\"   • Hepatitis C    • History of bronchitis    • History of transfusion     REPORTS SHE THINKS SHE HAS HAD A TRANSFUSION IN THE PAST BUT IS NOT CERTAIN. DID REPORTS IF SHE HAD A TRANSFUSION THAT THER IS NO KNOWN REACTION   • Hypertension    • Seasonal allergies    • Wears glasses     RX   • Wears partial dentures     REPORTS SHE WEARS A PARTIAL IN LOWER MOUTH - INSTRUCTED NO ADHESIVES THE DOS     Past Surgical History:   Past Surgical History:   Procedure Laterality Date   • CATARACT EXTRACTION Left    • COLONOSCOPY     • COLONOSCOPY N/A 8/16/2018    Procedure: COLONOSCOPY hot snare polypectomy x2, saline injection, cold biopsies, tattoo injection, resolution clip x3;  Surgeon: Sen Morley MD;  Location: [facility] ENDOSCOPY;  Service: Gastroenterology   • JOINT REPLACEMENT Left     KNEE REPLACEMENT, THEN LATER HAD REVISION OF LEFT KNEE   • TONSILLECTOMY     • TUBAL ABDOMINAL LIGATION         Family History:   Family History   Problem Relation Age of Onset   • Diabetes Mother    • Heart attack Mother    • Stroke Mother    • Diabetes Father    • Heart attack Father    • Stroke Father    • Hyperlipidemia Father    • Hypertension Father    • Colon polyps Brother    • Diabetes Brother    • Cancer Paternal Aunt         stomach   • Diabetes Sister    • Colon cancer Neg Hx        Social History:   Social History     Socioeconomic History   • Marital status:      Spouse name: Not on file   • Number of children: Not on file   • Years of education: Not on " "file   • Highest education level: Not on file   Tobacco Use   • Smoking status: Current Every Day Smoker     Packs/day: 0.50     Years: 50.00     Pack years: 25.00     Types: Cigarettes   • Smokeless tobacco: Never Used   • Tobacco comment: REPORTS SHE SMOKES \"OFF AND ON SINCE AGE 18\"   Substance and Sexual Activity   • Alcohol use: No     Frequency: Never     Comment: SOCIAL USE, REPORTS NO HX OF ETOH ABUSE. QUIT 12/2018   • Drug use: No   • Sexual activity: Defer       Medications:     Current Outpatient Medications:   •  aspirin 81 MG EC tablet, Take 1 tablet by mouth Daily., Disp: 30 tablet, Rfl: 5  •  Biotin 10 MG capsule, Take  by mouth., Disp: , Rfl:   •  Blood Pressure Monitor device, For Daily blood pressure measurements., Disp: 1 each, Rfl: 0  •  HYDROcodone-acetaminophen (NORCO)  MG per tablet, Norco 10 mg-325 mg tablet  1 PO TID-QID PRN PAIN TO LAST FOR 30 DAYS, Disp: , Rfl:   •  lisinopril (PRINIVIL,ZESTRIL) 20 MG tablet, Take 1 tablet by mouth Daily. Appointment needed for further refills, Disp: 90 tablet, Rfl: 0    Allergies:   No Known Allergies    Review of Systems:   Review of Systems   Constitutional: Negative for appetite change, fatigue and unexpected weight loss.   HENT: Negative for trouble swallowing.    Gastrointestinal: Negative for abdominal distention, abdominal pain, anal bleeding, blood in stool, constipation, diarrhea, nausea, rectal pain, vomiting, GERD and indigestion.       The following portions of the patient's history were reviewed and updated as appropriate: allergies, current medications, past family history, past medical history, past social history, past surgical history and problem list.    Objective     Physical Exam:  Vital Signs:   Vitals:    02/24/21 1528   BP: 118/62   Pulse: 63   Temp: 97.7 °F (36.5 °C)   TempSrc: Temporal   SpO2: 93%   Weight: 51.3 kg (113 lb)   Height: 154.9 cm (61\")       Physical Exam  Vitals signs and nursing note reviewed.   Constitutional: "       Appearance: She is well-developed.   Eyes:      Conjunctiva/sclera: Conjunctivae normal.   Neck:      Musculoskeletal: Neck supple.   Cardiovascular:      Rate and Rhythm: Normal rate and regular rhythm.      Heart sounds: No murmur.   Pulmonary:      Effort: Pulmonary effort is normal. No respiratory distress.      Breath sounds: Normal breath sounds.   Abdominal:      General: Bowel sounds are normal. There is no distension.      Palpations: Abdomen is soft. There is no mass.      Tenderness: There is no abdominal tenderness.      Hernia: No hernia is present.   Lymphadenopathy:      Cervical: No cervical adenopathy.   Skin:     General: Skin is warm and dry.   Neurological:      General: No focal deficit present.      Mental Status: She is alert and oriented to person, place, and time.         Results Review:   I reviewed the patient's new clinical results.    No visits with results within 90 Day(s) from this visit.   Latest known visit with results is:   Lab on 11/25/2020   Component Date Value Ref Range Status   • Hepatitis C Quantitation 11/25/2020 HCV Not Detected  IU/mL Final   • Test Information 11/25/2020 Comment   Final    The quantitative range of this assay is 15 IU/mL to 100 million IU/mL.      No radiology results for the last 90 days.    Assessment / Plan      1.  Chronic hep C infection  2/24/2021  Denies any current abdominal symptoms.  Her hep C viral load 3 months after treatment was undetectable.   She needs a hep C viral load 6 months after treatment and will schedule that now.   Given her F3 fibrosis we will monitor her at least once in the ER with ultrasound even after treatment      11/25/2020  Denies any new symptoms.  Her end up treatment hep C viral load was undetected.  She needs a hep C viral load 3 months after treatment now and 6 months after treatment to confirm the eradication of the hep C infection.   Her hep C fibrosure showed F3 fibrosis however her liver biopsy did not  reveal any significant fibrosis.  She had a mild steatohepatitis with minimal fibrosis without any evidence of cirrhosis.   Follow-up in 3 months time     8/12/2020  Genotype 1b  F3 fibrosis  She was treated with the Mavyret at 8-week course of treatment.  No adverse reactions from the medication.  No current or new GI symptoms  Her end of treatment hep C viral load undetectable  We will get SVR 12, in 3 months time and SVR 24 in 6 months time  Follow-up in 3 months     2.  Adenomatous colon polyp  2/24/2021  Last colonoscopy done in 2018 revealed a advanced polyp more than 1 cm in size, tubular adenomas.  Dr. Drummond advised repeat colonoscopy in 2021 and will schedule the same  The indications, technique, alternatives and potential risk and complications were discussed with the patient including but not limited to bleeding, bowel perforations, missing lesions and anesthetic complications. The patient understands and wishes to proceed with the procedure and has given their verbal consent. Written patient education information was given to the patient.   The patient will call if they have further questions before procedure.              Prior history     Laboratory Testing:  Upon review of medical records:     Dated July 13, 2018 sodium 140 potassium 4.2 chloride 105 CO2 27 BUN 11 serum creatinine 0.80 glucose 88.  Calcium 9.8.  Total protein 8.1.  Albumin 4.10.  T bili 0.8 AST 41 ALT 33 alkaline phosphatase 75.  Total cholesterol 142.  Triglycerides 67.  Hepatitis A IgM negative.  Hepatitis B surface antigen negative.  Hepatitis B core IgM negative.  Hepatitis C virus antibody positive at >11.0.     Dated July 18, 2018 HCV by PCR quantitative 2,700,000 IU/mL.  6.431 log 10 IU/mL.   Dated September 27, 2018 sodium 142 potassium 4.1 chloride 107 CO2 28 BUN 11 serum creatinine 0.80 glucose 89.  Calcium 9.9.  Total protein 9.0.  Albumin 4.70.  T bili 0.4 AST 41 ALT 48 alkaline phosphatase 102.  Serum iron 87.  TIBC 375.   Iron saturation 23%.  Ferritin 339.00.  Alpha-1 antitrypsin level 185.  Phenotype MM.  DAE direct positive.  Smooth muscle antibody negative at 16.  Mitochondrial antibody negative at <20.0.  Hepatitis A IgM negative.  Hepatitis A total antibody negative.  Hepatitis B core IgM negative.  Hepatitis B core total antibody positive.  Hepatitis B surface antibody reactive.  Hepatitis B surface antigen negative.     Dated January 31, 2019 HCV Fibrosure: Fibrosis score 0.64 (elevated).  Fibrosis stage F3-bridging fibrosis with many septa.  Necroinflammatory activity score elevated at 0.23, activity grade A0 to A1.  Alpha-2-macroglobulin quantitative elevated at 344.  Haptoglobin 41.  GGT elevated at 85.Apolipoprotein A-1 level at 197.     Dated April 2, 2019 TSH 0.485.  Vitamin B12 level 375.    Dated April 15, 2019 WBC 6.69 hemoglobin 15.4 hematocrit 45.9 MCV 88.3 platelet count 211.  PT 13.3.  INR 0.98.  PTT 33.2.     Dated April 30, 2019 sodium 135 potassium 4.0 chloride 106 CO2 27 BUN 10 serum creatinine 0.90 glucose 91.  Calcium 9.6.  Total protein 8.1.  Albumin 4.10.  T bili 0.5 AST 41 ALT 31 alkaline phosphatase 87.  HCVRNA by PCR quantitative 132, 000 IU/mL.  5.121 log 10 IU/mL.  Genotype 1b.     Dated June 11, 2019 sodium 141 potassium 4.1 chloride 105 CO2 30 BUN 12 serum creatinine 0.90 glucose 97.  Calcium 9.5.  Total protein 8.4.  Albumin 4.20.  T bili 0.4 AST 22 ALT 15 alkaline phosphatase 79.  WBC 7.39 hemoglobin 14.5 hematocrit 43.4 MCV 88.4 and platelet count 176.     Abdominal Imaging:  Upon review of medical records:     Dated April 15, 2019 the patient underwent a CT-guided liver biopsy which revealed: Mild chronic hepatitis (grade 1/4, stage I to early 2/4).  Negative for malignancy.  Sections in the current K showed chronic hepatitis that is fairly mild.  The portal areas are expanded by chronic inflammatory infiltrate, but interface hepatitis, piecemeal necrosis, and overt hepatocyte destruction are  not negligible.  There is mild fibrosis (early oneal-portal/portal-portal septae) in one area, but discrete nodular architectural distortion and definite sclerosis are not identified in the specimen.  This is further evidenced by the trichrome stain, which shows only expanded portal areas and one single portal area with early portal septae.  Iron is present within the iron stain (overall mild).     Procedures:  Upon review of medical records:     Dated August 16, 2018 the patient underwent a colonoscopy to the terminal ileum which revealed: Scant diverticulosis.  Colon polyps.  2 were removed 10-12 mm in size.  Internal hemorrhoids.  No endoscopic evidence of colitis was seen.  Random biopsies were obtained from the colon upon withdrawal of scope. Transverse colon polyp, hot snare, biopsy revealed tubular adenoma.  Negative for high-grade dysplasia or malignancy.  Ascending colon, proximal polypoid area and cecal junction, biopsy revealed benign colonic mucosa with an intramucosal lymphoid aggregate.  Negative for dysplasia or malignancy.  Hepatic flexure polyp, hot snare, biopsy revealed fragments of tubular adenoma.  Negative for high-grade dysplasia or malignancy. Random colon, biopsies revealed benign colonic mucosa with no diagnostic abdomen abnormalities.  Negative for chronic or active colitis including lymphocytic or collagen is colitis.  Negative for dysplasia or malignancy.       Follow Up:   No follow-ups on file.    Dario Mcallister MD  [facility] [place]  2/24/2021  15:34 EST     Please note that portions of this note may have been completed with a voice recognition program.

## 2021-03-01 ENCOUNTER — LAB (OUTPATIENT)
Dept: LAB | Facility: HOSPITAL | Age: 71
End: 2021-03-01

## 2021-03-01 PROBLEM — Z86.0100 PERSONAL HISTORY OF COLONIC POLYPS: Status: ACTIVE | Noted: 2021-03-01

## 2021-03-01 PROBLEM — Z86.010 PERSONAL HISTORY OF COLONIC POLYPS: Status: ACTIVE | Noted: 2021-03-01

## 2021-03-01 PROCEDURE — 80053 COMPREHEN METABOLIC PANEL: CPT | Performed by: INTERNAL MEDICINE

## 2021-03-01 PROCEDURE — 87522 HEPATITIS C REVRS TRNSCRPJ: CPT | Performed by: INTERNAL MEDICINE

## 2021-03-01 PROCEDURE — 85025 COMPLETE CBC W/AUTO DIFF WBC: CPT | Performed by: INTERNAL MEDICINE

## 2021-03-03 ENCOUNTER — TELEPHONE (OUTPATIENT)
Dept: INTERNAL MEDICINE | Facility: CLINIC | Age: 71
End: 2021-03-03

## 2021-03-03 ENCOUNTER — OFFICE VISIT (OUTPATIENT)
Dept: INTERNAL MEDICINE | Facility: CLINIC | Age: 71
End: 2021-03-03

## 2021-03-03 VITALS
HEIGHT: 61 IN | OXYGEN SATURATION: 100 % | TEMPERATURE: 97.3 F | DIASTOLIC BLOOD PRESSURE: 60 MMHG | SYSTOLIC BLOOD PRESSURE: 110 MMHG | HEART RATE: 70 BPM | RESPIRATION RATE: 18 BRPM | BODY MASS INDEX: 20.96 KG/M2 | WEIGHT: 111 LBS

## 2021-03-03 DIAGNOSIS — L02.214 ABSCESS OF LEFT GROIN: Primary | ICD-10-CM

## 2021-03-03 PROCEDURE — 99214 OFFICE O/P EST MOD 30 MIN: CPT | Performed by: INTERNAL MEDICINE

## 2021-03-03 RX ORDER — BISACODYL 5 MG
TABLET, DELAYED RELEASE (ENTERIC COATED) ORAL TAKE AS DIRECTED
COMMUNITY
Start: 2021-02-24 | End: 2021-06-17

## 2021-03-03 RX ORDER — POLYETHYLENE GLYCOL 3350, SODIUM SULFATE ANHYDROUS, SODIUM BICARBONATE, SODIUM CHLORIDE, POTASSIUM CHLORIDE 236; 22.74; 6.74; 5.86; 2.97 G/4L; G/4L; G/4L; G/4L; G/4L
POWDER, FOR SOLUTION ORAL TAKE AS DIRECTED
COMMUNITY
Start: 2021-02-24 | End: 2021-04-22 | Stop reason: HOSPADM

## 2021-03-03 RX ORDER — SULFAMETHOXAZOLE AND TRIMETHOPRIM 800; 160 MG/1; MG/1
1 TABLET ORAL 2 TIMES DAILY
Qty: 20 TABLET | Refills: 0 | Status: SHIPPED | OUTPATIENT
Start: 2021-03-03 | End: 2021-04-21

## 2021-03-03 NOTE — TELEPHONE ENCOUNTER
Patient requested a call back. Patient is following up on her referral to general surgery. Patient would like to know if this is ready to be scheduled.    Please call and advise. Patient call back [phone]

## 2021-03-08 NOTE — PROGRESS NOTES
"Chief Complaint   Patient presents with   • Mass      quarter size-for a couple of days on left side groin for a couple of days, painful and this am noticed that it had a white top to it       Subjective     History of Present Illness   Mya Hawkins is a 70 y.o. female presenting for follow up on concerns of a left groin swelling which has grown and become painful over the last couple of days.  She denies fevers or chills.  She noticed a white top like a pimple develop recently.     The following portions of the patient's history were reviewed and updated as appropriate: allergies, current medications, past family history, past medical history, past social history, past surgical history and problem list.    Review of Systems   Skin: Positive for color change and wound.       No Known Allergies    Past Medical History:   Diagnosis Date   • Body piercing     EARS   • Hay fever     REPORTS \"WHEN I LIVED IN [place] IN [place] AND [place]\"   • Hepatitis C    • History of bronchitis    • History of transfusion     REPORTS SHE THINKS SHE HAS HAD A TRANSFUSION IN THE PAST BUT IS NOT CERTAIN. DID REPORTS IF SHE HAD A TRANSFUSION THAT THER IS NO KNOWN REACTION   • Hypertension    • Seasonal allergies    • Wears glasses     RX   • Wears partial dentures     REPORTS SHE WEARS A PARTIAL IN LOWER MOUTH - INSTRUCTED NO ADHESIVES THE DOS       Social History     Socioeconomic History   • Marital status:      Spouse name: Not on file   • Number of children: Not on file   • Years of education: Not on file   • Highest education level: Not on file   Tobacco Use   • Smoking status: Current Every Day Smoker     Packs/day: 0.50     Years: 50.00     Pack years: 25.00     Types: Cigarettes   • Smokeless tobacco: Never Used   • Tobacco comment: REPORTS SHE SMOKES \"OFF AND ON SINCE AGE 18\"   Substance and Sexual Activity   • Alcohol use: No     Comment: SOCIAL USE, REPORTS NO HX OF ETOH ABUSE. QUIT 12/2018   • " "Drug use: No   • Sexual activity: Defer        Past Surgical History:   Procedure Laterality Date   • CATARACT EXTRACTION Left    • COLONOSCOPY     • COLONOSCOPY N/A 8/16/2018    Procedure: COLONOSCOPY hot snare polypectomy x2, saline injection, cold biopsies, tattoo injection, resolution clip x3;  Surgeon: Sen Morley MD;  Location: [facility] ENDOSCOPY;  Service: Gastroenterology   • JOINT REPLACEMENT Left     KNEE REPLACEMENT, THEN LATER HAD REVISION OF LEFT KNEE   • TONSILLECTOMY     • TUBAL ABDOMINAL LIGATION         Family History   Problem Relation Age of Onset   • Diabetes Mother    • Heart attack Mother    • Stroke Mother    • Diabetes Father    • Heart attack Father    • Stroke Father    • Hyperlipidemia Father    • Hypertension Father    • Colon polyps Brother    • Diabetes Brother    • Cancer Paternal Aunt         stomach   • Diabetes Sister    • Colon cancer Neg Hx          Current Outpatient Medications:   •  aspirin 81 MG EC tablet, Take 1 tablet by mouth Daily., Disp: 30 tablet, Rfl: 5  •  Biotin 10 MG capsule, Take  by mouth., Disp: , Rfl:   •  bisacodyl 5 MG EC tablet, For colonoscopy, Disp: , Rfl:   •  HYDROcodone-acetaminophen (NORCO)  MG per tablet, Norco 10 mg-325 mg tablet  1 PO TID-QID PRN PAIN TO LAST FOR 30 DAYS, Disp: , Rfl:   •  lisinopril (PRINIVIL,ZESTRIL) 20 MG tablet, Take 1 tablet by mouth Daily. Appointment needed for further refills, Disp: 90 tablet, Rfl: 0  •  PEG 3350-KCl-NaBcb-NaCl-NaSulf (PEG-3350/Electrolytes) 236 g reconstituted solution, For colonoscopy, Disp: , Rfl:   •  sulfamethoxazole-trimethoprim (Bactrim DS) 800-160 MG per tablet, Take 1 tablet by mouth 2 (Two) Times a Day., Disp: 20 tablet, Rfl: 0    Objective   /60   Pulse 70   Temp 97.3 °F (36.3 °C)   Resp 18   Ht 154.9 cm (61\")   Wt 50.3 kg (111 lb)   LMP  (LMP Unknown)   SpO2 100%   BMI 20.97 kg/m²     Physical Exam  Genitourinary:     Comments: Left groin abscess above " labia        Assessment/Plan   Diagnoses and all orders for this visit:    1. Abscess of left groin (Primary)  -     sulfamethoxazole-trimethoprim (Bactrim DS) 800-160 MG per tablet; Take 1 tablet by mouth 2 (Two) Times a Day.  Dispense: 20 tablet; Refill: 0  -     Ambulatory Referral to General Surgery          Discussion Summary:  Patient is a 70 y.o. female presenting for follow up with Left groin abscess.  Surgical referral placed for urgent I&D.  Bactrim started until she obtains appointment.  She understands that if the lesion was to worsen or grow, she should present to the emergency room.        Follow up:  No follow-ups on file.

## 2021-03-09 NOTE — PROGRESS NOTES
Patient: Mya Hawkins    YOB: 1950    Date: 03/12/2021    Primary Care Provider: Garland Rodriguez DO    Reason for Consultation: Lesion    Chief Complaint   Patient presents with   • Abscess     front groin area       Subjective .     History of present illness: Patient states for at least 2 or 3 weeks she had developing inflammatory process over the pubic region.  She states it had been draining.  She had been placed on antibiotics with improvement.  No fever or chills.    The following portions of the patient's history were reviewed and updated as appropriate: allergies, current medications, past family history, past medical history, past social history, past surgical history and problem list.    Review of Systems   Constitutional: Negative for chills, fever and unexpected weight change.   HENT: Negative for hearing loss, trouble swallowing and voice change.    Eyes: Negative for visual disturbance.   Respiratory: Negative for apnea, cough, chest tightness, shortness of breath and wheezing.    Cardiovascular: Negative for chest pain, palpitations and leg swelling.   Gastrointestinal: Negative for abdominal distention, abdominal pain, anal bleeding, blood in stool, constipation, diarrhea, nausea, rectal pain and vomiting.   Endocrine: Negative for cold intolerance and heat intolerance.   Genitourinary: Negative for difficulty urinating, dysuria and flank pain.   Musculoskeletal: Positive for arthralgias and gait problem. Negative for back pain.   Skin: Positive for wound. Negative for color change and rash.   Neurological: Negative for dizziness, syncope, speech difficulty, weakness, light-headedness, numbness and headaches.   Hematological: Negative for adenopathy. Does not bruise/bleed easily.   Psychiatric/Behavioral: Negative for confusion. The patient is not nervous/anxious.        History:  Past Medical History:   Diagnosis Date   • Body piercing     EARS   • Hay fever     REPORTS  "\"WHEN I LIVED IN [place] IN THE [place]LY [place] AND THE [place]LY [place]\"   • Hepatitis C    • History of bronchitis    • History of transfusion     REPORTS SHE THINKS SHE HAS HAD A TRANSFUSION IN THE PAST BUT IS NOT CERTAIN. DID REPORTS IF SHE HAD A TRANSFUSION THAT THER IS NO KNOWN REACTION   • Hypertension    • Seasonal allergies    • Wears glasses     RX   • Wears partial dentures     REPORTS SHE WEARS A PARTIAL IN LOWER MOUTH - INSTRUCTED NO ADHESIVES THE DOS       Past Surgical History:   Procedure Laterality Date   • CATARACT EXTRACTION Left    • COLONOSCOPY     • COLONOSCOPY N/A 8/16/2018    Procedure: COLONOSCOPY hot snare polypectomy x2, saline injection, cold biopsies, tattoo injection, resolution clip x3;  Surgeon: Sen Morley MD;  Location: [facility] ENDOSCOPY;  Service: Gastroenterology   • JOINT REPLACEMENT Left     KNEE REPLACEMENT, THEN LATER HAD REVISION OF LEFT KNEE   • TONSILLECTOMY     • TUBAL ABDOMINAL LIGATION         Family History   Problem Relation Age of Onset   • Diabetes Mother    • Heart attack Mother    • Stroke Mother    • Diabetes Father    • Heart attack Father    • Stroke Father    • Hyperlipidemia Father    • Hypertension Father    • Colon polyps Brother    • Diabetes Brother    • Cancer Paternal Aunt         stomach   • Diabetes Sister    • Colon cancer Neg Hx        Social History     Tobacco Use   • Smoking status: Current Every Day Smoker     Packs/day: 0.50     Years: 50.00     Pack years: 25.00     Types: Cigarettes   • Smokeless tobacco: Never Used   • Tobacco comment: REPORTS SHE SMOKES \"OFF AND ON SINCE AGE 18\"   Substance Use Topics   • Alcohol use: No     Comment: SOCIAL USE, REPORTS NO HX OF ETOH ABUSE. QUIT 12/2018   • Drug use: No        Allergies:  No Known Allergies    Medications:    Current Outpatient Medications:   •  aspirin 81 MG EC tablet, Take 1 tablet by mouth Daily., Disp: 30 tablet, Rfl: 5  •  Biotin 10 MG capsule, Take  by mouth., Disp: , Rfl:   •  bisacodyl 5 MG " "EC tablet, For colonoscopy, Disp: , Rfl:   •  cetirizine (zyrTEC) 10 MG tablet, Take 1 tablet by mouth Daily As Needed for Allergies., Disp: 90 tablet, Rfl: 1  •  HYDROcodone-acetaminophen (NORCO)  MG per tablet, Norco 10 mg-325 mg tablet  1 PO TID-QID PRN PAIN TO LAST FOR 30 DAYS, Disp: , Rfl:   •  lisinopril (PRINIVIL,ZESTRIL) 20 MG tablet, Take 1 tablet by mouth Daily. Appointment needed for further refills, Disp: 90 tablet, Rfl: 0  •  PEG 3350-KCl-NaBcb-NaCl-NaSulf (PEG-3350/Electrolytes) 236 g reconstituted solution, For colonoscopy, Disp: , Rfl:   •  sulfamethoxazole-trimethoprim (Bactrim DS) 800-160 MG per tablet, Take 1 tablet by mouth 2 (Two) Times a Day., Disp: 20 tablet, Rfl: 0    Objective     Vital Signs:   Vitals:    03/12/21 0919   BP: 118/68   Pulse: 72   Temp: 98.7 °F (37.1 °C)   SpO2: 98%   Weight: 50.3 kg (111 lb)   Height: 154.9 cm (60.98\")       Physical Exam:  General Appearance:    Alert, cooperative, in no acute distress   Lungs:     Clear to auscultation,respirations regular, even and                  unlabored    Heart:    Regular rhythm and normal rate, normal S1 and S2, no            murmur   Abdomen:     Normal bowel sounds, no masses, no organomegaly, soft        non-tender, non-distended, no guarding,    Extremities:  Moves all 4 extremities but walks with a limp due to knee arthritis   Skin:  Likely sebaceous cyst with evidence of prior inflammatory process now improved.  Located over the pubic bone.  No drainage currently   Neurologic:   Normal without gross deficits.   Psychiatric: No evidence of depression or anxiety          Results Review:   None    Review of Systems was reviewed and confirmed as accurate as documented by the MA.    Assessment/Plan     1. Inflamed sebaceous cyst        We will complete the antibiotic course.  I will see her next week for excision.  I have recommended excision since I felt that this will continue to be an issue.  She understands procedure as " well as the risk of bleeding and infection and she wishes to proceed.  Electronically signed by Harris Lewis MD  03/12/21  09:38 EST

## 2021-03-11 RX ORDER — CETIRIZINE HYDROCHLORIDE 10 MG/1
10 TABLET ORAL DAILY PRN
Qty: 90 TABLET | Refills: 1 | Status: SHIPPED | OUTPATIENT
Start: 2021-03-11 | End: 2023-02-02

## 2021-03-11 NOTE — TELEPHONE ENCOUNTER
Caller: Mya Hawkins    Relationship: Self    Best call back number: [phone]    Medication needed: CETIRIZINE     When do you need the refill by: ASAP    What details did the patient provide when requesting the medication: PATIENT WANTS TO PICK THIS UP TODAY AROUND 10.    Does the patient have less than a 3 day supply:  [x] Yes  [] No    What is the patient's preferred pharmacy: [address] [phone] [place] [phone]

## 2021-03-11 NOTE — TELEPHONE ENCOUNTER
Pt has been on and off cetirizine 10 for allergies for the last few years, she only takes as needed. I had taken off med list last visit because she wasn't using at time.  Pended new script for as needed instead of daily.

## 2021-03-12 ENCOUNTER — OFFICE VISIT (OUTPATIENT)
Dept: SURGERY | Facility: CLINIC | Age: 71
End: 2021-03-12

## 2021-03-12 VITALS
TEMPERATURE: 98.7 F | SYSTOLIC BLOOD PRESSURE: 118 MMHG | DIASTOLIC BLOOD PRESSURE: 68 MMHG | HEART RATE: 72 BPM | BODY MASS INDEX: 20.96 KG/M2 | WEIGHT: 111 LBS | OXYGEN SATURATION: 98 % | HEIGHT: 61 IN

## 2021-03-12 DIAGNOSIS — L72.3 INFLAMED SEBACEOUS CYST: Primary | ICD-10-CM

## 2021-03-12 PROCEDURE — 99203 OFFICE O/P NEW LOW 30 MIN: CPT | Performed by: SURGERY

## 2021-03-17 NOTE — PROGRESS NOTES
Patient: Mya Hawkins  YOB: 1950    Date: 03/19/2021    Primary Care Provider: Garland Rodriguez DO    Chief Complaint   Patient presents with   • Procedure       History: Patient here for follow-up and plan excision of his suprapubic skin lesion presumed to be a sebaceous cyst.  She has no complaints.  She appreciates no mass in that region.  No drainage.    The following portions of the patient's history were reviewed and updated as appropriate: allergies, current medications, past family history, past medical history, past social history, past surgical history and problem list.    Review of Systems   Constitutional: Negative for chills, fever and unexpected weight change.   HENT: Negative for hearing loss, trouble swallowing and voice change.    Eyes: Negative for visual disturbance.   Respiratory: Negative for apnea, cough, chest tightness, shortness of breath and wheezing.    Cardiovascular: Negative for chest pain, palpitations and leg swelling.   Gastrointestinal: Negative for abdominal distention, abdominal pain, anal bleeding, blood in stool, constipation, diarrhea, nausea, rectal pain and vomiting.   Endocrine: Negative for cold intolerance and heat intolerance.   Genitourinary: Negative for difficulty urinating, dysuria and flank pain.   Musculoskeletal: Negative for back pain and gait problem.   Skin: Negative for color change, rash and wound.   Neurological: Negative for dizziness, syncope, speech difficulty, weakness, light-headedness, numbness and headaches.   Hematological: Negative for adenopathy. Does not bruise/bleed easily.   Psychiatric/Behavioral: Negative for confusion. The patient is not nervous/anxious.        Vital Signs  There were no vitals filed for this visit.    Allergies:  No Known Allergies    Medications:    Current Outpatient Medications:   •  aspirin 81 MG EC tablet, Take 1 tablet by mouth Daily., Disp: 30 tablet, Rfl: 5  •  Biotin 10 MG capsule, Take  by  mouth., Disp: , Rfl:   •  bisacodyl 5 MG EC tablet, For colonoscopy, Disp: , Rfl:   •  cetirizine (zyrTEC) 10 MG tablet, Take 1 tablet by mouth Daily As Needed for Allergies., Disp: 90 tablet, Rfl: 1  •  HYDROcodone-acetaminophen (NORCO)  MG per tablet, Norco 10 mg-325 mg tablet  1 PO TID-QID PRN PAIN TO LAST FOR 30 DAYS, Disp: , Rfl:   •  lisinopril (PRINIVIL,ZESTRIL) 20 MG tablet, Take 1 tablet by mouth Daily. Appointment needed for further refills, Disp: 90 tablet, Rfl: 0  •  PEG 3350-KCl-NaBcb-NaCl-NaSulf (PEG-3350/Electrolytes) 236 g reconstituted solution, For colonoscopy, Disp: , Rfl:   •  sulfamethoxazole-trimethoprim (Bactrim DS) 800-160 MG per tablet, Take 1 tablet by mouth 2 (Two) Times a Day., Disp: 20 tablet, Rfl: 0    Physical Exam:   General Appearance:    Alert, cooperative, in no acute distress    Heart:   Regular rate and rhythm   Skin:  Site was examined over the pubis.  There is no significant abnormality in that region.  No obvious residual sebaceous cyst seen at this time.     Results Review:   None     Review of Systems was reviewed and confirmed as accurate as documented by the MA.    ASSESSMENT/PLAN:    1. Skin infection       This may have been a primary skin infection.  There is no residual abnormality in the area of concern.  I recommend that we do not excise today and will continue to follow.  If she notes any recurrence she should follow-up with us soon as possible.  Otherwise follow-up as needed.    Electronically signed by Harris Lewis MD  03/19/21

## 2021-03-19 ENCOUNTER — PROCEDURE VISIT (OUTPATIENT)
Dept: SURGERY | Facility: CLINIC | Age: 71
End: 2021-03-19

## 2021-03-19 DIAGNOSIS — L08.9 SKIN INFECTION: Primary | ICD-10-CM

## 2021-03-19 PROCEDURE — 99212 OFFICE O/P EST SF 10 MIN: CPT | Performed by: SURGERY

## 2021-03-22 ENCOUNTER — LAB (OUTPATIENT)
Dept: LAB | Facility: HOSPITAL | Age: 71
End: 2021-03-22

## 2021-03-22 DIAGNOSIS — Z01.812 PRE-PROCEDURE LAB EXAM: ICD-10-CM

## 2021-03-22 PROCEDURE — U0004 COV-19 TEST NON-CDC HGH THRU: HCPCS

## 2021-03-22 PROCEDURE — C9803 HOPD COVID-19 SPEC COLLECT: HCPCS

## 2021-03-23 LAB — SARS-COV-2 RNA NOSE QL NAA+PROBE: NOT DETECTED

## 2021-04-07 DIAGNOSIS — I10 ESSENTIAL HYPERTENSION: ICD-10-CM

## 2021-04-07 RX ORDER — LISINOPRIL 20 MG/1
20 TABLET ORAL DAILY
Qty: 90 TABLET | Refills: 0 | Status: SHIPPED | OUTPATIENT
Start: 2021-04-07 | End: 2021-08-09 | Stop reason: SDUPTHER

## 2021-04-19 ENCOUNTER — TRANSCRIBE ORDERS (OUTPATIENT)
Dept: LAB | Facility: HOSPITAL | Age: 71
End: 2021-04-19

## 2021-04-19 ENCOUNTER — LAB (OUTPATIENT)
Dept: LAB | Facility: HOSPITAL | Age: 71
End: 2021-04-19

## 2021-04-19 DIAGNOSIS — Z01.818 PRE-OP EXAM: ICD-10-CM

## 2021-04-19 DIAGNOSIS — Z01.818 PRE-OP EXAM: Primary | ICD-10-CM

## 2021-04-19 PROCEDURE — U0004 COV-19 TEST NON-CDC HGH THRU: HCPCS

## 2021-04-20 LAB — SARS-COV-2 RNA NOSE QL NAA+PROBE: NOT DETECTED

## 2021-04-22 ENCOUNTER — ANESTHESIA EVENT (OUTPATIENT)
Dept: GASTROENTEROLOGY | Facility: HOSPITAL | Age: 71
End: 2021-04-22

## 2021-04-22 ENCOUNTER — ANESTHESIA (OUTPATIENT)
Dept: GASTROENTEROLOGY | Facility: HOSPITAL | Age: 71
End: 2021-04-22

## 2021-04-22 ENCOUNTER — HOSPITAL ENCOUNTER (OUTPATIENT)
Facility: HOSPITAL | Age: 71
Setting detail: HOSPITAL OUTPATIENT SURGERY
Discharge: HOME | End: 2021-04-22
Attending: INTERNAL MEDICINE | Admitting: INTERNAL MEDICINE

## 2021-04-22 VITALS
HEIGHT: 61 IN | HEART RATE: 118 BPM | BODY MASS INDEX: 21.34 KG/M2 | OXYGEN SATURATION: 92 % | WEIGHT: 113 LBS | DIASTOLIC BLOOD PRESSURE: 60 MMHG | TEMPERATURE: 96.6 F | SYSTOLIC BLOOD PRESSURE: 107 MMHG | RESPIRATION RATE: 18 BRPM

## 2021-04-22 DIAGNOSIS — Z86.010 PERSONAL HISTORY OF COLONIC POLYPS: ICD-10-CM

## 2021-04-22 PROCEDURE — 25010000002 PROPOFOL 200 MG/20ML EMULSION: Performed by: NURSE ANESTHETIST, CERTIFIED REGISTERED

## 2021-04-22 PROCEDURE — 88305 TISSUE EXAM BY PATHOLOGIST: CPT | Performed by: INTERNAL MEDICINE

## 2021-04-22 PROCEDURE — 45385 COLONOSCOPY W/LESION REMOVAL: CPT | Performed by: INTERNAL MEDICINE

## 2021-04-22 RX ORDER — PROPOFOL 10 MG/ML
INJECTION, EMULSION INTRAVENOUS AS NEEDED
Status: DISCONTINUED | OUTPATIENT
Start: 2021-04-22 | End: 2021-04-22 | Stop reason: SURG

## 2021-04-22 RX ORDER — SODIUM CHLORIDE 9 MG/ML
70 INJECTION, SOLUTION INTRAVENOUS CONTINUOUS PRN
Status: DISCONTINUED | OUTPATIENT
Start: 2021-04-22 | End: 2021-04-22 | Stop reason: HOSPADM

## 2021-04-22 RX ORDER — SODIUM CHLORIDE 0.9 % (FLUSH) 0.9 %
10 SYRINGE (ML) INJECTION AS NEEDED
Status: DISCONTINUED | OUTPATIENT
Start: 2021-04-22 | End: 2021-04-22 | Stop reason: HOSPADM

## 2021-04-22 RX ORDER — LIDOCAINE HYDROCHLORIDE 20 MG/ML
INJECTION, SOLUTION INTRAVENOUS AS NEEDED
Status: DISCONTINUED | OUTPATIENT
Start: 2021-04-22 | End: 2021-04-22 | Stop reason: SURG

## 2021-04-22 RX ADMIN — SODIUM CHLORIDE 70 ML/HR: 9 INJECTION, SOLUTION INTRAVENOUS at 08:11

## 2021-04-22 RX ADMIN — LIDOCAINE HYDROCHLORIDE 60 MG: 20 INJECTION, SOLUTION INTRAVENOUS at 08:58

## 2021-04-22 RX ADMIN — PROPOFOL 200 MG: 10 INJECTION, EMULSION INTRAVENOUS at 08:58

## 2021-04-24 LAB
LAB AP CASE REPORT: NORMAL
PATH REPORT.FINAL DX SPEC: NORMAL

## 2021-04-30 ENCOUNTER — OFFICE VISIT (OUTPATIENT)
Dept: INTERNAL MEDICINE | Facility: CLINIC | Age: 71
End: 2021-04-30

## 2021-04-30 VITALS
RESPIRATION RATE: 18 BRPM | TEMPERATURE: 97.3 F | SYSTOLIC BLOOD PRESSURE: 124 MMHG | DIASTOLIC BLOOD PRESSURE: 74 MMHG | HEIGHT: 61 IN | BODY MASS INDEX: 21.34 KG/M2 | OXYGEN SATURATION: 99 % | WEIGHT: 113 LBS | HEART RATE: 59 BPM

## 2021-04-30 DIAGNOSIS — K21.9 GASTROESOPHAGEAL REFLUX DISEASE WITHOUT ESOPHAGITIS: ICD-10-CM

## 2021-04-30 DIAGNOSIS — Z00.00 ENCOUNTER FOR PREVENTIVE HEALTH EXAMINATION: Primary | ICD-10-CM

## 2021-04-30 DIAGNOSIS — I10 ESSENTIAL HYPERTENSION: ICD-10-CM

## 2021-04-30 DIAGNOSIS — B18.2 CHRONIC HEPATITIS C WITHOUT HEPATIC COMA (HCC): ICD-10-CM

## 2021-04-30 DIAGNOSIS — Z13.820 SCREENING FOR OSTEOPOROSIS: ICD-10-CM

## 2021-04-30 DIAGNOSIS — E78.49 OTHER HYPERLIPIDEMIA: ICD-10-CM

## 2021-04-30 DIAGNOSIS — Z09 ENCOUNTER FOR FOLLOW-UP EXAMINATION AFTER COMPLETED TREATMENT FOR CONDITIONS OTHER THAN MALIGNANT NEOPLASM: ICD-10-CM

## 2021-04-30 PROCEDURE — 1160F RVW MEDS BY RX/DR IN RCRD: CPT | Performed by: INTERNAL MEDICINE

## 2021-04-30 PROCEDURE — 99397 PER PM REEVAL EST PAT 65+ YR: CPT | Performed by: INTERNAL MEDICINE

## 2021-04-30 PROCEDURE — 1170F FXNL STATUS ASSESSED: CPT | Performed by: INTERNAL MEDICINE

## 2021-04-30 PROCEDURE — 1125F AMNT PAIN NOTED PAIN PRSNT: CPT | Performed by: INTERNAL MEDICINE

## 2021-04-30 PROCEDURE — 96160 PT-FOCUSED HLTH RISK ASSMT: CPT | Performed by: INTERNAL MEDICINE

## 2021-04-30 PROCEDURE — G0439 PPPS, SUBSEQ VISIT: HCPCS | Performed by: INTERNAL MEDICINE

## 2021-04-30 RX ORDER — PANTOPRAZOLE SODIUM 40 MG/1
40 TABLET, DELAYED RELEASE ORAL DAILY
Qty: 30 TABLET | Refills: 5 | Status: SHIPPED | OUTPATIENT
Start: 2021-04-30 | End: 2021-06-17

## 2021-04-30 NOTE — PATIENT INSTRUCTIONS
Medicare Wellness  Personal Prevention Plan of Service     Date of Office Visit:  2021  Encounter Provider:  Garland Rodriguez DO  Place of Service:  [facility] PRIMARY [facility]ARE  Patient Name: Mya Hawkins  :  1950    As part of the Medicare Wellness portion of your visit today, we are providing you with this personalized preventive plan of services (PPPS). This plan is based upon recommendations of the [place] Preventive Services Task Force (USPSTF) and the Advisory Committee on Immunization Practices (ACIP).    This lists the preventive care services that should be considered, and provides dates of when you are due. Items listed as completed are up-to-date and do not require any further intervention.    Health Maintenance   Topic Date Due   • DXA SCAN  Never done   • Hepatitis B (1 of 3 - Risk 3-dose series) Never done   • ZOSTER VACCINE (1 of 2) Never done   • ANNUAL WELLNESS VISIT  2020   • LIPID PANEL  2020   • COVID-19 Vaccine (2 - Moderna 2-dose series) 2021   • MAMMOGRAM  2021   • INFLUENZA VACCINE  2021   • TDAP/TD VACCINES (4 - Td) 01/15/2029   • COLONOSCOPY  2031   • HEPATITIS C SCREENING  Completed   • Pneumococcal Vaccine 65+  Completed       No orders of the defined types were placed in this encounter.      No follow-ups on file.

## 2021-04-30 NOTE — PROGRESS NOTES
"QUICK REFERENCE INFORMATION:  The ABCs of the Annual Wellness Visit    Subsequent Medicare Wellness Visit    Chief Complaint   Patient presents with   • Medicare Wellness-subsequent     wellness        Subjective   History of Present Illness    Mya Hawkins is a 70 y.o. female who presents for a Subsequent Wellness Visit. In addition, we addressed the following health issues:    Had her colonoscopy recently, had several polyps removed, has follow up to discuss results next month.  Has been taking BP medications regularly and has no issues with this recently.  GERD symptoms still intermittently persist and she wishes to be able to take the medication as needed.     HEALTH RISK ASSESSMENT    1950    Recent Hospitalizations:  No hospitalization(s) within the last year..        Current Medical Providers:  Patient Care Team:  Garland Rodriguez DO as PCP - General (Internal Medicine)  Harris Lewis MD as Consulting Physician (General Surgery)        Smoking Status:  Social History     Tobacco Use   Smoking Status Current Every Day Smoker   • Packs/day: 0.50   • Years: 50.00   • Pack years: 25.00   • Types: Cigarettes   Smokeless Tobacco Never Used   Tobacco Comment    REPORTS SHE SMOKES \"OFF AND ON SINCE AGE 18\"       Alcohol Consumption:  Social History     Substance and Sexual Activity   Alcohol Use No    Comment: SOCIAL USE, REPORTS NO HX OF ETOH ABUSE. QUIT 12/2018       Depression Screen:   PHQ-2/PHQ-9 Depression Screening 7/16/2019   Little interest or pleasure in doing things 0   Feeling down, depressed, or hopeless 0   Total Score 0       Health Habits and Functional and Cognitive Screening:  Functional & Cognitive Status 4/30/2021   Do you have difficulty preparing food and eating? No   Do you have difficulty bathing yourself, getting dressed or grooming yourself? No   Do you have difficulty using the toilet? No   Do you have difficulty moving around from place to place? No   Do you have " trouble with steps or getting out of a bed or a chair? Yes   Current Diet Limited Junk Food   Dental Exam Up to date        Dental Exam Comment -   Eye Exam Up to date        Eye Exam Comment -   Exercise (times per week) 7 times per week   Current Exercise Activities Include Walking   Do you need help using the phone?  No   Are you deaf or do you have serious difficulty hearing?  No   Do you need help with transportation? Yes   Do you need help shopping? No   Do you need help preparing meals?  No   Do you need help with housework?  No   Do you need help with laundry? No   Do you need help taking your medications? No   Do you need help managing money? No   Do you ever drive or ride in a car without wearing a seat belt? No   Have you felt unusual stress, anger or loneliness in the last month? No   Who do you live with? Other   If you need help, do you have trouble finding someone available to you? No   Have you been bothered in the last four weeks by sexual problems? No   Do you have difficulty concentrating, remembering or making decisions? No           Does the patient have evidence of cognitive impairment? No    Aspirin use counseling: Taking ASA appropriately as indicated      Recent Lab Results:  CMP:  Lab Results   Component Value Date    GLU 82 07/16/2019    BUN 14 03/01/2021    CREATININE 0.88 03/01/2021    EGFRIFNONA 71 07/16/2019    EGFRIFAFRI 77 03/01/2021    BCR 15.9 03/01/2021     03/01/2021    K 3.6 03/01/2021    CO2 25.2 03/01/2021    CALCIUM 9.3 03/01/2021    PROTENTOTREF 7.9 07/16/2019    ALBUMIN 4.30 03/01/2021    LABGLOBREF 3.9 07/16/2019    LABIL2 1.0 07/16/2019    BILITOT 0.5 03/01/2021    ALKPHOS 91 03/01/2021    AST 16 03/01/2021    ALT 9 03/01/2021     Lipid Panel:  Lab Results   Component Value Date    TRIG 91 07/16/2019    HDL 60 07/16/2019    VLDL 18.2 07/16/2019     HbA1c:       Visual Acuity:  No exam data present    Age-appropriate Screening Schedule:  Refer to the list below for  future screening recommendations based on patient's age, sex and/or medical conditions. Orders for these recommended tests are listed in the plan section. The patient has been provided with a written plan.    Health Maintenance   Topic Date Due   • DXA SCAN  Never done   • ZOSTER VACCINE (1 of 2) Never done   • LIPID PANEL  07/16/2020   • MAMMOGRAM  05/09/2021   • INFLUENZA VACCINE  08/01/2021   • TDAP/TD VACCINES (4 - Td) 01/15/2029   • COLONOSCOPY  04/22/2031          The following portions of the patient's history were reviewed and updated as appropriate: allergies, current medications, past family history, past medical history, past social history, past surgical history and problem list.    Outpatient Medications Prior to Visit   Medication Sig Dispense Refill   • aspirin 81 MG EC tablet Take 1 tablet by mouth Daily. 30 tablet 5   • Biotin 10 MG capsule Take 10 mg by mouth Daily.     • bisacodyl 5 MG EC tablet Take  by mouth Take As Directed. For colonoscopy     • cetirizine (zyrTEC) 10 MG tablet Take 1 tablet by mouth Daily As Needed for Allergies. 90 tablet 1   • lisinopril (PRINIVIL,ZESTRIL) 20 MG tablet Take 1 tablet by mouth Daily. 90 tablet 0     No facility-administered medications prior to visit.       Patient Active Problem List   Diagnosis   • Essential hypertension   • Chronic neck pain   • Allergic rhinitis due to allergen   • Diarrhea   • Colon cancer screening   • Chronic pain of left knee   • Possible exposure to STD   • Vaginal burning   • Personal history of colonic polyps       Advance Care Planning:  ACP discussion was held with the patient during this visit. Patient has an advance directive (not in EMR), copy requested.    Identification of Risk Factors:  Risk factors include: Advance Directive Discussion.    Review of Systems   Constitutional: Negative for chills, fatigue and fever.   HENT: Negative for congestion, ear pain, rhinorrhea, sinus pressure and sore throat.    Eyes: Negative for  visual disturbance.   Respiratory: Negative for cough, chest tightness, shortness of breath and wheezing.    Cardiovascular: Negative for chest pain, palpitations and leg swelling.   Gastrointestinal: Negative for abdominal pain, blood in stool, constipation, diarrhea, nausea and vomiting.   Endocrine: Negative for polydipsia and polyuria.   Genitourinary: Negative for dysuria and hematuria.   Musculoskeletal: Positive for arthralgias. Negative for back pain.   Skin: Negative for rash.   Neurological: Negative for dizziness, light-headedness, numbness and headaches.   Psychiatric/Behavioral: Negative for dysphoric mood and sleep disturbance. The patient is not nervous/anxious.        Compared to one year ago, the patient feels her physical health is the same.  Compared to one year ago, the patient feels her mental health is the same.    Objective     Physical Exam  Vitals and nursing note reviewed.   Constitutional:       General: She is not in acute distress.     Appearance: Normal appearance. She is well-developed.   HENT:      Head: Normocephalic and atraumatic.      Right Ear: Tympanic membrane and external ear normal.      Left Ear: Tympanic membrane and external ear normal.      Nose: Nose normal.      Mouth/Throat:      Mouth: Mucous membranes are moist.      Pharynx: No oropharyngeal exudate.   Eyes:      General: No scleral icterus.     Conjunctiva/sclera: Conjunctivae normal.      Pupils: Pupils are equal, round, and reactive to light.   Neck:      Thyroid: No thyromegaly.      Vascular: No JVD.   Cardiovascular:      Rate and Rhythm: Normal rate and regular rhythm.      Heart sounds: Normal heart sounds.   Pulmonary:      Effort: Pulmonary effort is normal. No respiratory distress.      Breath sounds: Normal breath sounds. No stridor. No wheezing.   Abdominal:      General: Bowel sounds are normal. There is no distension.      Palpations: Abdomen is soft. There is no mass.      Tenderness: There is no  "abdominal tenderness. There is no guarding.   Genitourinary:     Comments: Deferred  Musculoskeletal:         General: No tenderness. Normal range of motion.      Cervical back: Normal range of motion and neck supple.      Comments: Left knee stiffness    Lymphadenopathy:      Cervical: No cervical adenopathy.   Skin:     General: Skin is warm and dry.   Neurological:      Mental Status: She is alert and oriented to person, place, and time.      Cranial Nerves: No cranial nerve deficit.   Psychiatric:         Behavior: Behavior normal.         Thought Content: Thought content normal.         Judgment: Judgment normal.         Vitals:    04/30/21 0927   BP: 124/74   Pulse: 59   Resp: 18   Temp: 97.3 °F (36.3 °C)   SpO2: 99%   Weight: 51.3 kg (113 lb)   Height: 154.9 cm (61\")   PainSc:   8   PainLoc: Knee       Patient's Body mass index is 21.35 kg/m². BMI is within normal parameters. No follow-up required..      Assessment/Plan   Patient Self-Management and Personalized Health Advice  The patient has been provided with information about: prevention of cardiac or vascular disease and designing advance directives and preventive services including:   · Annual Wellness Visit (AWV).    Visit Diagnoses:    ICD-10-CM ICD-9-CM   1. Encounter for preventive health examination  Z00.00 V70.0   2. Essential hypertension  I10 401.9   3. Chronic hepatitis C without hepatic coma (CMS/HCC)  B18.2 070.54   4. Other hyperlipidemia  E78.49 272.4   5. Screening for osteoporosis  Z13.820 V82.81   6. Gastroesophageal reflux disease without esophagitis  K21.9 530.81   7. Encounter for follow-up examination after completed treatment for conditions other than malignant neoplasm   Z09 V67.9       Discussion Summary:  Patient is a 70 y.o. female who presents for a Subsequent Wellness Visit.     1. Preventive Health Maintenance  - Baseline labs ordered per above.  - Vaccines reviewed and updated, just had first moderna vaccine, will hold on " other vaccines until covid vaccination is completed.   - Preventive health measures were discussed including: healthy diet with increase in fruits and vegetables, regular exercise at least 3 times a week, safe sex practices, avoidance of drugs, tobacco, and alcohol, and regular seatbelt use.  - DEXA ordered for screening.     2. Hypertension  - BP is stable on current medications    3.  Chronic left knee pain  - follows pain management    4. History of Hep C  - now cured with treatments from Gastroenterology.     5. GERD  - ok to use pantoprazole PRN.       Orders Placed This Encounter   Procedures   • DEXA Bone Density Axial     Order Specific Question:   Is patient taking or have taken long term Glucocorticoid (steroids)?     Answer:   No     Order Specific Question:   Does the patient have rheumatoid arthritis?     Answer:   No     Order Specific Question:   Reason for Exam:     Answer:   screening osteoporosis     Order Specific Question:   Release to patient     Answer:   Immediate   • Comprehensive Metabolic Panel     Order Specific Question:   Release to patient     Answer:   Immediate   • Lipid Panel   • CBC & Differential     Order Specific Question:   Manual Differential     Answer:   No       Outpatient Encounter Medications as of 4/30/2021   Medication Sig Dispense Refill   • aspirin 81 MG EC tablet Take 1 tablet by mouth Daily. 30 tablet 5   • Biotin 10 MG capsule Take 10 mg by mouth Daily.     • bisacodyl 5 MG EC tablet Take  by mouth Take As Directed. For colonoscopy     • cetirizine (zyrTEC) 10 MG tablet Take 1 tablet by mouth Daily As Needed for Allergies. 90 tablet 1   • lisinopril (PRINIVIL,ZESTRIL) 20 MG tablet Take 1 tablet by mouth Daily. 90 tablet 0   • pantoprazole (PROTONIX) 40 MG EC tablet Take 1 tablet by mouth Daily. 30 tablet 5     No facility-administered encounter medications on file as of 4/30/2021.       Reviewed use of high risk medication in the elderly: yes  Reviewed for potential  of harmful drug interactions in the elderly: yes    Follow Up:  Return in about 1 year (around 4/30/2022) for Medicare Wellness.     An After Visit Summary and PPPS with all of these plans were given to the patient.

## 2021-05-01 LAB
ALBUMIN SERPL-MCNC: 4.2 G/DL (ref 3.5–5.2)
ALBUMIN/GLOB SERPL: 1.2 G/DL
ALP SERPL-CCNC: 92 U/L (ref 39–117)
ALT SERPL-CCNC: 8 U/L (ref 1–33)
AST SERPL-CCNC: 13 U/L (ref 1–32)
BASOPHILS # BLD AUTO: 0.02 10*3/MM3 (ref 0–0.2)
BASOPHILS NFR BLD AUTO: 0.3 % (ref 0–1.5)
BILIRUB SERPL-MCNC: 0.4 MG/DL (ref 0–1.2)
BUN SERPL-MCNC: 10 MG/DL (ref 8–23)
BUN/CREAT SERPL: 11.4 (ref 7–25)
CALCIUM SERPL-MCNC: 9.7 MG/DL (ref 8.6–10.5)
CHLORIDE SERPL-SCNC: 104 MMOL/L (ref 98–107)
CHOLEST SERPL-MCNC: 189 MG/DL (ref 0–200)
CO2 SERPL-SCNC: 26.1 MMOL/L (ref 22–29)
CREAT SERPL-MCNC: 0.88 MG/DL (ref 0.57–1)
EOSINOPHIL # BLD AUTO: 0.39 10*3/MM3 (ref 0–0.4)
EOSINOPHIL NFR BLD AUTO: 4.9 % (ref 0.3–6.2)
ERYTHROCYTE [DISTWIDTH] IN BLOOD BY AUTOMATED COUNT: 12.8 % (ref 12.3–15.4)
GLOBULIN SER CALC-MCNC: 3.4 GM/DL
GLUCOSE SERPL-MCNC: 90 MG/DL (ref 65–99)
HCT VFR BLD AUTO: 44.3 % (ref 34–46.6)
HDLC SERPL-MCNC: 69 MG/DL (ref 40–60)
HGB BLD-MCNC: 15 G/DL (ref 12–15.9)
IMM GRANULOCYTES # BLD AUTO: 0.02 10*3/MM3 (ref 0–0.05)
IMM GRANULOCYTES NFR BLD AUTO: 0.3 % (ref 0–0.5)
LDLC SERPL CALC-MCNC: 107 MG/DL (ref 0–100)
LYMPHOCYTES # BLD AUTO: 2.94 10*3/MM3 (ref 0.7–3.1)
LYMPHOCYTES NFR BLD AUTO: 36.8 % (ref 19.6–45.3)
MCH RBC QN AUTO: 30 PG (ref 26.6–33)
MCHC RBC AUTO-ENTMCNC: 33.9 G/DL (ref 31.5–35.7)
MCV RBC AUTO: 88.6 FL (ref 79–97)
MONOCYTES # BLD AUTO: 0.34 10*3/MM3 (ref 0.1–0.9)
MONOCYTES NFR BLD AUTO: 4.3 % (ref 5–12)
NEUTROPHILS # BLD AUTO: 4.28 10*3/MM3 (ref 1.7–7)
NEUTROPHILS NFR BLD AUTO: 53.4 % (ref 42.7–76)
NRBC BLD AUTO-RTO: 0 /100 WBC (ref 0–0.2)
PLATELET # BLD AUTO: 195 10*3/MM3 (ref 140–450)
POTASSIUM SERPL-SCNC: 3.4 MMOL/L (ref 3.5–5.2)
PROT SERPL-MCNC: 7.6 G/DL (ref 6–8.5)
RBC # BLD AUTO: 5 10*6/MM3 (ref 3.77–5.28)
SODIUM SERPL-SCNC: 140 MMOL/L (ref 136–145)
TRIGL SERPL-MCNC: 69 MG/DL (ref 0–150)
VLDLC SERPL CALC-MCNC: 13 MG/DL (ref 5–40)
WBC # BLD AUTO: 7.99 10*3/MM3 (ref 3.4–10.8)

## 2021-05-07 ENCOUNTER — APPOINTMENT (OUTPATIENT)
Dept: BONE DENSITY | Facility: HOSPITAL | Age: 71
End: 2021-05-07

## 2021-05-07 PROCEDURE — 77080 DXA BONE DENSITY AXIAL: CPT

## 2021-05-11 ENCOUNTER — TELEPHONE (OUTPATIENT)
Dept: INTERNAL MEDICINE | Facility: CLINIC | Age: 71
End: 2021-05-11

## 2021-05-11 NOTE — TELEPHONE ENCOUNTER
PATIENT WOULD LIKE A COPY OF HER BONE DENSITY AND THE INSTRUCTIONS THAT WERE GIVEN FOR THAT ON THE EXERCISE AND VITAMIN D.     MAIL TO:  [address]  [facility] [address]    CALL IF NEEDED [phone]

## 2021-06-17 ENCOUNTER — OFFICE VISIT (OUTPATIENT)
Dept: GASTROENTEROLOGY | Facility: CLINIC | Age: 71
End: 2021-06-17

## 2021-06-17 VITALS
WEIGHT: 116 LBS | BODY MASS INDEX: 21.9 KG/M2 | HEART RATE: 53 BPM | TEMPERATURE: 97.5 F | HEIGHT: 61 IN | SYSTOLIC BLOOD PRESSURE: 137 MMHG | DIASTOLIC BLOOD PRESSURE: 55 MMHG

## 2021-06-17 DIAGNOSIS — Z86.19 HISTORY OF HEPATITIS C: Primary | ICD-10-CM

## 2021-06-17 DIAGNOSIS — D12.6 ADENOMATOUS POLYP OF COLON, UNSPECIFIED PART OF COLON: ICD-10-CM

## 2021-06-17 PROCEDURE — 99213 OFFICE O/P EST LOW 20 MIN: CPT | Performed by: INTERNAL MEDICINE

## 2021-06-17 RX ORDER — HYDROCODONE BITARTRATE AND ACETAMINOPHEN 5; 325 MG/1; MG/1
1 TABLET ORAL EVERY 6 HOURS PRN
COMMUNITY
End: 2022-05-03

## 2021-06-17 NOTE — PROGRESS NOTES
"     Follow Up Note     Date: 2021   Patient Name: yMa Hawkins  MRN: [id number]  : 1950     Referring Physician: Garland Rodriguez DO    Chief Complaint:    Chief Complaint   Patient presents with   • Follow-up   • Hepatitis       Interval History:   2021  Mya Hawkins is a 71 y.o. female who is here today for follow up after colonoscopy.  Denies any reflux symptoms now.  No abdominal pain no change in bowel habit no nausea vomiting.  She also had a follow-up hep C viral load done she is here to discuss the reports and further plan.    2021  Mya Hawkins is a 70 y.o. female who is here today for follow up for her ongoing chronic hep C treatment.  Patient denies any current GI symptoms  She had a recent lab work done she is here to discuss the report.  She finished her treatment in 2020  Mya Hawkins is a 70 y.o. female who is here today for follow-up for her chronic hep C infection.  She finished her hep C treatment 3 months ago.   Denies any new abdominal symptoms.  She is here for follow-up.      2020  Mya Hawkins is a 70 y.o. female who is here today for follow up for follow up for Chronic hep C.  She is here to discuss the recent lab work report after hep C treatment.  Deny new symptoms     2020  The patient has history of chronic hep C (genotype 1b) diagnosed about 2-3 years ago.  The patient had blood transfusion in the 1980s.  There is no history of drug use.  She denies history of tattoos.  There is history of significant alcohol use over the years.  The patient started drinking alcohol at age 18.  She used to drink about 4-5 drinks usually 4-5 times a week.  The patient claims that she \"quit alcohol in \".  However the patient switched to \"nonalcoholic beer\".  She was drinking 4 to 5 cans of nonalcoholic beer on daily basis.  She finally quit nonalcoholic beer as well in 2018.  The patient " "has been vaccinated against hepatitis A.  It appears that the patient had been exposed to hepatitis B in the past, got rid of it and is currently immune to it.  There is no history of jaundice.  She denies darkening of urine color, lightening of stool color or pruritus.  The patient was also found to be positive for DAE.     The patient denies further diarrhea.  Currently she is been having 1 and occasionally 2 formed stools.  She denies reflux.  There is no dysphagia or odynophagia.  There is no history of hematemesis, melena or hematochezia.  She denies recent weight loss       Subjective      Past Medical History:   Past Medical History:   Diagnosis Date   • Body piercing     EARS   • COPD (chronic obstructive pulmonary disease) (CMS/HCC)    • Hay fever     REPORTS \"WHEN I LIVED IN [place] IN THE EARLY SPRING AND THE EARLY FALL\"   • Hay fever    • Hepatitis C    • History of bronchitis    • History of fracture     Left wrist - no surgery was required   • History of transfusion     REPORTS SHE THINKS SHE HAS HAD A TRANSFUSION IN THE PAST BUT IS NOT CERTAIN. DID REPORTS IF SHE HAD A TRANSFUSION THAT THERE IS NO KNOWN REACTION   • Hypertension    • Seasonal allergies    • Wears glasses     RX   • Wears partial dentures     REPORTS SHE WEARS A PARTIAL IN LOWER MOUTH - INSTRUCTED NO ADHESIVES THE DOS     Past Surgical History:   Past Surgical History:   Procedure Laterality Date   • CATARACT EXTRACTION Left    • COLONOSCOPY     • COLONOSCOPY N/A 8/16/2018    Procedure: COLONOSCOPY hot snare polypectomy x2, saline injection, cold biopsies, tattoo injection, resolution clip x3;  Surgeon: Sen Morley MD;  Location: [facility] ENDOSCOPY;  Service: Gastroenterology   • COLONOSCOPY N/A 4/22/2021    Procedure: COLONOSCOPY WITH COLD SNARE POLYPECTOMY;  Surgeon: Dario Mcallister MD;  Location: [facility] ENDOSCOPY;  Service: Gastroenterology;  Laterality: N/A;   • HAND SURGERY Left     Patient reported repair left thumb (was " "unsure if trigger release)   • JOINT REPLACEMENT Left     KNEE REPLACEMENT, THEN LATER HAD REVISION OF LEFT KNEE   • MOUTH SURGERY      Oral extractions   • TONSILLECTOMY     • TUBAL ABDOMINAL LIGATION     • WISDOM TOOTH EXTRACTION         Family History:   Family History   Problem Relation Age of Onset   • Diabetes Mother    • Heart attack Mother    • Stroke Mother    • Diabetes Father    • Heart attack Father    • Stroke Father    • Hyperlipidemia Father    • Hypertension Father    • Colon polyps Brother    • Diabetes Brother    • Cancer Paternal Aunt         stomach   • Diabetes Sister    • Colon cancer Neg Hx        Social History:   Social History     Socioeconomic History   • Marital status:      Spouse name: Not on file   • Number of children: Not on file   • Years of education: Not on file   • Highest education level: Not on file   Tobacco Use   • Smoking status: Current Every Day Smoker     Packs/day: 0.50     Years: 50.00     Pack years: 25.00     Types: Cigarettes   • Smokeless tobacco: Never Used   • Tobacco comment: REPORTS SHE SMOKES \"OFF AND ON SINCE AGE 18\"   Substance and Sexual Activity   • Alcohol use: No     Comment: SOCIAL USE, REPORTS NO HX OF ETOH ABUSE. QUIT 12/2018   • Drug use: No   • Sexual activity: Defer       Medications:     Current Outpatient Medications:   •  aspirin 81 MG EC tablet, Take 1 tablet by mouth Daily., Disp: 30 tablet, Rfl: 5  •  Biotin 10 MG capsule, Take 10 mg by mouth Daily., Disp: , Rfl:   •  cetirizine (zyrTEC) 10 MG tablet, Take 1 tablet by mouth Daily As Needed for Allergies., Disp: 90 tablet, Rfl: 1  •  HYDROcodone-acetaminophen (NORCO) 5-325 MG per tablet, Take 1 tablet by mouth Every 6 (Six) Hours As Needed., Disp: , Rfl:   •  lisinopril (PRINIVIL,ZESTRIL) 20 MG tablet, Take 1 tablet by mouth Daily., Disp: 90 tablet, Rfl: 0  •  pantoprazole (PROTONIX) 40 MG EC tablet, Take 1 tablet by mouth Daily., Disp: 30 tablet, Rfl: 5    Allergies:   No Known " "Allergies    Review of Systems:   Review of Systems   Constitutional: Negative for appetite change, fatigue and unexpected weight loss.   Gastrointestinal: Negative for abdominal distention, abdominal pain, anal bleeding, blood in stool, constipation, diarrhea, nausea, rectal pain, vomiting, GERD and indigestion.       The following portions of the patient's history were reviewed and updated as appropriate: allergies, current medications, past family history, past medical history, past social history, past surgical history and problem list.    Objective     Physical Exam:  Vital Signs:   Vitals:    06/17/21 1403   BP: 137/55   Pulse: 53   Temp: 97.5 °F (36.4 °C)   TempSrc: Temporal   Weight: 52.6 kg (116 lb)   Height: 154.9 cm (61\")       Physical Exam  Vitals and nursing note reviewed.   Constitutional:       Appearance: She is well-developed.   Eyes:      Conjunctiva/sclera: Conjunctivae normal.   Cardiovascular:      Rate and Rhythm: Normal rate and regular rhythm.      Heart sounds: No murmur heard.     Pulmonary:      Effort: Pulmonary effort is normal. No respiratory distress.      Breath sounds: Normal breath sounds.   Abdominal:      General: Bowel sounds are normal. There is no distension.      Palpations: Abdomen is soft. There is no mass.      Tenderness: There is no abdominal tenderness.      Hernia: No hernia is present.   Musculoskeletal:      Cervical back: Normal range of motion and neck supple.   Lymphadenopathy:      Cervical: No cervical adenopathy.   Skin:     General: Skin is warm and dry.   Neurological:      General: No focal deficit present.      Mental Status: She is alert and oriented to person, place, and time.      Sensory: No sensory deficit.         Results Review:   I reviewed the patient's new clinical results.    Office Visit on 04/30/2021   Component Date Value Ref Range Status   • WBC 04/30/2021 7.99  3.40 - 10.80 10*3/mm3 Final   • RBC 04/30/2021 5.00  3.77 - 5.28 10*6/mm3 Final "   • Hemoglobin 04/30/2021 15.0  12.0 - 15.9 g/dL Final   • Hematocrit 04/30/2021 44.3  34.0 - 46.6 % Final   • MCV 04/30/2021 88.6  79.0 - 97.0 fL Final   • MCH 04/30/2021 30.0  26.6 - 33.0 pg Final   • MCHC 04/30/2021 33.9  31.5 - 35.7 g/dL Final   • RDW 04/30/2021 12.8  12.3 - 15.4 % Final   • Platelets 04/30/2021 195  140 - 450 10*3/mm3 Final   • Neutrophil Rel % 04/30/2021 53.4  42.7 - 76.0 % Final   • Lymphocyte Rel % 04/30/2021 36.8  19.6 - 45.3 % Final   • Monocyte Rel % 04/30/2021 4.3* 5.0 - 12.0 % Final   • Eosinophil Rel % 04/30/2021 4.9  0.3 - 6.2 % Final   • Basophil Rel % 04/30/2021 0.3  0.0 - 1.5 % Final   • Neutrophils Absolute 04/30/2021 4.28  1.70 - 7.00 10*3/mm3 Final   • Lymphocytes Absolute 04/30/2021 2.94  0.70 - 3.10 10*3/mm3 Final   • Monocytes Absolute 04/30/2021 0.34  0.10 - 0.90 10*3/mm3 Final   • Eosinophils Absolute 04/30/2021 0.39  0.00 - 0.40 10*3/mm3 Final   • Basophils Absolute 04/30/2021 0.02  0.00 - 0.20 10*3/mm3 Final   • Immature Granulocyte Rel % 04/30/2021 0.3  0.0 - 0.5 % Final   • Immature Grans Absolute 04/30/2021 0.02  0.00 - 0.05 10*3/mm3 Final   • nRBC 04/30/2021 0.0  0.0 - 0.2 /100 WBC Final   • Glucose 04/30/2021 90  65 - 99 mg/dL Final   • BUN 04/30/2021 10  8 - 23 mg/dL Final   • Creatinine 04/30/2021 0.88  0.57 - 1.00 mg/dL Final   • eGFR Non  Am 04/30/2021 64  >60 mL/min/1.73 Final    Comment: GFR Normal >60  Chronic Kidney Disease <60  Kidney Failure <15     • eGFR  Am 04/30/2021 77  >60 mL/min/1.73 Final   • BUN/Creatinine Ratio 04/30/2021 11.4  7.0 - 25.0 Final   • Sodium 04/30/2021 140  136 - 145 mmol/L Final   • Potassium 04/30/2021 3.4* 3.5 - 5.2 mmol/L Final   • Chloride 04/30/2021 104  98 - 107 mmol/L Final   • Total CO2 04/30/2021 26.1  22.0 - 29.0 mmol/L Final   • Calcium 04/30/2021 9.7  8.6 - 10.5 mg/dL Final   • Total Protein 04/30/2021 7.6  6.0 - 8.5 g/dL Final   • Albumin 04/30/2021 4.20  3.50 - 5.20 g/dL Final   • Globulin 04/30/2021 3.4   gm/dL Final   • A/G Ratio 04/30/2021 1.2  g/dL Final   • Total Bilirubin 04/30/2021 0.4  0.0 - 1.2 mg/dL Final   • Alkaline Phosphatase 04/30/2021 92  39 - 117 U/L Final   • AST (SGOT) 04/30/2021 13  1 - 32 U/L Final   • ALT (SGPT) 04/30/2021 8  1 - 33 U/L Final   • Total Cholesterol 04/30/2021 189  0 - 200 mg/dL Final    Comment: Cholesterol Reference Ranges  (U.S. Department of Health and Human Services ATP III  Classifications)  Desirable          <200 mg/dL  Borderline High    200-239 mg/dL  High Risk          >240 mg/dL  Triglyceride Reference Ranges  (U.S. Department of Health and Human Services ATP III  Classifications)  Normal           <150 mg/dL  Borderline High  150-199 mg/dL  High             200-499 mg/dL  Very High        >500 mg/dL  HDL Reference Ranges  (U.S. Department of Health and Human Services ATP III  Classifcations)  Low     <40 mg/dl (major risk factor for CHD)  High    >60 mg/dl ('negative' risk factor for CHD)  LDL Reference Ranges  (U.S. Department of Health and Human Services ATP III  Classifcations)  Optimal          <100 mg/dL  Near Optimal     100-129 mg/dL  Borderline High  130-159 mg/dL  High             160-189 mg/dL  Very High        >189 mg/dL     • Triglycerides 04/30/2021 69  0 - 150 mg/dL Final   • HDL Cholesterol 04/30/2021 69* 40 - 60 mg/dL Final   • VLDL Cholesterol Steven 04/30/2021 13  5 - 40 mg/dL Final   • LDL Chol Calc (RUST) 04/30/2021 107* 0 - 100 mg/dL Final   Admission on 04/22/2021, Discharged on 04/22/2021   Component Date Value Ref Range Status   • Case Report 04/22/2021    Final                    Value:Surgical Pathology Report                         Case: [id number]                                  Authorizing Provider:  Dario Mcallister MD  Collected:           04/22/2021 09:05 AM          Ordering Location:     [facility]    Received:            04/22/2021 02:54 PM                                 [facility]                                                                     Pathologist:           Manjeet Farris MD                                                       Specimens:   1) - Large Intestine, Cecum, APPENDICILE ORRIFICE POLYP X2                                          2) - Large Intestine, Left / Descending Colon                                                       3) - Large Intestine, Sigmoid Colon, RECTAL SIGMOID POLYPX2                               • Final Diagnosis 04/22/2021    Final                    Value:This result contains rich text formatting which cannot be displayed here.   Lab on 04/19/2021   Component Date Value Ref Range Status   • SARS-CoV-2 MAXI 04/19/2021 Not Detected  Not Detected Final   Lab on 03/22/2021   Component Date Value Ref Range Status   • SARS-CoV-2 MAXI 03/22/2021 Not Detected  Not Detected Final      DEXA Bone Density Axial    Result Date: 5/7/2021  The bone mineral densities within the lumbar spine and left femoral neck are within the range of osteopenia.  Images were reviewed, interpreted, and dictated by Dr. Garibay. Transcribed by Leyla Rojas PA-C.  This report was finalized on 5/7/2021 1:39 PM by Clari Garibay M.D..    4/22/2021  - One 4 mm polyp at the appendiceal orifice, removed with a cold snare. Resected and retrieved.  - One 3 mm polyp in the cecum, removed with a cold snare. Resected and retrieved.  - One 6 mm polyp in the proximal descending colon, removed with a cold snare. Resected and retrieved.  - A few 4 to 6 mm polyps in the rectum and at the recto-sigmoid colon, two removed with a cold snare. Resected  and retrieved. clinically hyperplastic  - Diverticulosis in the sigmoid colon.    Laboratory Testing:  Upon review of medical records:     Dated July 13, 2018 sodium 140 potassium 4.2 chloride 105 CO2 27 BUN 11 serum creatinine 0.80 glucose 88.  Calcium 9.8.  Total protein 8.1.  Albumin 4.10.  T bili 0.8 AST 41 ALT 33 alkaline phosphatase 75.  Total cholesterol 142.   Triglycerides 67.  Hepatitis A IgM negative.  Hepatitis B surface antigen negative.  Hepatitis B core IgM negative.  Hepatitis C virus antibody positive at >11.0.     Dated July 18, 2018 HCV by PCR quantitative 2,700,000 IU/mL.  6.431 log 10 IU/mL.   Dated September 27, 2018 sodium 142 potassium 4.1 chloride 107 CO2 28 BUN 11 serum creatinine 0.80 glucose 89.  Calcium 9.9.  Total protein 9.0.  Albumin 4.70.  T bili 0.4 AST 41 ALT 48 alkaline phosphatase 102.  Serum iron 87.  TIBC 375.  Iron saturation 23%.  Ferritin 339.00.  Alpha-1 antitrypsin level 185.  Phenotype MM.  DAE direct positive.  Smooth muscle antibody negative at 16.  Mitochondrial antibody negative at <20.0.  Hepatitis A IgM negative.  Hepatitis A total antibody negative.  Hepatitis B core IgM negative.  Hepatitis B core total antibody positive.  Hepatitis B surface antibody reactive.  Hepatitis B surface antigen negative.     Dated January 31, 2019 HCV Fibrosure: Fibrosis score 0.64 (elevated).  Fibrosis stage F3-bridging fibrosis with many septa.  Necroinflammatory activity score elevated at 0.23, activity grade A0 to A1.  Alpha-2-macroglobulin quantitative elevated at 344.  Haptoglobin 41.  GGT elevated at 85.Apolipoprotein A-1 level at 197.     Dated April 2, 2019 TSH 0.485.  Vitamin B12 level 375.    Dated April 15, 2019 WBC 6.69 hemoglobin 15.4 hematocrit 45.9 MCV 88.3 platelet count 211.  PT 13.3.  INR 0.98.  PTT 33.2.     Dated April 30, 2019 sodium 135 potassium 4.0 chloride 106 CO2 27 BUN 10 serum creatinine 0.90 glucose 91.  Calcium 9.6.  Total protein 8.1.  Albumin 4.10.  T bili 0.5 AST 41 ALT 31 alkaline phosphatase 87.  HCVRNA by PCR quantitative 132, 000 IU/mL.  5.121 log 10 IU/mL.  Genotype 1b.     Dated June 11, 2019 sodium 141 potassium 4.1 chloride 105 CO2 30 BUN 12 serum creatinine 0.90 glucose 97.  Calcium 9.5.  Total protein 8.4.  Albumin 4.20.  T bili 0.4 AST 22 ALT 15 alkaline phosphatase 79.  WBC 7.39 hemoglobin 14.5  hematocrit 43.4 MCV 88.4 and platelet count 176.     Abdominal Imaging:  Upon review of medical records:     Dated April 15, 2019 the patient underwent a CT-guided liver biopsy which revealed: Mild chronic hepatitis (grade 1/4, stage I to early 2/4).  Negative for malignancy.  Sections in the current K showed chronic hepatitis that is fairly mild.  The portal areas are expanded by chronic inflammatory infiltrate, but interface hepatitis, piecemeal necrosis, and overt hepatocyte destruction are not negligible.  There is mild fibrosis (early oneal-portal/portal-portal septae) in one area, but discrete nodular architectural distortion and definite sclerosis are not identified in the specimen.  This is further evidenced by the trichrome stain, which shows only expanded portal areas and one single portal area with early portal septae.  Iron is present within the iron stain (overall mild).     Procedures:  Upon review of medical records:     Dated August 16, 2018 the patient underwent a colonoscopy to the terminal ileum which revealed: Scant diverticulosis.  Colon polyps.  2 were removed 10-12 mm in size.  Internal hemorrhoids.  No endoscopic evidence of colitis was seen.  Random biopsies were obtained from the colon upon withdrawal of scope. Transverse colon polyp, hot snare, biopsy revealed tubular adenoma.  Negative for high-grade dysplasia or malignancy.  Ascending colon, proximal polypoid area and cecal junction, biopsy revealed benign colonic mucosa with an intramucosal lymphoid aggregate.  Negative for dysplasia or malignancy.  Hepatic flexure polyp, hot snare, biopsy revealed fragments of tubular adenoma.  Negative for high-grade dysplasia or malignancy. Random colon, biopsies revealed benign colonic mucosa with no diagnostic abdomen abnormalities.  Negative for chronic or active colitis including lymphocytic or collagen is colitis.  Negative for dysplasia or malignancy.            Assessment / Plan       1.  Chronic hep C infection  6/20/2021  She finished her hep C treatment in August 2020 . Hep C viral load done in 3/1/2021 is undetectable.  She achieved a sustained virological response with clearance of hep C virus. Her hep C FibroSure revealed F3 fibrosis .  Due to her advanced fibrosis we will keep her on HCC surveillance with ultrasound at least once in a year from now.  Ultrasound abdomen in 1 year time    2/24/2021  Denies any current abdominal symptoms.  Her hep C viral load 3 months after treatment was undetectable.   She needs a hep C viral load 6 months after treatment and will schedule that now.   Given her F3 fibrosis we will monitor her at least once in the ER with ultrasound even after treatment     11/25/2020  Denies any new symptoms.  Her end up treatment hep C viral load was undetected.  She needs a hep C viral load 3 months after treatment now and 6 months after treatment to confirm the eradication of the hep C infection. Her hep C fibrosure showed F3 fibrosis however her liver biopsy did not reveal any significant fibrosis.  She had a mild steatohepatitis with minimal fibrosis without any evidence of cirrhosis.   Follow-up in 3 months time     8/12/2020  Genotype 1b, F3 fibrosis  She was treated with the Mavyret at 8-week course of treatment.  No adverse reactions from the medication.  No current or new GI symptoms  Her end of treatment hep C viral load undetectable  We will get SVR 12, in 3 months time and SVR 24 in 6 months time  Follow-up in 3 months     2.  Adenomatous colon polyp  6/17/2021   she had a colonoscopy done on 04/22/2029 with multiple polyps removed.  4 mm polyp in the cecum was tubular adenoma without any dysplasia.  1 more polyp in the cecum was sessile serrated adenoma without dysplasia.  . Descending colon polyp and rectal polyps were hyperplastic and lymphoid aggregate.    She needs a repeat surveillance colonoscopy in 2026 in 5 years time.    2/24/2021  Last  colonoscopy done in 2018 revealed a advanced polyp more than 1 cm in size, tubular adenomas.  Dr. Drummond advised repeat colonoscopy in 2021 and will schedule the same  The indications, technique, alternatives and potential risk and complications were discussed with the patient including but not limited to bleeding, bowel perforations, missing lesions and anesthetic complications. The patient understands and wishes to proceed with the procedure and has given their verbal consent. Written patient education information was given to the patient.   The patient will call if they have further questions before procedure.               Follow Up:   No follow-ups on file.      Dario Mcallister MD  [facility] [place]  6/17/2021  14:05 EDT     Please note that portions of this note may have been completed with a voice recognition program.

## 2021-08-09 DIAGNOSIS — I10 ESSENTIAL HYPERTENSION: ICD-10-CM

## 2021-08-09 RX ORDER — LISINOPRIL 20 MG/1
20 TABLET ORAL DAILY
Qty: 90 TABLET | Refills: 3 | Status: SHIPPED | OUTPATIENT
Start: 2021-08-09 | End: 2022-05-10

## 2021-08-09 NOTE — TELEPHONE ENCOUNTER
Caller: Mya Hawkins    Relationship: Self    Best call back number: [phone]    Medication needed:     lisinopril (PRINIVIL,ZESTRIL) 20 MG tablet    When do you need the refill by: ASAP  What additional details did the patient provide when requesting the medication: N/A    Does the patient have less than a 3 day supply:  [x] Yes  [] No    What is the patient's preferred pharmacy: [address] [phone] [place] [phone]

## 2021-08-09 NOTE — TELEPHONE ENCOUNTER
Rx Refill Note  Requested Prescriptions     Pending Prescriptions Disp Refills   • lisinopril (PRINIVIL,ZESTRIL) 20 MG tablet 90 tablet 3     Sig: Take 1 tablet by mouth Daily.      Last office visit with prescribing clinician: 4/30/2021      Next office visit with prescribing clinician: 5/3/2022            Mary Ortiz LPN  08/09/21, 15:21 EDT

## 2021-11-26 DIAGNOSIS — K21.9 GASTROESOPHAGEAL REFLUX DISEASE WITHOUT ESOPHAGITIS: ICD-10-CM

## 2021-11-30 RX ORDER — PANTOPRAZOLE SODIUM 40 MG/1
40 TABLET, DELAYED RELEASE ORAL DAILY
Qty: 30 TABLET | Refills: 5 | OUTPATIENT
Start: 2021-11-30

## 2022-05-03 ENCOUNTER — OFFICE VISIT (OUTPATIENT)
Dept: INTERNAL MEDICINE | Facility: CLINIC | Age: 72
End: 2022-05-03

## 2022-05-03 VITALS
HEIGHT: 61 IN | DIASTOLIC BLOOD PRESSURE: 63 MMHG | BODY MASS INDEX: 20.96 KG/M2 | HEART RATE: 52 BPM | WEIGHT: 111 LBS | OXYGEN SATURATION: 100 % | TEMPERATURE: 97.7 F | SYSTOLIC BLOOD PRESSURE: 121 MMHG | RESPIRATION RATE: 16 BRPM

## 2022-05-03 DIAGNOSIS — Z00.00 ENCOUNTER FOR PREVENTIVE HEALTH EXAMINATION: ICD-10-CM

## 2022-05-03 DIAGNOSIS — I10 ESSENTIAL HYPERTENSION: ICD-10-CM

## 2022-05-03 DIAGNOSIS — Z09 ENCOUNTER FOR FOLLOW-UP EXAMINATION AFTER COMPLETED TREATMENT FOR CONDITIONS OTHER THAN MALIGNANT NEOPLASM: ICD-10-CM

## 2022-05-03 DIAGNOSIS — F17.210 SMOKING GREATER THAN 40 PACK YEARS: Primary | ICD-10-CM

## 2022-05-03 DIAGNOSIS — E78.49 OTHER HYPERLIPIDEMIA: ICD-10-CM

## 2022-05-03 DIAGNOSIS — Z13.820 SCREENING FOR OSTEOPOROSIS: ICD-10-CM

## 2022-05-03 DIAGNOSIS — K21.9 GASTROESOPHAGEAL REFLUX DISEASE WITHOUT ESOPHAGITIS: ICD-10-CM

## 2022-05-03 DIAGNOSIS — B18.2 CHRONIC HEPATITIS C WITHOUT HEPATIC COMA: ICD-10-CM

## 2022-05-03 LAB
ALBUMIN SERPL-MCNC: 3.9 G/DL (ref 3.5–5.2)
ALBUMIN/GLOB SERPL: 1.3 G/DL
ALP SERPL-CCNC: 75 U/L (ref 39–117)
ALT SERPL-CCNC: 8 U/L (ref 1–33)
AST SERPL-CCNC: 17 U/L (ref 1–32)
BASOPHILS # BLD AUTO: 0.02 10*3/MM3 (ref 0–0.2)
BASOPHILS NFR BLD AUTO: 0.3 % (ref 0–1.5)
BILIRUB SERPL-MCNC: 0.4 MG/DL (ref 0–1.2)
BUN SERPL-MCNC: 11 MG/DL (ref 8–23)
BUN/CREAT SERPL: 10.7 (ref 7–25)
CALCIUM SERPL-MCNC: 9.2 MG/DL (ref 8.6–10.5)
CHLORIDE SERPL-SCNC: 107 MMOL/L (ref 98–107)
CHOLEST SERPL-MCNC: 162 MG/DL (ref 0–200)
CO2 SERPL-SCNC: 23.8 MMOL/L (ref 22–29)
CREAT SERPL-MCNC: 1.03 MG/DL (ref 0.57–1)
EGFRCR SERPLBLD CKD-EPI 2021: 58.3 ML/MIN/1.73
EOSINOPHIL # BLD AUTO: 0.57 10*3/MM3 (ref 0–0.4)
EOSINOPHIL NFR BLD AUTO: 7.2 % (ref 0.3–6.2)
ERYTHROCYTE [DISTWIDTH] IN BLOOD BY AUTOMATED COUNT: 12.4 % (ref 12.3–15.4)
GLOBULIN SER CALC-MCNC: 3.1 GM/DL
GLUCOSE SERPL-MCNC: 90 MG/DL (ref 65–99)
HCT VFR BLD AUTO: 42.1 % (ref 34–46.6)
HDLC SERPL-MCNC: 55 MG/DL (ref 40–60)
HGB BLD-MCNC: 14.1 G/DL (ref 12–15.9)
IMM GRANULOCYTES # BLD AUTO: 0.02 10*3/MM3 (ref 0–0.05)
IMM GRANULOCYTES NFR BLD AUTO: 0.3 % (ref 0–0.5)
LDLC SERPL CALC-MCNC: 96 MG/DL (ref 0–100)
LYMPHOCYTES # BLD AUTO: 2.85 10*3/MM3 (ref 0.7–3.1)
LYMPHOCYTES NFR BLD AUTO: 36.1 % (ref 19.6–45.3)
MCH RBC QN AUTO: 30.1 PG (ref 26.6–33)
MCHC RBC AUTO-ENTMCNC: 33.5 G/DL (ref 31.5–35.7)
MCV RBC AUTO: 90 FL (ref 79–97)
MONOCYTES # BLD AUTO: 0.42 10*3/MM3 (ref 0.1–0.9)
MONOCYTES NFR BLD AUTO: 5.3 % (ref 5–12)
NEUTROPHILS # BLD AUTO: 4.01 10*3/MM3 (ref 1.7–7)
NEUTROPHILS NFR BLD AUTO: 50.8 % (ref 42.7–76)
NRBC BLD AUTO-RTO: 0 /100 WBC (ref 0–0.2)
PLATELET # BLD AUTO: 190 10*3/MM3 (ref 140–450)
POTASSIUM SERPL-SCNC: 3.8 MMOL/L (ref 3.5–5.2)
PROT SERPL-MCNC: 7 G/DL (ref 6–8.5)
RBC # BLD AUTO: 4.68 10*6/MM3 (ref 3.77–5.28)
SODIUM SERPL-SCNC: 140 MMOL/L (ref 136–145)
TRIGL SERPL-MCNC: 51 MG/DL (ref 0–150)
VLDLC SERPL CALC-MCNC: 11 MG/DL (ref 5–40)
WBC # BLD AUTO: 7.89 10*3/MM3 (ref 3.4–10.8)

## 2022-05-03 PROCEDURE — 1159F MED LIST DOCD IN RCRD: CPT | Performed by: INTERNAL MEDICINE

## 2022-05-03 PROCEDURE — 1125F AMNT PAIN NOTED PAIN PRSNT: CPT | Performed by: INTERNAL MEDICINE

## 2022-05-03 PROCEDURE — G0439 PPPS, SUBSEQ VISIT: HCPCS | Performed by: INTERNAL MEDICINE

## 2022-05-03 PROCEDURE — 1170F FXNL STATUS ASSESSED: CPT | Performed by: INTERNAL MEDICINE

## 2022-05-03 PROCEDURE — 96160 PT-FOCUSED HLTH RISK ASSMT: CPT | Performed by: INTERNAL MEDICINE

## 2022-05-03 PROCEDURE — 99397 PER PM REEVAL EST PAT 65+ YR: CPT | Performed by: INTERNAL MEDICINE

## 2022-05-03 RX ORDER — PANTOPRAZOLE SODIUM 40 MG/1
TABLET, DELAYED RELEASE ORAL
COMMUNITY
End: 2022-05-03

## 2022-05-03 RX ORDER — HYDROCODONE BITARTRATE AND ACETAMINOPHEN 5; 325 MG/1; MG/1
1 TABLET ORAL EVERY 6 HOURS PRN
COMMUNITY
End: 2023-02-02 | Stop reason: SDUPTHER

## 2022-05-03 NOTE — PROGRESS NOTES
"QUICK REFERENCE INFORMATION:  The ABCs of the Annual Wellness Visit    Subsequent Medicare Wellness Visit    Chief Complaint   Patient presents with   • Medicare Wellness-subsequent        Subjective   History of Present Illness    Mya RED is a 71 y.o. female who presents for a Subsequent Wellness Visit. In addition, we addressed the following health issues:    Patient shares that overall, she is feeling very well.  He did recently get  and seems to be very happy.  He continues to smoke but states that she has cut down from 1 pack/day to now 3/4 pack/day.  She does continue to have chronic left knee pain but continues to follow with pain management.  Her blood pressure remains well controlled with lisinopril.  She remains functional and able to clean her own home and manage ADLs fairly well.      HEALTH RISK ASSESSMENT    1950    Recent Hospitalizations:  No hospitalization(s) within the last year..        Current Medical Providers:  Patient Care Team:  Garland Rodriguez DO as PCP - General (Internal Medicine)  Harris Lewis MD as Consulting Physician (General Surgery)  Sherin Wilkinson APRN (Orthopedic Surgery)        Smoking Status:  Social History     Tobacco Use   Smoking Status Current Every Day Smoker   • Packs/day: 0.50   • Years: 50.00   • Pack years: 25.00   • Types: Cigarettes   Smokeless Tobacco Never Used   Tobacco Comment    REPORTS SHE SMOKES \"OFF AND ON SINCE AGE 18\"       Alcohol Consumption:  Social History     Substance and Sexual Activity   Alcohol Use No    Comment: SOCIAL USE, REPORTS NO HX OF ETOH ABUSE. QUIT 12/2018       Depression Screen:   PHQ-2/PHQ-9 Depression Screening 5/3/2022   Retired Total Score -   Little Interest or Pleasure in Doing Things 0-->not at all   Feeling Down, Depressed or Hopeless 0-->not at all   PHQ-9: Brief Depression Severity Measure Score 0       Health Habits and Functional and Cognitive Screening:  Functional & Cognitive Status " 5/3/2022   Do you have difficulty preparing food and eating? No   Do you have difficulty bathing yourself, getting dressed or grooming yourself? No   Do you have difficulty using the toilet? No   Do you have difficulty moving around from place to place? No   Do you have trouble with steps or getting out of a bed or a chair? No   Current Diet Well Balanced Diet   Dental Exam Not up to date        Dental Exam Comment -   Eye Exam Up to date        Eye Exam Comment -   Exercise (times per week) -   Current Exercises Include Walking   Current Exercise Activities Include -   Do you need help using the phone?  No   Are you deaf or do you have serious difficulty hearing?  No   Do you need help with transportation? Yes   Do you need help shopping? No   Do you need help preparing meals?  No   Do you need help with housework?  No   Do you need help with laundry? No   Do you need help taking your medications? No   Do you need help managing money? No   Do you ever drive or ride in a car without wearing a seat belt? No   Have you felt unusual stress, anger or loneliness in the last month? No   Who do you live with? Spouse   If you need help, do you have trouble finding someone available to you? No   Have you been bothered in the last four weeks by sexual problems? No   Do you have difficulty concentrating, remembering or making decisions? No           Does the patient have evidence of cognitive impairment? No    Aspirin use counseling: Taking ASA appropriately as indicated      Recent Lab Results:  CMP:  Lab Results   Component Value Date    BUN 10 04/30/2021    CREATININE 0.88 04/30/2021    EGFRIFNONA 64 04/30/2021    EGFRIFAFRI 77 04/30/2021    BCR 11.4 04/30/2021     04/30/2021    K 3.4 (L) 04/30/2021    CO2 26.1 04/30/2021    CALCIUM 9.7 04/30/2021    PROTENTOTREF 7.6 04/30/2021    ALBUMIN 4.20 04/30/2021    LABGLOBREF 3.4 04/30/2021    LABIL2 1.2 04/30/2021    BILITOT 0.4 04/30/2021    ALKPHOS 92 04/30/2021    AST 13  04/30/2021    ALT 8 04/30/2021     Lipid Panel:  Lab Results   Component Value Date    TRIG 69 04/30/2021    HDL 69 (H) 04/30/2021    VLDL 13 04/30/2021     HbA1c:       Visual Acuity:  No exam data present    Age-appropriate Screening Schedule:  Refer to the list below for future screening recommendations based on patient's age, sex and/or medical conditions. Orders for these recommended tests are listed in the plan section. The patient has been provided with a written plan.    Health Maintenance   Topic Date Due   • ZOSTER VACCINE (1 of 2) Never done   • LIPID PANEL  04/30/2022   • INFLUENZA VACCINE  08/01/2022   • DXA SCAN  05/07/2023   • TDAP/TD VACCINES (4 - Td or Tdap) 01/15/2029          The following portions of the patient's history were reviewed and updated as appropriate: allergies, current medications, past family history, past medical history, past social history, past surgical history and problem list.    Outpatient Medications Prior to Visit   Medication Sig Dispense Refill   • aspirin 81 MG EC tablet Take 1 tablet by mouth Daily. 30 tablet 5   • cetirizine (zyrTEC) 10 MG tablet Take 1 tablet by mouth Daily As Needed for Allergies. 90 tablet 1   • HYDROcodone-acetaminophen (NORCO) 5-325 MG per tablet Take 1 tablet by mouth Every 6 (Six) Hours As Needed.     • lisinopril (PRINIVIL,ZESTRIL) 20 MG tablet Take 1 tablet by mouth Daily. 90 tablet 3   • HYDROcodone-acetaminophen (NORCO) 5-325 MG per tablet Take 1 tablet by mouth Every 6 (Six) Hours As Needed.     • Biotin 10 MG capsule Take 10 mg by mouth Daily.     • pantoprazole (PROTONIX) 40 MG EC tablet pantoprazole 40 mg tablet,delayed release   TAKE 1 TABLET BY MOUTH DAILY.       No facility-administered medications prior to visit.       Patient Active Problem List   Diagnosis   • Essential hypertension   • Chronic neck pain   • Allergic rhinitis due to allergen   • Diarrhea   • Colon cancer screening   • Chronic pain of left knee   • Possible exposure  to STD   • Vaginal burning   • Personal history of colonic polyps       Advance Care Planning:  ACP discussion was held with the patient during this visit. Patient has an advance directive (not in EMR), copy requested.    Identification of Risk Factors:  Risk factors include: Advance Directive Discussion.    Review of Systems   Constitutional: Negative for chills, fatigue and fever.   HENT: Negative for congestion, ear pain, rhinorrhea, sinus pressure and sore throat.    Eyes: Negative for visual disturbance.   Respiratory: Negative for cough, chest tightness, shortness of breath and wheezing.    Cardiovascular: Negative for chest pain, palpitations and leg swelling.   Gastrointestinal: Negative for abdominal pain, blood in stool, constipation, diarrhea, nausea and vomiting.   Endocrine: Negative for polydipsia and polyuria.   Genitourinary: Negative for dysuria and hematuria.   Musculoskeletal: Negative for arthralgias and back pain.   Skin: Negative for rash.   Neurological: Negative for dizziness, light-headedness, numbness and headaches.   Psychiatric/Behavioral: Negative for dysphoric mood and sleep disturbance. The patient is not nervous/anxious.        Compared to one year ago, the patient feels her physical health is the same.  Compared to one year ago, the patient feels her mental health is the same.    Objective     Physical Exam  Vitals and nursing note reviewed.   Constitutional:       General: She is not in acute distress.     Appearance: Normal appearance. She is well-developed.   HENT:      Head: Normocephalic and atraumatic.      Right Ear: Tympanic membrane and external ear normal.      Left Ear: Tympanic membrane and external ear normal.      Nose: Nose normal.      Mouth/Throat:      Mouth: Mucous membranes are moist.      Pharynx: No oropharyngeal exudate.   Eyes:      General: No scleral icterus.     Conjunctiva/sclera: Conjunctivae normal.      Pupils: Pupils are equal, round, and reactive to  "light.   Neck:      Thyroid: No thyromegaly.      Vascular: No JVD.   Cardiovascular:      Rate and Rhythm: Normal rate and regular rhythm.      Heart sounds: Normal heart sounds.   Pulmonary:      Effort: Pulmonary effort is normal. No respiratory distress.      Breath sounds: Normal breath sounds. No stridor. No wheezing.   Abdominal:      General: Bowel sounds are normal. There is no distension.      Palpations: Abdomen is soft. There is no mass.      Tenderness: There is no abdominal tenderness. There is no guarding.   Genitourinary:     Comments: Deferred  Musculoskeletal:         General: No tenderness. Normal range of motion.      Cervical back: Normal range of motion and neck supple.   Lymphadenopathy:      Cervical: No cervical adenopathy.   Skin:     General: Skin is warm and dry.   Neurological:      Mental Status: She is alert and oriented to person, place, and time.      Cranial Nerves: No cranial nerve deficit.   Psychiatric:         Behavior: Behavior normal.         Thought Content: Thought content normal.         Judgment: Judgment normal.         Vitals:    05/03/22 0915   BP: 121/63   Pulse: 52   Resp: 16   Temp: 97.7 °F (36.5 °C)   SpO2: 100%   Weight: 50.3 kg (111 lb)   Height: 154.9 cm (61\")   PainSc:   8   PainLoc: Knee  Comment: left       BMI is within normal parameters. No follow-up required.      Assessment/Plan   Patient Self-Management and Personalized Health Advice  The patient has been provided with information about: tobacco cessation and designing advance directives and preventive services including:   · Annual Wellness Visit (AWV).    Visit Diagnoses:    ICD-10-CM ICD-9-CM   1. Smoking greater than 40 pack years  F17.210 305.1   2. Gastroesophageal reflux disease without esophagitis  K21.9 530.81   3. Essential hypertension  I10 401.9   4. Encounter for preventive health examination  Z00.00 V70.0   5. Chronic hepatitis C without hepatic coma (HCC)  B18.2 070.54   6. Other " hyperlipidemia  E78.49 272.4   7. Screening for osteoporosis  Z13.820 V82.81   8. Encounter for follow-up examination after completed treatment for conditions other than malignant neoplasm   Z09 V67.9       Discussion Summary:  Patient is a 71 y.o. female who presents for a Subsequent Wellness Visit.    1. Preventive Health Maintenance  - Baseline labs ordered per above.  - Vaccines reviewed and updated  - Shingles vaccine records logged into system: [facility] Oct 2 2018, Dec 6th 2018  - Preventive health measures were discussed including: healthy diet with increase in fruits and vegetables, regular exercise at least 3 times a week, safe sex practices, avoidance of drugs, tobacco, and alcohol, and regular seatbelt use.     2.  Tobacco abuse  - Continues to smoke with a greater than 40-pack-year history.  - Patient was agreeable to CT screening for lung cancer.    3.  GERD  - Stable.  Not currently on PPI.    4.  Chronic hepatitis C  -Resolved, patient was treated a couple of years ago.    5.  Hyperlipidemia  --Continue monitoring lipids.    6.  Essential hypertension  - Well-controlled on lisinopril 20 mg daily.      No orders of the defined types were placed in this encounter.      Outpatient Encounter Medications as of 5/3/2022   Medication Sig Dispense Refill   • aspirin 81 MG EC tablet Take 1 tablet by mouth Daily. 30 tablet 5   • cetirizine (zyrTEC) 10 MG tablet Take 1 tablet by mouth Daily As Needed for Allergies. 90 tablet 1   • HYDROcodone-acetaminophen (NORCO) 5-325 MG per tablet Take 1 tablet by mouth Every 6 (Six) Hours As Needed.     • lisinopril (PRINIVIL,ZESTRIL) 20 MG tablet Take 1 tablet by mouth Daily. 90 tablet 3   • [DISCONTINUED] HYDROcodone-acetaminophen (NORCO) 5-325 MG per tablet Take 1 tablet by mouth Every 6 (Six) Hours As Needed.     • [DISCONTINUED] Biotin 10 MG capsule Take 10 mg by mouth Daily.     • [DISCONTINUED] pantoprazole (PROTONIX) 40 MG EC tablet pantoprazole 40 mg  tablet,delayed release   TAKE 1 TABLET BY MOUTH DAILY.       No facility-administered encounter medications on file as of 5/3/2022.       Reviewed use of high risk medication in the elderly: yes  Reviewed for potential of harmful drug interactions in the elderly: yes    Follow Up:  No follow-ups on file.     An After Visit Summary and PPPS with all of these plans were given to the patient.

## 2022-05-03 NOTE — PATIENT INSTRUCTIONS
Medicare Wellness  Personal Prevention Plan of Service     Date of Office Visit:    Encounter Provider:  Garland Rodriguez DO  Place of Service:  [facility]  Patient Name: Mya RED  :  1950    As part of the Medicare Wellness portion of your visit today, we are providing you with this personalized preventive plan of services (PPPS). This plan is based upon recommendations of the [place] Preventive Services Task Force (USPSTF) and the Advisory Committee on Immunization Practices (ACIP).    This lists the preventive care services that should be considered, and provides dates of when you are due. Items listed as completed are up-to-date and do not require any further intervention.    Health Maintenance   Topic Date Due    Hepatitis B (1 of 3 - Risk 3-dose series) Never done    ZOSTER VACCINE (1 of 2) Never done    LUNG CANCER SCREENING  Never done    LIPID PANEL  2022    INFLUENZA VACCINE  2022    ANNUAL WELLNESS VISIT  2023    DXA SCAN  2023    TDAP/TD VACCINES (4 - Td or Tdap) 01/15/2029    HEPATITIS C SCREENING  Completed    COVID-19 Vaccine  Completed    Pneumococcal Vaccine 65+  Completed    COLORECTAL CANCER SCREENING  Completed       Orders Placed This Encounter   Procedures    CT Chest Low Dose Wo     Standing Status:   Future     Standing Expiration Date:   5/3/2023     Order Specific Question:   The patient is age 50-80:     Answer:   71     Order Specific Question:   The patient is a current smoker?     Answer:   Yes     Order Specific Question:   The patient has a smoking history of 20 pack-years or greater:     Answer:   Yes     Order Specific Question:   Actual pack - year smoking history (number):     Answer:   50     Order Specific Question:   Does the patient have any clinical signs/symptoms of lung cancer?     Answer:   Yes     Order Specific Question:   The patient was engaged in shared decision-making for this test:      Answer:   Yes     Order Specific Question:   Release to patient     Answer:   Immediate    Comprehensive Metabolic Panel     Order Specific Question:   Release to patient     Answer:   Immediate    Lipid Panel    CBC & Differential     Order Specific Question:   Manual Differential     Answer:   No       No follow-ups on file.

## 2022-05-04 NOTE — PROGRESS NOTES
Labs are all holding stable.  There is mild elevation in creatinine suggesting slight dehydration.  I would encourage patient to drink more water.

## 2022-05-10 DIAGNOSIS — I10 ESSENTIAL HYPERTENSION: ICD-10-CM

## 2022-05-10 RX ORDER — LISINOPRIL 20 MG/1
20 TABLET ORAL DAILY
Qty: 90 TABLET | Refills: 3 | Status: SHIPPED | OUTPATIENT
Start: 2022-05-10 | End: 2023-02-02 | Stop reason: SINTOL

## 2022-05-10 NOTE — TELEPHONE ENCOUNTER
Rx Refill Note  Requested Prescriptions     Pending Prescriptions Disp Refills   • lisinopril (PRINIVIL,ZESTRIL) 20 MG tablet [Pharmacy Med Name: LISINOPRIL 20 MG TABLET 20 Tablet] 90 tablet 3     Sig: TAKE 1 TABLET BY MOUTH DAILY.      Last office visit with prescribing clinician: 5/3/2022      Next office visit with prescribing clinician: Visit date not found            Mera Simmons LPN  05/10/22, 17:34 EDT

## 2022-05-11 ENCOUNTER — APPOINTMENT (OUTPATIENT)
Dept: CT IMAGING | Facility: HOSPITAL | Age: 72
End: 2022-05-11

## 2022-05-20 ENCOUNTER — HOSPITAL ENCOUNTER (OUTPATIENT)
Dept: ULTRASOUND IMAGING | Facility: HOSPITAL | Age: 72
Discharge: HOME | End: 2022-05-20

## 2022-06-15 NOTE — PROGRESS NOTES
"     Follow Up Note     Date: 2022   Patient Name: Mya RED  MRN: [id number]  : 1950     Referring Physician: Garland Rodriguez DO    Chief Complaint:    Chief Complaint   Patient presents with   • Follow-up   • Hx Hep C       Interval History:   2022  Mya RED is a 72 y.o. female who is here today for follow up for her chronic liver disease and history of chronic hepatitis.  She is doing well without any current GI symptoms.  Is here for follow-up for possible ultrasound and to follow-up with the lab work.      2020  The patient has history of chronic hep C (genotype 1b) diagnosed about 2-3 years ago.  The patient had blood transfusion in the .  There is no history of drug use.  She denies history of tattoos.  There is history of significant alcohol use over the years.  The patient started drinking alcohol at age 18.  She used to drink about 4-5 drinks usually 4-5 times a week.  The patient claims that she \"quit alcohol in \".  However the patient switched to \"nonalcoholic beer\".  She was drinking 4 to 5 cans of nonalcoholic beer on daily basis.  She finally quit nonalcoholic beer as well in 2018.  The patient has been vaccinated against hepatitis A.  It appears that the patient had been exposed to hepatitis B in the past, got rid of it and is currently immune to it.  There is no history of jaundice.  She denies darkening of urine color, lightening of stool color or pruritus.  The patient was also found to be positive for DAE.     The patient denies further diarrhea.  Currently she is been having 1 and occasionally 2 formed stools.  She denies reflux.  There is no dysphagia or odynophagia.  There is no history of hematemesis, melena or hematochezia.  She denies recent weight loss    Subjective      Past Medical History:   Past Medical History:   Diagnosis Date   • Body piercing     EARS   • COPD (chronic obstructive pulmonary disease) (HCC)    • " "Hay fever     REPORTS \"WHEN I LIVED IN [place] IN THE EARLY SPRING AND THE EARLY FALL\"   • Hay fever    • Hepatitis C    • History of bronchitis    • History of fracture     Left wrist - no surgery was required   • History of transfusion     REPORTS SHE THINKS SHE HAS HAD A TRANSFUSION IN THE PAST BUT IS NOT CERTAIN. DID REPORTS IF SHE HAD A TRANSFUSION THAT THERE IS NO KNOWN REACTION   • Hypertension    • Seasonal allergies    • Wears glasses     RX   • Wears partial dentures     REPORTS SHE WEARS A PARTIAL IN LOWER MOUTH - INSTRUCTED NO ADHESIVES THE DOS     Past Surgical History:   Past Surgical History:   Procedure Laterality Date   • CATARACT EXTRACTION Left    • COLONOSCOPY     • COLONOSCOPY N/A 8/16/2018    Procedure: COLONOSCOPY hot snare polypectomy x2, saline injection, cold biopsies, tattoo injection, resolution clip x3;  Surgeon: Sen Morley MD;  Location: [facility] ENDOSCOPY;  Service: Gastroenterology   • COLONOSCOPY N/A 4/22/2021    Procedure: COLONOSCOPY WITH COLD SNARE POLYPECTOMY;  Surgeon: Dario Mcallister MD;  Location: [facility] ENDOSCOPY;  Service: Gastroenterology;  Laterality: N/A;   • HAND SURGERY Left     Patient reported repair left thumb (was unsure if trigger release)   • JOINT REPLACEMENT Left     KNEE REPLACEMENT, THEN LATER HAD REVISION OF LEFT KNEE   • MOUTH SURGERY      Oral extractions   • TONSILLECTOMY     • TUBAL ABDOMINAL LIGATION     • WISDOM TOOTH EXTRACTION         Family History:   Family History   Problem Relation Age of Onset   • Diabetes Mother    • Heart attack Mother    • Stroke Mother    • Diabetes Father    • Heart attack Father    • Stroke Father    • Hyperlipidemia Father    • Hypertension Father    • Colon polyps Brother    • Diabetes Brother    • Cancer Paternal Aunt         stomach   • Diabetes Sister    • Colon cancer Neg Hx        Social History:   Social History     Socioeconomic History   • Marital status:    Tobacco Use   • Smoking status: Current " "Every Day Smoker     Packs/day: 0.50     Years: 50.00     Pack years: 25.00     Types: Cigarettes   • Smokeless tobacco: Never Used   • Tobacco comment: REPORTS SHE SMOKES \"OFF AND ON SINCE AGE 18\"   Vaping Use   • Vaping Use: Never used   Substance and Sexual Activity   • Alcohol use: Not Currently     Comment: Hx of social use   • Drug use: No   • Sexual activity: Defer       Medications:     Current Outpatient Medications:   •  aspirin 81 MG EC tablet, Take 1 tablet by mouth Daily., Disp: 30 tablet, Rfl: 5  •  cetirizine (zyrTEC) 10 MG tablet, Take 1 tablet by mouth Daily As Needed for Allergies., Disp: 90 tablet, Rfl: 1  •  HYDROcodone-acetaminophen (NORCO)  MG per tablet, Take 1 tablet by mouth Every 6 (Six) Hours As Needed., Disp: , Rfl:   •  HYDROcodone-acetaminophen (NORCO) 5-325 MG per tablet, Take 1 tablet by mouth Every 6 (Six) Hours As Needed., Disp: , Rfl:   •  lisinopril (PRINIVIL,ZESTRIL) 20 MG tablet, TAKE 1 TABLET BY MOUTH DAILY., Disp: 90 tablet, Rfl: 3    Allergies:   No Known Allergies    Review of Systems:   Review of Systems   Constitutional: Negative for appetite change, fatigue, fever and unexpected weight loss.   HENT: Negative for trouble swallowing.    Gastrointestinal: Negative for abdominal distention, abdominal pain, anal bleeding, blood in stool, constipation, diarrhea, nausea, rectal pain, vomiting, GERD and indigestion.       The following portions of the patient's history were reviewed and updated as appropriate: allergies, current medications, past family history, past medical history, past social history, past surgical history and problem list.    Objective     Physical Exam:  Vital Signs:   Vitals:    06/16/22 1321   BP: 124/67   Pulse: 68   Resp: 12  Comment: talking   Temp: 97.8 °F (36.6 °C)   TempSrc: Infrared   Weight: 50.3 kg (111 lb)   Height: 156.2 cm (61.5\")       Physical Exam  Constitutional:       Appearance: Normal appearance.   HENT:      Head: Normocephalic " and atraumatic.   Eyes:      Conjunctiva/sclera: Conjunctivae normal.   Abdominal:      General: Abdomen is flat. There is no distension.      Palpations: There is no mass.      Tenderness: There is no abdominal tenderness. There is no guarding or rebound.      Hernia: No hernia is present.   Musculoskeletal:      Cervical back: Normal range of motion and neck supple.   Neurological:      Mental Status: She is alert.         Results Review:   I reviewed the patient's new clinical results.    Office Visit on 05/03/2022   Component Date Value Ref Range Status   • WBC 05/03/2022 7.89  3.40 - 10.80 10*3/mm3 Final   • RBC 05/03/2022 4.68  3.77 - 5.28 10*6/mm3 Final   • Hemoglobin 05/03/2022 14.1  12.0 - 15.9 g/dL Final   • Hematocrit 05/03/2022 42.1  34.0 - 46.6 % Final   • MCV 05/03/2022 90.0  79.0 - 97.0 fL Final   • MCH 05/03/2022 30.1  26.6 - 33.0 pg Final   • MCHC 05/03/2022 33.5  31.5 - 35.7 g/dL Final   • RDW 05/03/2022 12.4  12.3 - 15.4 % Final   • Platelets 05/03/2022 190  140 - 450 10*3/mm3 Final   • Neutrophil Rel % 05/03/2022 50.8  42.7 - 76.0 % Final   • Lymphocyte Rel % 05/03/2022 36.1  19.6 - 45.3 % Final   • Monocyte Rel % 05/03/2022 5.3  5.0 - 12.0 % Final   • Eosinophil Rel % 05/03/2022 7.2 (A) 0.3 - 6.2 % Final   • Basophil Rel % 05/03/2022 0.3  0.0 - 1.5 % Final   • Neutrophils Absolute 05/03/2022 4.01  1.70 - 7.00 10*3/mm3 Final   • Lymphocytes Absolute 05/03/2022 2.85  0.70 - 3.10 10*3/mm3 Final   • Monocytes Absolute 05/03/2022 0.42  0.10 - 0.90 10*3/mm3 Final   • Eosinophils Absolute 05/03/2022 0.57 (A) 0.00 - 0.40 10*3/mm3 Final   • Basophils Absolute 05/03/2022 0.02  0.00 - 0.20 10*3/mm3 Final   • Immature Granulocyte Rel % 05/03/2022 0.3  0.0 - 0.5 % Final   • Immature Grans Absolute 05/03/2022 0.02  0.00 - 0.05 10*3/mm3 Final   • nRBC 05/03/2022 0.0  0.0 - 0.2 /100 WBC Final   • Glucose 05/03/2022 90  65 - 99 mg/dL Final   • BUN 05/03/2022 11  8 - 23 mg/dL Final   • Creatinine 05/03/2022 1.03  (A) 0.57 - 1.00 mg/dL Final   • EGFR Result 05/03/2022 58.3 (A) >60.0 mL/min/1.73 Final    Comment: National Kidney Foundation and American Society of  Nephrology (ASN) Task Force recommended calculation based  on the Chronic Kidney Disease Epidemiology Collaboration  (CKD-EPI) equation refit without adjustment for race.  The GFR formula is only valid for adults with stable renal  function between ages 18 and 70.     • BUN/Creatinine Ratio 05/03/2022 10.7  7.0 - 25.0 Final   • Sodium 05/03/2022 140  136 - 145 mmol/L Final   • Potassium 05/03/2022 3.8  3.5 - 5.2 mmol/L Final    Comment: Slight hemolysis detected by analyzer. Results may be  affected.     • Chloride 05/03/2022 107  98 - 107 mmol/L Final   • Total CO2 05/03/2022 23.8  22.0 - 29.0 mmol/L Final   • Calcium 05/03/2022 9.2  8.6 - 10.5 mg/dL Final   • Total Protein 05/03/2022 7.0  6.0 - 8.5 g/dL Final   • Albumin 05/03/2022 3.90  3.50 - 5.20 g/dL Final   • Globulin 05/03/2022 3.1  gm/dL Final   • A/G Ratio 05/03/2022 1.3  g/dL Final   • Total Bilirubin 05/03/2022 0.4  0.0 - 1.2 mg/dL Final   • Alkaline Phosphatase 05/03/2022 75  39 - 117 U/L Final   • AST (SGOT) 05/03/2022 17  1 - 32 U/L Final   • ALT (SGPT) 05/03/2022 8  1 - 33 U/L Final   • Total Cholesterol 05/03/2022 162  0 - 200 mg/dL Final    Comment: Cholesterol Reference Ranges  (U.S. Department of Health and Human Services ATP III  Classifications)  Desirable          <200 mg/dL  Borderline High    200-239 mg/dL  High Risk          >240 mg/dL  Triglyceride Reference Ranges  (U.S. Department of Health and Human Services ATP III  Classifications)  Normal           <150 mg/dL  Borderline High  150-199 mg/dL  High             200-499 mg/dL  Very High        >500 mg/dL  HDL Reference Ranges  (U.S. Department of Health and Human Services ATP III  Classifications)  Low     <40 mg/dl (major risk factor for CHD)  High    >60 mg/dl ('negative' risk factor for CHD)  LDL Reference Ranges  (U.S. Department of  Health and Human Services ATP III  Classifications)  Optimal          <100 mg/dL  Near Optimal     100-129 mg/dL  Borderline High  130-159 mg/dL  High             160-189 mg/dL  Very High        >189 mg/dL     • Triglycerides 05/03/2022 51  0 - 150 mg/dL Final   • HDL Cholesterol 05/03/2022 55  40 - 60 mg/dL Final   • VLDL Cholesterol Steven 05/03/2022 11  5 - 40 mg/dL Final   • LDL Chol Calc ([facility]) 05/03/2022 96  0 - 100 mg/dL Final      No radiology results for the last 90 days.      DEXA Bone Density Axial     Result Date: 5/7/2021  The bone mineral densities within the lumbar spine and left femoral neck are within the range of osteopenia.  Images were reviewed, interpreted, and dictated by Dr. Garibay. Transcribed by Leyla Rojas PA-C.  This report was finalized on 5/7/2021 1:39 PM by Clari Garibay M.D..     4/22/2021  - One 4 mm polyp at the appendiceal orifice, removed with a cold snare. Resected and retrieved.  - One 3 mm polyp in the cecum, removed with a cold snare. Resected and retrieved.  - One 6 mm polyp in the proximal descending colon, removed with a cold snare. Resected and retrieved.  - A few 4 to 6 mm polyps in the rectum and at the recto-sigmoid colon, two removed with a cold snare. Resected  and retrieved. clinically hyperplastic  - Diverticulosis in the sigmoid colon.     Laboratory Testing:  Upon review of medical records:     Dated July 13, 2018 sodium 140 potassium 4.2 chloride 105 CO2 27 BUN 11 serum creatinine 0.80 glucose 88.  Calcium 9.8.  Total protein 8.1.  Albumin 4.10.  T bili 0.8 AST 41 ALT 33 alkaline phosphatase 75.  Total cholesterol 142.  Triglycerides 67.  Hepatitis A IgM negative.  Hepatitis B surface antigen negative.  Hepatitis B core IgM negative.  Hepatitis C virus antibody positive at >11.0.     Dated July 18, 2018 HCV by PCR quantitative 2,700,000 IU/mL.  6.431 log 10 IU/mL.   Dated September 27, 2018 sodium 142 potassium 4.1 chloride 107 CO2 28 BUN 11 serum  creatinine 0.80 glucose 89.  Calcium 9.9.  Total protein 9.0.  Albumin 4.70.  T bili 0.4 AST 41 ALT 48 alkaline phosphatase 102.  Serum iron 87.  TIBC 375.  Iron saturation 23%.  Ferritin 339.00.  Alpha-1 antitrypsin level 185.  Phenotype MM.  DAE direct positive.  Smooth muscle antibody negative at 16.  Mitochondrial antibody negative at <20.0.  Hepatitis A IgM negative.  Hepatitis A total antibody negative.  Hepatitis B core IgM negative.  Hepatitis B core total antibody positive.  Hepatitis B surface antibody reactive.  Hepatitis B surface antigen negative.     Dated January 31, 2019 HCV Fibrosure: Fibrosis score 0.64 (elevated).  Fibrosis stage F3-bridging fibrosis with many septa.  Necroinflammatory activity score elevated at 0.23, activity grade A0 to A1.  Alpha-2-macroglobulin quantitative elevated at 344.  Haptoglobin 41.  GGT elevated at 85.Apolipoprotein A-1 level at 197.     Dated April 2, 2019 TSH 0.485.  Vitamin B12 level 375.    Dated April 15, 2019 WBC 6.69 hemoglobin 15.4 hematocrit 45.9 MCV 88.3 platelet count 211.  PT 13.3.  INR 0.98.  PTT 33.2.     Dated April 30, 2019 sodium 135 potassium 4.0 chloride 106 CO2 27 BUN 10 serum creatinine 0.90 glucose 91.  Calcium 9.6.  Total protein 8.1.  Albumin 4.10.  T bili 0.5 AST 41 ALT 31 alkaline phosphatase 87.  HCVRNA by PCR quantitative 132, 000 IU/mL.  5.121 log 10 IU/mL.  Genotype 1b.     Dated June 11, 2019 sodium 141 potassium 4.1 chloride 105 CO2 30 BUN 12 serum creatinine 0.90 glucose 97.  Calcium 9.5.  Total protein 8.4.  Albumin 4.20.  T bili 0.4 AST 22 ALT 15 alkaline phosphatase 79.  WBC 7.39 hemoglobin 14.5 hematocrit 43.4 MCV 88.4 and platelet count 176.     Abdominal Imaging:  Upon review of medical records:     Dated April 15, 2019 the patient underwent a CT-guided liver biopsy which revealed: Mild chronic hepatitis (grade 1/4, stage I to early 2/4).  Negative for malignancy.  Sections in the current K showed chronic hepatitis that is fairly  mild.  The portal areas are expanded by chronic inflammatory infiltrate, but interface hepatitis, piecemeal necrosis, and overt hepatocyte destruction are not negligible.  There is mild fibrosis (early oneal-portal/portal-portal septae) in one area, but discrete nodular architectural distortion and definite sclerosis are not identified in the specimen.  This is further evidenced by the trichrome stain, which shows only expanded portal areas and one single portal area with early portal septae.  Iron is present within the iron stain (overall mild).     Procedures:  Upon review of medical records:     Dated August 16, 2018 the patient underwent a colonoscopy to the terminal ileum which revealed: Scant diverticulosis.  Colon polyps.  2 were removed 10-12 mm in size.  Internal hemorrhoids.  No endoscopic evidence of colitis was seen.  Random biopsies were obtained from the colon upon withdrawal of scope. Transverse colon polyp, hot snare, biopsy revealed tubular adenoma.  Negative for high-grade dysplasia or malignancy.  Ascending colon, proximal polypoid area and cecal junction, biopsy revealed benign colonic mucosa with an intramucosal lymphoid aggregate.  Negative for dysplasia or malignancy.  Hepatic flexure polyp, hot snare, biopsy revealed fragments of tubular adenoma.  Negative for high-grade dysplasia or malignancy. Random colon, biopsies revealed benign colonic mucosa with no diagnostic abdomen abnormalities.  Negative for chronic or active colitis including lymphocytic or collagen is colitis.    Negative for dysplasia or malignancy.  Assessment / Plan      1.  History of chronic hep C infection  Genotype 1b, F3 fibrosis/status post treatment 2021 with SVR 12   2.  Precirrhotic with F3 fibrosis  6/16/2022  Patient is doing well without any current abdominal issues.  Her recent lab work including CBC CMP done in May 2022 reviewed and reveals normal liver enzymes normal hemoglobin within normal platelets.  Given  her significant liver fibrosis we will get ultrasound abdomen to assess the progression of liver disease.  If stable and ultrasound normal this time, will discontinue further ultrasound surveillance.    6/20/2021  She finished her hep C treatment in August 2020 , She was treated with the Mavyret at 8-week course.  Hep C viral load done in 3/1/2021 is undetectable.  She achieved a sustained virological response with clearance of hep C virus. Her hep C FibroSure revealed F3 fibrosis .  Due to her advanced fibrosis we will keep her on HCC surveillance with ultrasound at least once in a year from now.  Ultrasound abdomen in 1-2 year time     11/25/2020  Denies any new symptoms.  Her end up treatment hep C viral load was undetected.  She needs a hep C viral load 3 months after treatment now and 6 months after treatment to confirm the eradication of the hep C infection. Her hep C fibrosure showed F3 fibrosis however her liver biopsy did not reveal any significant fibrosis.  She had a mild steatohepatitis with minimal fibrosis without any evidence of cirrhosis.   Follow-up in 3 months time     Prior history  3.  Adenomatous colon polyp  6/17/2021   she had a colonoscopy done on 04/22/2029 with multiple polyps removed.  4 mm polyp in the cecum was tubular adenoma without any dysplasia.  1 more polyp in the cecum was sessile serrated adenoma without dysplasia. Descending colon polyp and rectal polyps were hyperplastic and lymphoid aggregate.    She needs a repeat surveillance colonoscopy in 2026 in 5 years time.         Follow Up:   No follow-ups on file.    Dario Mcallister MD  [facility] [place]  6/16/2022  13:22 EDT     Please note that portions of this note may have been completed with a voice recognition program.

## 2022-06-16 ENCOUNTER — OFFICE VISIT (OUTPATIENT)
Dept: GASTROENTEROLOGY | Facility: CLINIC | Age: 72
End: 2022-06-16

## 2022-06-16 VITALS
HEIGHT: 62 IN | RESPIRATION RATE: 12 BRPM | SYSTOLIC BLOOD PRESSURE: 124 MMHG | DIASTOLIC BLOOD PRESSURE: 67 MMHG | TEMPERATURE: 97.8 F | HEART RATE: 68 BPM | WEIGHT: 111 LBS | BODY MASS INDEX: 20.43 KG/M2

## 2022-06-16 DIAGNOSIS — K76.9 CHRONIC LIVER DISEASE: ICD-10-CM

## 2022-06-16 DIAGNOSIS — Z86.19 HISTORY OF HEPATITIS C: Primary | ICD-10-CM

## 2022-06-16 PROCEDURE — 99213 OFFICE O/P EST LOW 20 MIN: CPT | Performed by: INTERNAL MEDICINE

## 2022-06-16 RX ORDER — HYDROCODONE BITARTRATE AND ACETAMINOPHEN 10; 325 MG/1; MG/1
1 TABLET ORAL EVERY 6 HOURS PRN
COMMUNITY
Start: 2022-06-03

## 2022-06-24 ENCOUNTER — HOSPITAL ENCOUNTER (OUTPATIENT)
Dept: ULTRASOUND IMAGING | Facility: HOSPITAL | Age: 72
Discharge: HOME | End: 2022-06-24
Admitting: INTERNAL MEDICINE

## 2022-06-24 DIAGNOSIS — Z86.19 HISTORY OF HEPATITIS C: ICD-10-CM

## 2022-06-24 DIAGNOSIS — K76.9 CHRONIC LIVER DISEASE: ICD-10-CM

## 2022-06-24 PROCEDURE — 76700 US EXAM ABDOM COMPLETE: CPT

## 2022-07-11 ENCOUNTER — TELEPHONE (OUTPATIENT)
Dept: GASTROENTEROLOGY | Facility: CLINIC | Age: 72
End: 2022-07-11

## 2022-07-11 NOTE — TELEPHONE ENCOUNTER
She has a small right kidney cyst (benign). Her pancreas was partially obscured and not able to be fully examined on imaging (likely due to gas or other organs, this is not worrisome.)

## 2022-07-11 NOTE — TELEPHONE ENCOUNTER
Pt called asking for the results of her US Abd complete, completed on 6/24. Pt was asking about the cyst that was noted and her pancreas partially obsecured.  Please advise, thank you.

## 2022-07-11 NOTE — TELEPHONE ENCOUNTER
Notified pt that she has a small right kidney cyst that was benign. I let her know that her pancreas was partially obscured and not able to be fully examined on imaging, likely due to gas or other organs, and that this is not worrisome as well. Pt verbalized understanding.

## 2022-08-10 ENCOUNTER — TELEPHONE (OUTPATIENT)
Dept: INTERNAL MEDICINE | Facility: CLINIC | Age: 72
End: 2022-08-10

## 2022-08-10 NOTE — TELEPHONE ENCOUNTER
Caller: Jonah RED    Relationship: Self    Best call back number: [phone]    What is the best time to reach you: YES    Who are you requesting to speak with (clinical staff, provider,  specific staff member): DR. MATOS    Do you know the name of the person who called: JONAH    What was the call regarding: PATIENT WANTED TO GO TO [facility] TO GET A WHOOPING COUGH VACCINE. SHE WOULD LIKE TO DISCUSS THAT WITH SOMEONE BEFORE GOING THERE.    Do you require a callback: YES

## 2023-01-27 ENCOUNTER — TELEPHONE (OUTPATIENT)
Dept: INTERNAL MEDICINE | Facility: CLINIC | Age: 73
End: 2023-01-27
Payer: MEDICARE

## 2023-02-02 ENCOUNTER — OFFICE VISIT (OUTPATIENT)
Dept: INTERNAL MEDICINE | Facility: CLINIC | Age: 73
End: 2023-02-02
Payer: MEDICARE

## 2023-02-02 VITALS
OXYGEN SATURATION: 98 % | WEIGHT: 111.8 LBS | HEIGHT: 62 IN | DIASTOLIC BLOOD PRESSURE: 78 MMHG | TEMPERATURE: 97 F | SYSTOLIC BLOOD PRESSURE: 148 MMHG | BODY MASS INDEX: 20.57 KG/M2 | HEART RATE: 54 BPM

## 2023-02-02 DIAGNOSIS — Z12.31 ENCOUNTER FOR SCREENING MAMMOGRAM FOR MALIGNANT NEOPLASM OF BREAST: ICD-10-CM

## 2023-02-02 DIAGNOSIS — I10 ESSENTIAL HYPERTENSION: Primary | ICD-10-CM

## 2023-02-02 PROCEDURE — 99213 OFFICE O/P EST LOW 20 MIN: CPT | Performed by: NURSE PRACTITIONER

## 2023-02-02 NOTE — PROGRESS NOTES
"  Office Visit      Patient Name: Mya RED  : 1950   MRN: [id number]   Care Team: Patient Care Team:  Garland Rodriguez DO as PCP - General (Internal Medicine)  Harris Lewis MD as Consulting Physician (General Surgery)  Sherin Wilkinson APRN (Orthopedic Surgery)    Chief Complaint  Abstract (Discuss mammogram/No symptoms or concerns)    Subjective     Subjective      Mya RED presents to [facility] for mammogram order.   Blood pressure is elevated today, she is having some family stressors she believes is contributing. She was previously on lisinopril but stopped due to side effect of hair loss. She monitors her blood pressure at home sporadically, usually 120/70. Denies chest pain, shortness of breath, lower extremity swelling, dizziness, fatigue, and headache. She denies high sodium diet and tries to stay active with walking, limited with exercise due to chronic pain.   She would like an order for mammogram, last completed May 2019.    Objective     Objective   Vital Signs:   /78   Pulse 54   Temp 97 °F (36.1 °C)   Ht 156.2 cm (61.5\")   Wt 50.7 kg (111 lb 12.8 oz)   SpO2 98%   BMI 20.78 kg/m²     Physical Exam  Vitals and nursing note reviewed.   Constitutional:       General: She is not in acute distress.     Appearance: Normal appearance. She is not ill-appearing or toxic-appearing.   Eyes:      Pupils: Pupils are equal, round, and reactive to light.   Neck:      Vascular: No carotid bruit.   Cardiovascular:      Rate and Rhythm: Regular rhythm. Bradycardia present.      Heart sounds: Normal heart sounds. No murmur heard.  Pulmonary:      Effort: Pulmonary effort is normal. No respiratory distress.      Breath sounds: Normal breath sounds. No wheezing.   Abdominal:      General: Bowel sounds are normal. There is no distension.      Palpations: Abdomen is soft.      Tenderness: There is no abdominal tenderness.   Musculoskeletal: "      Cervical back: Neck supple. No tenderness.   Skin:     General: Skin is warm and dry.      Findings: No rash.   Neurological:      General: No focal deficit present.      Mental Status: She is alert.   Psychiatric:         Mood and Affect: Mood normal.         Behavior: Behavior normal.          Assessment / Plan      Assessment & Plan   Problem List Items Addressed This Visit        Cardiac and Vasculature    Essential hypertension - Primary    On recheck did improve. Recommend monitoring at home, DASH diet, exercise as tolerated, and stress management. Goal <130/80, if staying elevated at home will follow-up sooner to discuss further antihypertensive therapy. Follow-up for MWV in May.    Other Visit Diagnoses     Encounter for screening mammogram for malignant neoplasm of breast        Relevant Orders    Mammo Screening Digital Tomosynthesis Bilateral With CAD    Order placed.          Follow Up   Return in about 3 months (around 5/10/2023) for Medicare Wellness.  Patient was given instructions and counseling regarding her condition or for health maintenance advice. Please see specific information pulled into the AVS if appropriate.     BEATRICE Henson  [facility] - [place]

## 2023-02-06 ENCOUNTER — TELEPHONE (OUTPATIENT)
Dept: INTERNAL MEDICINE | Facility: CLINIC | Age: 73
End: 2023-02-06
Payer: MEDICARE

## 2023-02-06 NOTE — TELEPHONE ENCOUNTER
Caller: Mya RED    Relationship to patient: Self    Best call back number: [phone]    What is the call regarding:  PATIENT IS CALLING TO SEE IF DR MATOS COULD PRESCRIBE THE EQUATE UPPER ARM BLOOD PRESSURE MONITOR 400 SERIES.  PLEASE ADVISE.

## 2023-02-07 DIAGNOSIS — I10 ESSENTIAL HYPERTENSION: Primary | ICD-10-CM

## 2023-04-24 ENCOUNTER — HOSPITAL ENCOUNTER (OUTPATIENT)
Dept: MAMMOGRAPHY | Facility: HOSPITAL | Age: 73
Discharge: HOME | End: 2023-04-24
Admitting: NURSE PRACTITIONER
Payer: MEDICARE

## 2023-04-24 PROCEDURE — 77067 SCR MAMMO BI INCL CAD: CPT

## 2023-04-24 PROCEDURE — 77063 BREAST TOMOSYNTHESIS BI: CPT

## 2023-05-09 ENCOUNTER — TELEPHONE (OUTPATIENT)
Dept: INTERNAL MEDICINE | Facility: CLINIC | Age: 73
End: 2023-05-09

## 2023-05-09 NOTE — TELEPHONE ENCOUNTER
Patient did not complete below ordered on below. This order is too old to be used and has been cancelled.        Pt called asking for effexor 37 5 mg  Pt dose not get this from us  Called pt and left msg asking pt to call back

## 2023-05-26 ENCOUNTER — OFFICE VISIT (OUTPATIENT)
Dept: INTERNAL MEDICINE | Facility: CLINIC | Age: 73
End: 2023-05-26

## 2023-05-26 VITALS
WEIGHT: 112 LBS | OXYGEN SATURATION: 100 % | HEIGHT: 62 IN | HEART RATE: 58 BPM | RESPIRATION RATE: 16 BRPM | SYSTOLIC BLOOD PRESSURE: 142 MMHG | TEMPERATURE: 97.7 F | BODY MASS INDEX: 20.61 KG/M2 | DIASTOLIC BLOOD PRESSURE: 70 MMHG

## 2023-05-26 DIAGNOSIS — M85.852 OSTEOPENIA OF LEFT HIP: ICD-10-CM

## 2023-05-26 DIAGNOSIS — K21.9 GASTROESOPHAGEAL REFLUX DISEASE WITHOUT ESOPHAGITIS: ICD-10-CM

## 2023-05-26 DIAGNOSIS — Z00.00 ENCOUNTER FOR PREVENTIVE HEALTH EXAMINATION: Primary | ICD-10-CM

## 2023-05-26 DIAGNOSIS — F17.210 NICOTINE DEPENDENCE, CIGARETTES, UNCOMPLICATED: ICD-10-CM

## 2023-05-26 DIAGNOSIS — I10 ESSENTIAL HYPERTENSION: ICD-10-CM

## 2023-05-26 DIAGNOSIS — E78.49 OTHER HYPERLIPIDEMIA: ICD-10-CM

## 2023-05-26 DIAGNOSIS — Z12.2 SCREENING FOR LUNG CANCER: ICD-10-CM

## 2023-05-26 PROCEDURE — 3077F SYST BP >= 140 MM HG: CPT | Performed by: INTERNAL MEDICINE

## 2023-05-26 PROCEDURE — 1160F RVW MEDS BY RX/DR IN RCRD: CPT | Performed by: INTERNAL MEDICINE

## 2023-05-26 PROCEDURE — 1159F MED LIST DOCD IN RCRD: CPT | Performed by: INTERNAL MEDICINE

## 2023-05-26 PROCEDURE — 3078F DIAST BP <80 MM HG: CPT | Performed by: INTERNAL MEDICINE

## 2023-05-26 PROCEDURE — G0439 PPPS, SUBSEQ VISIT: HCPCS | Performed by: INTERNAL MEDICINE

## 2023-05-26 PROCEDURE — 99397 PER PM REEVAL EST PAT 65+ YR: CPT | Performed by: INTERNAL MEDICINE

## 2023-05-26 NOTE — PATIENT INSTRUCTIONS
Medicare Wellness  Personal Prevention Plan of Service     Date of Office Visit:    Encounter Provider:  Garland Rodriguez DO  Place of Service:  [facility]  Patient Name: Mya RED  :  1950    As part of the Medicare Wellness portion of your visit today, we are providing you with this personalized preventive plan of services (PPPS). This plan is based upon recommendations of the [place] Preventive Services Task Force (USPSTF) and the Advisory Committee on Immunization Practices (ACIP).    This lists the preventive care services that should be considered, and provides dates of when you are due. Items listed as completed are up-to-date and do not require any further intervention.    Health Maintenance   Topic Date Due    ZOSTER VACCINE (1 of 2) Never done    LUNG CANCER SCREENING  Never done    Hepatitis B (1 of 3 - Risk 3-dose series) Never done    COVID-19 Vaccine (6 - Booster for Moderna series) 2022    ANNUAL WELLNESS VISIT  2023    LIPID PANEL  2023    DXA SCAN  2023    INFLUENZA VACCINE  2023    MAMMOGRAM  2025    TDAP/TD VACCINES (4 - Td or Tdap) 01/15/2029    COLORECTAL CANCER SCREENING  2031    HEPATITIS C SCREENING  Completed    Pneumococcal Vaccine 65+  Completed       No orders of the defined types were placed in this encounter.      Return in about 1 year (around 2024) for Medicare Wellness.

## 2023-05-26 NOTE — PROGRESS NOTES
"QUICK REFERENCE INFORMATION:  The ABCs of the Annual Wellness Visit    Subsequent Medicare Wellness Visit    Chief Complaint   Patient presents with   • Medicare Wellness-subsequent     And preventive visit        Subjective   History of Present Illness    Mya RED is a 73 y.o. female who presents for a Subsequent Wellness Visit. In addition, we addressed the following health issues.  Patient shares that she continues to do well and has no new complaints or concerns.  She continues to feel \"blessed\".  Had shingles and COVID vaccines at [facility]. 2018    HEALTH RISK ASSESSMENT    1950    Recent Hospitalizations:  No hospitalization(s) within the last year..        Current Medical Providers:  Patient Care Team:  Garland Rodriguez DO as PCP - General (Internal Medicine)  Harris Lewis MD as Consulting Physician (General Surgery)  Sherin Wilkinson APRN (Orthopedic Surgery)        Smoking Status:  Social History     Tobacco Use   Smoking Status Every Day   • Packs/day: 0.50   • Years: 50.00   • Pack years: 25.00   • Types: Cigarettes   Smokeless Tobacco Never   Tobacco Comments    REPORTS SHE SMOKES \"OFF AND ON SINCE AGE 18\"       Alcohol Consumption:  Social History     Substance and Sexual Activity   Alcohol Use Not Currently    Comment: Hx of social use       Depression Screen:       2023     4:44 PM   PHQ-2/PHQ-9 Depression Screening   Little Interest or Pleasure in Doing Things 0-->not at all   Feeling Down, Depressed or Hopeless 0-->not at all   PHQ-9: Brief Depression Severity Measure Score 0       Health Habits and Functional and Cognitive Screenin/26/2023     4:42 PM   Functional & Cognitive Status   Do you have difficulty preparing food and eating? No   Do you have difficulty bathing yourself, getting dressed or grooming yourself? No   Do you have difficulty using the toilet? No   Do you have difficulty moving around from place to place? No   Do you have trouble with " steps or getting out of a bed or a chair? Yes   Current Diet Well Balanced Diet   Dental Exam Not up to date   Eye Exam Up to date   Current Exercises Include Walking   Do you need help using the phone?  No   Are you deaf or do you have serious difficulty hearing?  No   Do you need help with transportation? No   Do you need help shopping? No   Do you need help preparing meals?  No   Do you need help with housework?  No   Do you need help with laundry? No   Do you need help taking your medications? No   Do you need help managing money? No   Do you ever drive or ride in a car without wearing a seat belt? No   Have you felt unusual stress, anger or loneliness in the last month? No   Who do you live with? Spouse   If you need help, do you have trouble finding someone available to you? No   Have you been bothered in the last four weeks by sexual problems? No   Do you have difficulty concentrating, remembering or making decisions? No           Does the patient have evidence of cognitive impairment? Yes    Aspirin use counseling: Does not need ASA (and currently is not on it)      Recent Lab Results:  CMP:  Lab Results   Component Value Date    BUN 11 05/03/2022    CREATININE 1.03 (H) 05/03/2022    EGFRIFNONA 64 04/30/2021    EGFRIFAFRI 77 04/30/2021    BCR 10.7 05/03/2022     05/03/2022    K 3.8 05/03/2022    CO2 23.8 05/03/2022    CALCIUM 9.2 05/03/2022    PROTENTOTREF 7.0 05/03/2022    ALBUMIN 3.90 05/03/2022    LABGLOBREF 3.1 05/03/2022    LABIL2 1.3 05/03/2022    BILITOT 0.4 05/03/2022    ALKPHOS 75 05/03/2022    AST 17 05/03/2022    ALT 8 05/03/2022     Lipid Panel:  Lab Results   Component Value Date    TRIG 51 05/03/2022    HDL 55 05/03/2022    VLDL 11 05/03/2022     HbA1c:       Visual Acuity:  No results found.    Age-appropriate Screening Schedule:  Refer to the list below for future screening recommendations based on patient's age, sex and/or medical conditions. Orders for these recommended tests are  listed in the plan section. The patient has been provided with a written plan.    Health Maintenance   Topic Date Due   • LUNG CANCER SCREENING  Never done   • Hepatitis B (1 of 3 - Risk 3-dose series) Never done   • LIPID PANEL  05/03/2023   • DXA SCAN  05/07/2023   • INFLUENZA VACCINE  08/01/2023   • ANNUAL WELLNESS VISIT  05/26/2024   • MAMMOGRAM  04/24/2025   • TDAP/TD VACCINES (4 - Td or Tdap) 01/15/2029   • COLORECTAL CANCER SCREENING  04/22/2031   • HEPATITIS C SCREENING  Completed   • COVID-19 Vaccine  Completed   • Pneumococcal Vaccine 65+  Completed   • ZOSTER VACCINE  Completed          The following portions of the patient's history were reviewed and updated as appropriate: allergies, current medications, past family history, past medical history, past social history, past surgical history and problem list.    Outpatient Medications Prior to Visit   Medication Sig Dispense Refill   • HYDROcodone-acetaminophen (NORCO)  MG per tablet Take 1 tablet by mouth Every 6 (Six) Hours As Needed.     • aspirin 81 MG EC tablet Take 1 tablet by mouth Daily. 30 tablet 5     No facility-administered medications prior to visit.       Patient Active Problem List   Diagnosis   • Essential hypertension   • Chronic neck pain   • Allergic rhinitis due to allergen   • Diarrhea   • Colon cancer screening   • Chronic pain of left knee   • Possible exposure to STD   • Vaginal burning   • Personal history of colonic polyps       Advance Care Planning:  ACP discussion was held with the patient during this visit. Patient has an advance directive (not in EMR), copy requested.    Identification of Risk Factors:  Risk factors include: Advance Directive Discussion  Chronic Pain .    Review of Systems   Constitutional: Negative for chills, fatigue and fever.   HENT: Negative for congestion, ear pain, rhinorrhea, sinus pressure and sore throat.    Eyes: Negative for visual disturbance.   Respiratory: Negative for cough, chest  tightness, shortness of breath and wheezing.    Cardiovascular: Negative for chest pain, palpitations and leg swelling.   Gastrointestinal: Negative for abdominal pain, blood in stool, constipation, diarrhea, nausea and vomiting.   Endocrine: Negative for polydipsia and polyuria.   Genitourinary: Negative for dysuria and hematuria.   Musculoskeletal: Negative for arthralgias and back pain.   Skin: Negative for rash.   Neurological: Negative for dizziness, light-headedness, numbness and headaches.   Psychiatric/Behavioral: Negative for dysphoric mood and sleep disturbance. The patient is not nervous/anxious.        Compared to one year ago, the patient feels her physical health is the same.  Compared to one year ago, the patient feels her mental health is the same.    Objective     Physical Exam  Vitals and nursing note reviewed.   Constitutional:       General: She is not in acute distress.     Appearance: Normal appearance. She is well-developed.   HENT:      Head: Normocephalic and atraumatic.      Right Ear: Tympanic membrane and external ear normal.      Left Ear: Tympanic membrane and external ear normal.      Nose: Nose normal.      Mouth/Throat:      Mouth: Mucous membranes are moist.      Pharynx: No oropharyngeal exudate.   Eyes:      General: No scleral icterus.     Conjunctiva/sclera: Conjunctivae normal.      Pupils: Pupils are equal, round, and reactive to light.   Neck:      Thyroid: No thyromegaly.      Vascular: No JVD.   Cardiovascular:      Rate and Rhythm: Normal rate and regular rhythm.      Heart sounds: Normal heart sounds.   Pulmonary:      Effort: Pulmonary effort is normal. No respiratory distress.      Breath sounds: Normal breath sounds. No stridor. No wheezing.   Abdominal:      General: Bowel sounds are normal. There is no distension.      Palpations: Abdomen is soft. There is no mass.      Tenderness: There is no abdominal tenderness. There is no guarding.   Genitourinary:     Comments:  "Deferred  Musculoskeletal:         General: No tenderness. Normal range of motion.      Cervical back: Normal range of motion and neck supple.      Comments: Left knee remains stiff and with limited flexion.   Lymphadenopathy:      Cervical: No cervical adenopathy.   Skin:     General: Skin is warm and dry.   Neurological:      Mental Status: She is alert and oriented to person, place, and time.      Cranial Nerves: No cranial nerve deficit.   Psychiatric:         Behavior: Behavior normal.         Thought Content: Thought content normal.         Judgment: Judgment normal.         Vitals:    05/26/23 1639   BP: 142/70   Pulse: 58   Resp: 16   Temp: 97.7 °F (36.5 °C)   SpO2: 100%   Weight: 50.8 kg (112 lb)   Height: 156.2 cm (61.5\")       BMI is within normal parameters. No other follow-up for BMI required.      Assessment & Plan   Patient Self-Management and Personalized Health Advice  The patient has been provided with information about: diet, exercise and weight management and preventive services including:   · Annual Wellness Visit (AWV).    Visit Diagnoses:    ICD-10-CM ICD-9-CM   1. Encounter for preventive health examination  Z00.00 V70.0   2. Essential hypertension  I10 401.9   3. Gastroesophageal reflux disease without esophagitis  K21.9 530.81   4. Other hyperlipidemia  E78.49 272.4   5. Osteopenia of left hip  M85.852 733.90   6. Screening for lung cancer  Z12.2 V76.0   7. Nicotine dependence, cigarettes, uncomplicated  F17.210 305.1       Discussion Summary:  Patient is a 73 y.o. female who presents for a Subsequent Wellness Visit.    1. Preventive Health Maintenance  - Baseline labs ordered per above.  - Vaccines reviewed and updated  - Preventive health measures were discussed including: healthy diet with increase in fruits and vegetables, regular exercise at least 3 times a week, safe sex practices, avoidance of drugs, tobacco, and alcohol, and regular seatbelt use.    2.  Essential hypertension  - " Blood pressure remains stable with current medication regimen.    Chronic left knee pain  - Continues to follow with pain management with good control.    4.  Left hip osteopenia  - Patient needs follow-up DEXA for monitoring.  Order placed.    5.  History of tobacco abuse.  Patient would benefit from lung cancer screening.  CT chest ordered.         Orders Placed This Encounter   Procedures   • DEXA Bone Density Axial     Order Specific Question:   Is patient taking or have taken long term Glucocorticoid (steroids)?     Answer:   No     Order Specific Question:   Does the patient have rheumatoid arthritis?     Answer:   No     Order Specific Question:   Does the patient have secondary osteoporosis?     Answer:   No     Order Specific Question:   Reason for Exam:     Answer:   screen osteoporosis     Order Specific Question:   Release to patient     Answer:   Routine Release   • CT Chest Low Dose Wo     Standing Status:   Future     Standing Expiration Date:   5/26/2024     Order Specific Question:   Release to patient     Answer:   Routine Release     Order Specific Question:   The patient is age 50-80 (Medicare coverage 50-77)     Answer:   73     Order Specific Question:   The patient is a current smoker?     Answer:   Yes     Order Specific Question:   The patient has a smoking history of 20 pack-years or greater:     Answer:   Yes     Order Specific Question:   Actual pack - year smoking history (number):     Answer:   25     Order Specific Question:   Does the patient have any clinical signs/symptoms of lung cancer?     Answer:   No     Order Specific Question:   The patient was engaged in shared decision-making for this test:     Answer:   Yes   • Comprehensive Metabolic Panel     Order Specific Question:   Release to patient     Answer:   Routine Release   • Lipid Panel   • CBC & Differential     Order Specific Question:   Manual Differential     Answer:   No       Outpatient Encounter Medications as of  5/26/2023   Medication Sig Dispense Refill   • HYDROcodone-acetaminophen (NORCO)  MG per tablet Take 1 tablet by mouth Every 6 (Six) Hours As Needed.     • [DISCONTINUED] aspirin 81 MG EC tablet Take 1 tablet by mouth Daily. 30 tablet 5     No facility-administered encounter medications on file as of 5/26/2023.       Reviewed use of high risk medication in the elderly: yes  Reviewed for potential of harmful drug interactions in the elderly: yes    Follow Up:  Return in about 1 year (around 5/26/2024) for Medicare Wellness.     An After Visit Summary and PPPS with all of these plans were given to the patient.       Patient

## 2023-06-01 LAB
ALBUMIN SERPL-MCNC: 4.2 G/DL (ref 3.5–5.2)
ALBUMIN/GLOB SERPL: 1.3 G/DL
ALP SERPL-CCNC: 76 U/L (ref 39–117)
ALT SERPL-CCNC: 13 U/L (ref 1–33)
AST SERPL-CCNC: 15 U/L (ref 1–32)
BASOPHILS # BLD AUTO: 0.01 10*3/MM3 (ref 0–0.2)
BASOPHILS NFR BLD AUTO: 0.1 % (ref 0–1.5)
BILIRUB SERPL-MCNC: 0.3 MG/DL (ref 0–1.2)
BUN SERPL-MCNC: 11 MG/DL (ref 8–23)
BUN/CREAT SERPL: 11 (ref 7–25)
CALCIUM SERPL-MCNC: 10 MG/DL (ref 8.6–10.5)
CHLORIDE SERPL-SCNC: 107 MMOL/L (ref 98–107)
CHOLEST SERPL-MCNC: 157 MG/DL (ref 0–200)
CO2 SERPL-SCNC: 26.8 MMOL/L (ref 22–29)
CREAT SERPL-MCNC: 1 MG/DL (ref 0.57–1)
EGFRCR SERPLBLD CKD-EPI 2021: 59.6 ML/MIN/1.73
EOSINOPHIL # BLD AUTO: 0.33 10*3/MM3 (ref 0–0.4)
EOSINOPHIL NFR BLD AUTO: 4 % (ref 0.3–6.2)
ERYTHROCYTE [DISTWIDTH] IN BLOOD BY AUTOMATED COUNT: 12.4 % (ref 12.3–15.4)
GLOBULIN SER CALC-MCNC: 3.2 GM/DL
GLUCOSE SERPL-MCNC: 93 MG/DL (ref 65–99)
HCT VFR BLD AUTO: 44.3 % (ref 34–46.6)
HDLC SERPL-MCNC: 48 MG/DL (ref 40–60)
HGB BLD-MCNC: 15.1 G/DL (ref 12–15.9)
IMM GRANULOCYTES # BLD AUTO: 0.02 10*3/MM3 (ref 0–0.05)
IMM GRANULOCYTES NFR BLD AUTO: 0.2 % (ref 0–0.5)
LDLC SERPL CALC-MCNC: 95 MG/DL (ref 0–100)
LYMPHOCYTES # BLD AUTO: 2.61 10*3/MM3 (ref 0.7–3.1)
LYMPHOCYTES NFR BLD AUTO: 31.3 % (ref 19.6–45.3)
MCH RBC QN AUTO: 30.2 PG (ref 26.6–33)
MCHC RBC AUTO-ENTMCNC: 34.1 G/DL (ref 31.5–35.7)
MCV RBC AUTO: 88.6 FL (ref 79–97)
MONOCYTES # BLD AUTO: 0.52 10*3/MM3 (ref 0.1–0.9)
MONOCYTES NFR BLD AUTO: 6.2 % (ref 5–12)
NEUTROPHILS # BLD AUTO: 4.85 10*3/MM3 (ref 1.7–7)
NEUTROPHILS NFR BLD AUTO: 58.2 % (ref 42.7–76)
NRBC BLD AUTO-RTO: 0 /100 WBC (ref 0–0.2)
PLATELET # BLD AUTO: 239 10*3/MM3 (ref 140–450)
POTASSIUM SERPL-SCNC: 3.5 MMOL/L (ref 3.5–5.2)
PROT SERPL-MCNC: 7.4 G/DL (ref 6–8.5)
RBC # BLD AUTO: 5 10*6/MM3 (ref 3.77–5.28)
SODIUM SERPL-SCNC: 145 MMOL/L (ref 136–145)
TRIGL SERPL-MCNC: 74 MG/DL (ref 0–150)
VLDLC SERPL CALC-MCNC: 14 MG/DL (ref 5–40)
WBC # BLD AUTO: 8.34 10*3/MM3 (ref 3.4–10.8)

## 2023-09-05 ENCOUNTER — OFFICE VISIT (OUTPATIENT)
Dept: INTERNAL MEDICINE | Facility: CLINIC | Age: 73
End: 2023-09-05
Payer: MEDICARE

## 2023-09-05 VITALS
HEART RATE: 62 BPM | SYSTOLIC BLOOD PRESSURE: 132 MMHG | OXYGEN SATURATION: 99 % | HEIGHT: 61 IN | TEMPERATURE: 98.2 F | DIASTOLIC BLOOD PRESSURE: 78 MMHG | WEIGHT: 111 LBS | BODY MASS INDEX: 20.96 KG/M2

## 2023-09-05 DIAGNOSIS — N18.1 STAGE 1 CHRONIC KIDNEY DISEASE: ICD-10-CM

## 2023-09-05 DIAGNOSIS — L65.9 HAIR LOSS: Primary | ICD-10-CM

## 2023-09-05 PROCEDURE — 1159F MED LIST DOCD IN RCRD: CPT | Performed by: NURSE PRACTITIONER

## 2023-09-05 PROCEDURE — 3075F SYST BP GE 130 - 139MM HG: CPT | Performed by: NURSE PRACTITIONER

## 2023-09-05 PROCEDURE — 1160F RVW MEDS BY RX/DR IN RCRD: CPT | Performed by: NURSE PRACTITIONER

## 2023-09-05 PROCEDURE — 3078F DIAST BP <80 MM HG: CPT | Performed by: NURSE PRACTITIONER

## 2023-09-05 PROCEDURE — 99214 OFFICE O/P EST MOD 30 MIN: CPT | Performed by: NURSE PRACTITIONER

## 2023-09-05 NOTE — PROGRESS NOTES
"     Office Visit      Patient Name: Mya RED  : 1950   MRN: [id number]     Chief Complaint:    Chief Complaint   Patient presents with    Alopecia       History of Present Illness: Mya RED is a 73 y.o. female who is here today for follow up of hair loss.  Noticed it initially after she had taken lisinopril 4 years ago.  Stopped taking lisinopril, hair loss slowed down but continues.  Continues to take folic acid supplement daily.  Used minoxidil foam for a brief time, expensive.  Feels some of her hair has grown longer since starting folic acid supplement.  Denies abnormal fatigue, weight change, temperature intolerance, skin/nail changes, bowel habit changes, palpitations, anxiety, sore throat, hoarseness.     Subjective      I have reviewed and the following portions of the patient's history were updated as appropriate: past family history, past medical history, past social history, past surgical history and problem list.      Current Outpatient Medications:     folic acid (FOLVITE) 1 MG tablet, Take 1 tablet by mouth Daily., Disp: 90 tablet, Rfl: 1    HYDROcodone-acetaminophen (NORCO)  MG per tablet, Take 1 tablet by mouth Every 6 (Six) Hours As Needed., Disp: , Rfl:     Minoxidil 5 % solution, Apply 0.5 mL topically Daily., Disp: 60 mL, Rfl: 11    No Known Allergies    Objective     Physical Exam:  Vital Signs:   Vitals:    23 0759   BP: 132/78   Pulse: 62   Temp: 98.2 °F (36.8 °C)   SpO2: 99%   Weight: 50.3 kg (111 lb)   Height: 156.2 cm (61.5\")     Body mass index is 20.64 kg/m².    Physical Exam  Constitutional:       Appearance: She is not ill-appearing.   HENT:      Head: Normocephalic.      Right Ear: External ear normal.      Left Ear: External ear normal.   Eyes:      Conjunctiva/sclera: Conjunctivae normal.      Pupils: Pupils are equal, round, and reactive to light.   Cardiovascular:      Rate and Rhythm: Normal rate and regular rhythm.      Pulses:       "     Radial pulses are 2+ on the right side and 2+ on the left side.        Dorsalis pedis pulses are 2+ on the right side and 2+ on the left side.      Heart sounds: Normal heart sounds.   Pulmonary:      Effort: Pulmonary effort is normal.      Breath sounds: Normal breath sounds.   Musculoskeletal:      Cervical back: Normal range of motion and neck supple.      Right lower leg: No edema.      Left lower leg: No edema.   Skin:     General: Skin is warm.      Capillary Refill: Capillary refill takes less than 2 seconds.      Comments: Thin hair   Neurological:      Mental Status: She is alert and oriented to person, place, and time.      Coordination: Coordination normal.      Gait: Gait normal.   Psychiatric:         Mood and Affect: Mood normal.         Behavior: Behavior normal.         Thought Content: Thought content normal.           Assessment / Plan      Assessment/Plan:   Diagnoses and all orders for this visit:    1. Hair loss (Primary)  -     Minoxidil 5 % solution; Apply 0.5 mL topically Daily.  Dispense: 60 mL; Refill: 11  -     Vitamin B12  -     Zinc  -     Ambulatory Referral to Dermatology  - Avoid rough brushing, combing and styling hair.      2. Stage 1 chronic kidney disease        -  Stay well hydrated.  Keep blood pressure and blood glucose well managed.          Follow Up:   Return for Next scheduled follow up.    Patient was given instructions and counseling regarding her condition or for health maintenance advice. Please see specific information pulled into the AVS if appropriate.       [facility] [address] [place]     Please note that portions of this note may have been completed with a voice recognition program. Efforts were made to edit dictation, but occasionally words are mistranscribed.

## 2023-09-10 LAB
VIT B12 SERPL-MCNC: 441 PG/ML (ref 211–946)
ZINC SERPL-MCNC: 86 UG/DL (ref 44–115)

## 2024-03-14 ENCOUNTER — TRANSCRIBE ORDERS (OUTPATIENT)
Dept: ADMINISTRATIVE | Facility: HOSPITAL | Age: 74
End: 2024-03-14
Payer: MEDICARE

## 2024-03-14 DIAGNOSIS — Z12.31 VISIT FOR SCREENING MAMMOGRAM: Primary | ICD-10-CM

## 2024-08-16 DIAGNOSIS — L65.9 HAIR LOSS: ICD-10-CM

## 2024-08-16 NOTE — TELEPHONE ENCOUNTER
Caller: Mya RED Morrislakhwinder    Relationship: Self    Best call back number: [phone]     Requested Prescriptions:   Requested Prescriptions     Pending Prescriptions Disp Refills    Minoxidil 5 % solution 60 mL 11     Sig: Apply 0.5 mL topically Daily.        Pharmacy where request should be sent: [address] [phone] [facility] [phone]      Last office visit with prescribing clinician: 5/26/2023   Last telemedicine visit with prescribing clinician: Visit date not found   Next office visit with prescribing clinician: Visit date not found     Additional details provided by patient: PATIENT OUT OF MEDICATION    Does the patient have less than a 3 day supply:  [x] Yes  [] No    Would you like a call back once the refill request has been completed: [] Yes [x] No    If the office needs to give you a call back, can they leave a voicemail: [] Yes [x] No    Vicki Smart   08/16/24 08:38 EDT

## 2024-08-23 ENCOUNTER — PATIENT OUTREACH (OUTPATIENT)
Dept: ONCOLOGY | Facility: HOSPITAL | Age: 74
End: 2024-08-23
Payer: MEDICARE

## 2024-08-23 ENCOUNTER — TELEPHONE (OUTPATIENT)
Dept: INTERNAL MEDICINE | Facility: CLINIC | Age: 74
End: 2024-08-23
Payer: MEDICARE

## 2024-08-23 NOTE — TELEPHONE ENCOUNTER
Caller: Mya RED    Relationship: Self    Best call back number: [phone]     What orders are you requesting (i.e. lab or imaging): LUNG CANCER SCREENING    Where will you receive your lab/imaging services: HAS [facility] MEDICARE HMO SNP    Additional notes: RECEIVED A LETTER TELLING HER TO SCHEDULE THE LUNG CANCER SCREENING.

## 2024-08-23 NOTE — SIGNIFICANT NOTE
Returned patient's call regarding screening. Patient will contact physician's office for an order to screen.

## 2024-08-26 NOTE — TELEPHONE ENCOUNTER
Patient called back she is scheduled with Dr. Rodriguez on Wednesday @ 1:15 pm due to insurance.[phone]

## 2024-08-29 ENCOUNTER — TRANSCRIBE ORDERS (OUTPATIENT)
Dept: LAB | Facility: HOSPITAL | Age: 74
End: 2024-08-29
Payer: MEDICARE

## 2024-08-29 ENCOUNTER — LAB (OUTPATIENT)
Dept: LAB | Facility: HOSPITAL | Age: 74
End: 2024-08-29
Payer: MEDICARE

## 2024-08-29 DIAGNOSIS — R53.83 TIREDNESS: Primary | ICD-10-CM

## 2024-08-29 DIAGNOSIS — J44.9 VANISHING LUNG: ICD-10-CM

## 2024-08-29 DIAGNOSIS — I10 ESSENTIAL HYPERTENSION, MALIGNANT: ICD-10-CM

## 2024-08-29 DIAGNOSIS — R53.83 TIREDNESS: ICD-10-CM

## 2024-08-29 DIAGNOSIS — D50.9 IRON DEFICIENCY ANEMIA, UNSPECIFIED IRON DEFICIENCY ANEMIA TYPE: ICD-10-CM

## 2024-08-29 LAB
25(OH)D3 SERPL-MCNC: 41.2 NG/ML (ref 30–100)
ALBUMIN SERPL-MCNC: 4.3 G/DL (ref 3.5–5.2)
ALBUMIN/GLOB SERPL: 1.2 G/DL
ALP SERPL-CCNC: 88 U/L (ref 39–117)
ALT SERPL W P-5'-P-CCNC: 15 U/L (ref 1–33)
ANION GAP SERPL CALCULATED.3IONS-SCNC: 13.6 MMOL/L (ref 5–15)
AST SERPL-CCNC: 21 U/L (ref 1–32)
BASOPHILS # BLD AUTO: 0.03 10*3/MM3 (ref 0–0.2)
BASOPHILS NFR BLD AUTO: 0.3 % (ref 0–1.5)
BILIRUB SERPL-MCNC: 0.5 MG/DL (ref 0–1.2)
BUN SERPL-MCNC: 11 MG/DL (ref 8–23)
BUN/CREAT SERPL: 11.3 (ref 7–25)
CALCIUM SPEC-SCNC: 10 MG/DL (ref 8.6–10.5)
CHLORIDE SERPL-SCNC: 107 MMOL/L (ref 98–107)
CHOLEST SERPL-MCNC: 178 MG/DL (ref 0–200)
CO2 SERPL-SCNC: 22.4 MMOL/L (ref 22–29)
CREAT SERPL-MCNC: 0.97 MG/DL (ref 0.57–1)
DEPRECATED RDW RBC AUTO: 40.8 FL (ref 37–54)
EGFRCR SERPLBLD CKD-EPI 2021: 61.4 ML/MIN/1.73
EOSINOPHIL # BLD AUTO: 0.81 10*3/MM3 (ref 0–0.4)
EOSINOPHIL NFR BLD AUTO: 8.6 % (ref 0.3–6.2)
ERYTHROCYTE [DISTWIDTH] IN BLOOD BY AUTOMATED COUNT: 12.5 % (ref 12.3–15.4)
GLOBULIN UR ELPH-MCNC: 3.6 GM/DL
GLUCOSE SERPL-MCNC: 94 MG/DL (ref 65–99)
HBA1C MFR BLD: 5.6 % (ref 4.8–5.6)
HCT VFR BLD AUTO: 44.7 % (ref 34–46.6)
HDLC SERPL-MCNC: 58 MG/DL (ref 40–60)
HGB BLD-MCNC: 14.9 G/DL (ref 12–15.9)
IMM GRANULOCYTES # BLD AUTO: 0.02 10*3/MM3 (ref 0–0.05)
IMM GRANULOCYTES NFR BLD AUTO: 0.2 % (ref 0–0.5)
IRON 24H UR-MRATE: 111 MCG/DL (ref 37–145)
IRON SATN MFR SERPL: 33 % (ref 20–50)
LDLC SERPL CALC-MCNC: 108 MG/DL (ref 0–100)
LDLC/HDLC SERPL: 1.85 {RATIO}
LYMPHOCYTES # BLD AUTO: 2.47 10*3/MM3 (ref 0.7–3.1)
LYMPHOCYTES NFR BLD AUTO: 26.4 % (ref 19.6–45.3)
MCH RBC QN AUTO: 30.2 PG (ref 26.6–33)
MCHC RBC AUTO-ENTMCNC: 33.3 G/DL (ref 31.5–35.7)
MCV RBC AUTO: 90.7 FL (ref 79–97)
MONOCYTES # BLD AUTO: 0.57 10*3/MM3 (ref 0.1–0.9)
MONOCYTES NFR BLD AUTO: 6.1 % (ref 5–12)
NEUTROPHILS NFR BLD AUTO: 5.47 10*3/MM3 (ref 1.7–7)
NEUTROPHILS NFR BLD AUTO: 58.4 % (ref 42.7–76)
NRBC BLD AUTO-RTO: 0 /100 WBC (ref 0–0.2)
PLATELET # BLD AUTO: 219 10*3/MM3 (ref 140–450)
PMV BLD AUTO: 11.2 FL (ref 6–12)
POTASSIUM SERPL-SCNC: 4.1 MMOL/L (ref 3.5–5.2)
PROT SERPL-MCNC: 7.9 G/DL (ref 6–8.5)
RBC # BLD AUTO: 4.93 10*6/MM3 (ref 3.77–5.28)
SODIUM SERPL-SCNC: 143 MMOL/L (ref 136–145)
TIBC SERPL-MCNC: 335 MCG/DL (ref 298–536)
TRANSFERRIN SERPL-MCNC: 225 MG/DL (ref 200–360)
TRIGL SERPL-MCNC: 63 MG/DL (ref 0–150)
TSH SERPL DL<=0.05 MIU/L-ACNC: 0.81 UIU/ML (ref 0.27–4.2)
VIT B12 BLD-MCNC: 719 PG/ML (ref 211–946)
VLDLC SERPL-MCNC: 12 MG/DL (ref 5–40)
WBC NRBC COR # BLD AUTO: 9.37 10*3/MM3 (ref 3.4–10.8)

## 2024-08-29 PROCEDURE — 82306 VITAMIN D 25 HYDROXY: CPT

## 2024-08-29 PROCEDURE — 82607 VITAMIN B-12: CPT

## 2024-08-29 PROCEDURE — 80053 COMPREHEN METABOLIC PANEL: CPT

## 2024-08-29 PROCEDURE — 83540 ASSAY OF IRON: CPT

## 2024-08-29 PROCEDURE — 84466 ASSAY OF TRANSFERRIN: CPT

## 2024-08-29 PROCEDURE — 83036 HEMOGLOBIN GLYCOSYLATED A1C: CPT

## 2024-08-29 PROCEDURE — 84443 ASSAY THYROID STIM HORMONE: CPT

## 2024-08-29 PROCEDURE — 85025 COMPLETE CBC W/AUTO DIFF WBC: CPT

## 2024-08-29 PROCEDURE — 80061 LIPID PANEL: CPT

## 2024-08-29 PROCEDURE — 36415 COLL VENOUS BLD VENIPUNCTURE: CPT

## 2024-10-11 ENCOUNTER — TRANSCRIBE ORDERS (OUTPATIENT)
Dept: ADMINISTRATIVE | Facility: HOSPITAL | Age: 74
End: 2024-10-11
Payer: MEDICARE

## 2024-10-11 DIAGNOSIS — Z12.31 ENCOUNTER FOR SCREENING MAMMOGRAM FOR MALIGNANT NEOPLASM OF BREAST: Primary | ICD-10-CM

## 2024-10-14 ENCOUNTER — TRANSCRIBE ORDERS (OUTPATIENT)
Dept: ADMINISTRATIVE | Facility: HOSPITAL | Age: 74
End: 2024-10-14
Payer: MEDICARE

## 2024-10-14 DIAGNOSIS — F17.210 NICOTINE DEPENDENCE, CIGARETTES, UNCOMPLICATED: Primary | ICD-10-CM

## (undated) DEVICE — JELLY,LUBE,STERILE,FLIP TOP,TUBE,2-OZ: Brand: MEDLINE

## (undated) DEVICE — Device

## (undated) DEVICE — LUBE JELLY PK/2.75GM STRL BX/144

## (undated) DEVICE — PAD GRND REM POLYHESIVE A/ DISP

## (undated) DEVICE — SNAR POLYP SENSATION STDOVL 27 240 BX40

## (undated) DEVICE — QUICK CATCH IN-LINE SUCTION POLYP TRAP IS USED FOR SUCTION RETRIEVAL OF ENDOSCOPICALLY REMOVED POLYPS.

## (undated) DEVICE — ENDOGATOR AUXILIARY WATER JET CONNECTOR: Brand: ENDOGATOR

## (undated) DEVICE — FRCP BIOP COLD ENDOJAW ALLGTR W/NDL 2.8X2300MM BLU

## (undated) DEVICE — KT ORCA VLV SXN AIR/H2O W/SEAL 1P/U STRL

## (undated) DEVICE — HYBRID TUBING/CAP SET FOR OLYMPUS® SCOPES: Brand: ERBE

## (undated) DEVICE — VLV SXN AIR/H2O ORCAPOD3 1P/U STRL

## (undated) DEVICE — ENDOSCOPY PORT CONNECTOR FOR OLYMPUS® SCOPES: Brand: ERBE

## (undated) DEVICE — NDL SCLEROTHERAPY INTERJECT 25G 4 240CM